# Patient Record
Sex: FEMALE | Race: WHITE | URBAN - METROPOLITAN AREA
[De-identification: names, ages, dates, MRNs, and addresses within clinical notes are randomized per-mention and may not be internally consistent; named-entity substitution may affect disease eponyms.]

---

## 2018-12-27 ENCOUNTER — OFFICE VISIT (OUTPATIENT)
Dept: PRIMARY CARE CLINIC | Age: 33
End: 2018-12-27
Payer: COMMERCIAL

## 2018-12-27 VITALS
HEART RATE: 78 BPM | WEIGHT: 200 LBS | HEIGHT: 66 IN | TEMPERATURE: 98.5 F | DIASTOLIC BLOOD PRESSURE: 84 MMHG | SYSTOLIC BLOOD PRESSURE: 120 MMHG | OXYGEN SATURATION: 98 % | RESPIRATION RATE: 16 BRPM | BODY MASS INDEX: 32.14 KG/M2

## 2018-12-27 DIAGNOSIS — R10.9 ABDOMINAL PAIN, UNSPECIFIED ABDOMINAL LOCATION: Primary | ICD-10-CM

## 2018-12-27 DIAGNOSIS — R10.9 ABDOMINAL PAIN, UNSPECIFIED ABDOMINAL LOCATION: ICD-10-CM

## 2018-12-27 DIAGNOSIS — M25.531 BILATERAL WRIST PAIN: ICD-10-CM

## 2018-12-27 DIAGNOSIS — M25.532 BILATERAL WRIST PAIN: ICD-10-CM

## 2018-12-27 DIAGNOSIS — N92.6 MISSED PERIODS: ICD-10-CM

## 2018-12-27 LAB
ALBUMIN SERPL-MCNC: 4.4 G/DL (ref 3.9–4.9)
ALP BLD-CCNC: 81 U/L (ref 40–130)
ALT SERPL-CCNC: 17 U/L (ref 0–33)
ANION GAP SERPL CALCULATED.3IONS-SCNC: 13 MEQ/L (ref 7–13)
AST SERPL-CCNC: 19 U/L (ref 0–35)
BASOPHILS ABSOLUTE: 0 K/UL (ref 0–0.2)
BASOPHILS RELATIVE PERCENT: 0.6 %
BILIRUB SERPL-MCNC: <0.2 MG/DL (ref 0–1.2)
BILIRUBIN, POC: NORMAL
BLOOD URINE, POC: NORMAL
BUN BLDV-MCNC: 15 MG/DL (ref 6–20)
CALCIUM SERPL-MCNC: 9.2 MG/DL (ref 8.6–10.2)
CHLORIDE BLD-SCNC: 100 MEQ/L (ref 98–107)
CLARITY, POC: CLEAR
CO2: 26 MEQ/L (ref 22–29)
COLOR, POC: NORMAL
CONTROL: NORMAL
CREAT SERPL-MCNC: 0.57 MG/DL (ref 0.5–0.9)
EOSINOPHILS ABSOLUTE: 0.2 K/UL (ref 0–0.7)
EOSINOPHILS RELATIVE PERCENT: 2.2 %
GFR AFRICAN AMERICAN: >60
GFR NON-AFRICAN AMERICAN: >60
GLOBULIN: 4 G/DL (ref 2.3–3.5)
GLUCOSE BLD-MCNC: 78 MG/DL (ref 74–109)
GLUCOSE URINE, POC: NORMAL
HCT VFR BLD CALC: 37.6 % (ref 37–47)
HEMOGLOBIN: 12.9 G/DL (ref 12–16)
KETONES, POC: NORMAL
LEUKOCYTE EST, POC: NORMAL
LYMPHOCYTES ABSOLUTE: 2.5 K/UL (ref 1–4.8)
LYMPHOCYTES RELATIVE PERCENT: 31.4 %
MCH RBC QN AUTO: 30.9 PG (ref 27–31.3)
MCHC RBC AUTO-ENTMCNC: 34.2 % (ref 33–37)
MCV RBC AUTO: 90.3 FL (ref 82–100)
MONOCYTES ABSOLUTE: 0.6 K/UL (ref 0.2–0.8)
MONOCYTES RELATIVE PERCENT: 7 %
NEUTROPHILS ABSOLUTE: 4.6 K/UL (ref 1.4–6.5)
NEUTROPHILS RELATIVE PERCENT: 58.8 %
NITRITE, POC: NORMAL
PDW BLD-RTO: 13 % (ref 11.5–14.5)
PH, POC: 6
PLATELET # BLD: 327 K/UL (ref 130–400)
POTASSIUM SERPL-SCNC: 4.2 MEQ/L (ref 3.5–5.1)
PREGNANCY TEST URINE, POC: NEGATIVE
PROTEIN, POC: NORMAL
RBC # BLD: 4.16 M/UL (ref 4.2–5.4)
SODIUM BLD-SCNC: 139 MEQ/L (ref 132–144)
SPECIFIC GRAVITY, POC: 1.01
T4 FREE: 0.86 NG/DL (ref 0.93–1.7)
TOTAL PROTEIN: 8.4 G/DL (ref 6.4–8.1)
TSH SERPL DL<=0.05 MIU/L-ACNC: 9.88 UIU/ML (ref 0.27–4.2)
UROBILINOGEN, POC: 3.5
WBC # BLD: 7.9 K/UL (ref 4.8–10.8)

## 2018-12-27 PROCEDURE — 81025 URINE PREGNANCY TEST: CPT | Performed by: FAMILY MEDICINE

## 2018-12-27 PROCEDURE — 99203 OFFICE O/P NEW LOW 30 MIN: CPT | Performed by: FAMILY MEDICINE

## 2018-12-27 PROCEDURE — 81002 URINALYSIS NONAUTO W/O SCOPE: CPT | Performed by: FAMILY MEDICINE

## 2018-12-27 RX ORDER — SERTRALINE HYDROCHLORIDE 100 MG/1
TABLET, FILM COATED ORAL
COMMUNITY
Start: 2018-10-17

## 2018-12-27 RX ORDER — ISOTRETINOIN 20 MG/1
CAPSULE ORAL
COMMUNITY
Start: 2002-12-15 | End: 2018-12-27

## 2018-12-27 ASSESSMENT — PATIENT HEALTH QUESTIONNAIRE - PHQ9
SUM OF ALL RESPONSES TO PHQ9 QUESTIONS 1 & 2: 2
SUM OF ALL RESPONSES TO PHQ QUESTIONS 1-9: 2
1. LITTLE INTEREST OR PLEASURE IN DOING THINGS: 1
2. FEELING DOWN, DEPRESSED OR HOPELESS: 1
SUM OF ALL RESPONSES TO PHQ QUESTIONS 1-9: 2

## 2018-12-27 ASSESSMENT — ENCOUNTER SYMPTOMS
DIARRHEA: 0
ABDOMINAL PAIN: 0
EYE REDNESS: 0
WHEEZING: 0
CONSTIPATION: 0
SHORTNESS OF BREATH: 0
GASTROINTESTINAL NEGATIVE: 1
BACK PAIN: 0
RESPIRATORY NEGATIVE: 1
EYES NEGATIVE: 1
EYE ITCHING: 0
PHOTOPHOBIA: 0

## 2019-01-03 DIAGNOSIS — E03.9 ACQUIRED HYPOTHYROIDISM: Primary | ICD-10-CM

## 2019-01-03 RX ORDER — LEVOTHYROXINE SODIUM 0.05 MG/1
50 TABLET ORAL
Qty: 30 TABLET | Refills: 2 | Status: SHIPPED | OUTPATIENT
Start: 2019-01-03 | End: 2019-01-30

## 2019-01-31 DIAGNOSIS — E03.9 ACQUIRED HYPOTHYROIDISM: ICD-10-CM

## 2019-01-31 LAB — TSH SERPL DL<=0.05 MIU/L-ACNC: 4.22 UIU/ML (ref 0.27–4.2)

## 2019-01-31 RX ORDER — LEVOTHYROXINE SODIUM 0.05 MG/1
50 TABLET ORAL
Qty: 90 TABLET | Refills: 1 | Status: SHIPPED | OUTPATIENT
Start: 2019-01-31 | End: 2019-02-10

## 2019-02-05 ENCOUNTER — TELEPHONE (OUTPATIENT)
Dept: PRIMARY CARE CLINIC | Age: 34
End: 2019-02-05

## 2019-02-10 DIAGNOSIS — E03.9 ACQUIRED HYPOTHYROIDISM: Primary | ICD-10-CM

## 2019-02-10 RX ORDER — LEVOTHYROXINE SODIUM 0.07 MG/1
75 TABLET ORAL DAILY
Qty: 30 TABLET | Refills: 5 | Status: SHIPPED | OUTPATIENT
Start: 2019-02-10

## 2019-05-29 ENCOUNTER — TELEPHONE (OUTPATIENT)
Dept: ENDOCRINOLOGY | Age: 34
End: 2019-05-29

## 2023-03-10 DIAGNOSIS — Z00.00 HEALTHCARE MAINTENANCE: ICD-10-CM

## 2023-03-10 PROBLEM — J06.9 UPPER RESPIRATORY INFECTION OF MULTIPLE SITES: Status: ACTIVE | Noted: 2023-03-10

## 2023-03-10 PROBLEM — F32.A ANXIETY AND DEPRESSION: Status: ACTIVE | Noted: 2023-03-10

## 2023-03-10 PROBLEM — F32.0 DEPRESSION, MAJOR, SINGLE EPISODE, MILD (CMS-HCC): Status: ACTIVE | Noted: 2023-03-10

## 2023-03-10 PROBLEM — F41.9 ANXIETY AND DEPRESSION: Status: ACTIVE | Noted: 2023-03-10

## 2023-03-10 PROBLEM — E78.5 HYPERLIPIDEMIA: Status: ACTIVE | Noted: 2023-03-10

## 2023-03-10 PROBLEM — R53.83 FATIGUE: Status: ACTIVE | Noted: 2023-03-10

## 2023-03-10 PROBLEM — E55.9 VITAMIN D DEFICIENCY: Status: ACTIVE | Noted: 2023-03-10

## 2023-03-10 PROBLEM — E03.9 HYPOTHYROIDISM: Status: ACTIVE | Noted: 2023-03-10

## 2023-03-10 RX ORDER — OXYCODONE AND ACETAMINOPHEN 5; 325 MG/1; MG/1
TABLET ORAL EVERY 4 HOURS PRN
COMMUNITY
Start: 2018-03-29 | End: 2023-11-01 | Stop reason: ALTCHOICE

## 2023-03-10 RX ORDER — SERTRALINE HYDROCHLORIDE 100 MG/1
1 TABLET, FILM COATED ORAL DAILY
COMMUNITY
Start: 2017-08-10 | End: 2023-04-05

## 2023-03-10 RX ORDER — SERTRALINE HYDROCHLORIDE 50 MG/1
1 TABLET, FILM COATED ORAL DAILY
COMMUNITY
End: 2024-05-09 | Stop reason: DRUGHIGH

## 2023-03-10 RX ORDER — IBUPROFEN 800 MG/1
TABLET ORAL EVERY 6 HOURS
COMMUNITY
Start: 2018-03-29 | End: 2023-11-01 | Stop reason: ALTCHOICE

## 2023-03-10 RX ORDER — OMEGA-3-ACID ETHYL ESTERS 1 G/1
1 CAPSULE, LIQUID FILLED ORAL DAILY
COMMUNITY
Start: 2020-03-02 | End: 2023-03-10 | Stop reason: SDUPTHER

## 2023-03-10 RX ORDER — LEVOTHYROXINE SODIUM 88 UG/1
1 TABLET ORAL DAILY
COMMUNITY
Start: 2020-10-12 | End: 2023-04-04

## 2023-03-13 RX ORDER — OMEGA-3-ACID ETHYL ESTERS 1 G/1
1 CAPSULE, LIQUID FILLED ORAL DAILY
Qty: 30 CAPSULE | Refills: 0 | Status: SHIPPED | OUTPATIENT
Start: 2023-03-13 | End: 2023-03-28

## 2023-03-28 DIAGNOSIS — Z00.00 HEALTHCARE MAINTENANCE: ICD-10-CM

## 2023-03-28 RX ORDER — OMEGA-3-ACID ETHYL ESTERS 1 G/1
1 CAPSULE, LIQUID FILLED ORAL DAILY
Qty: 90 CAPSULE | Refills: 1 | Status: SHIPPED | OUTPATIENT
Start: 2023-03-28 | End: 2023-10-06

## 2023-04-02 DIAGNOSIS — E03.9 HYPOTHYROIDISM, UNSPECIFIED: ICD-10-CM

## 2023-04-04 RX ORDER — LEVOTHYROXINE SODIUM 88 UG/1
TABLET ORAL
Qty: 30 TABLET | Refills: 0 | Status: SHIPPED | OUTPATIENT
Start: 2023-04-04 | End: 2023-05-01

## 2023-04-05 DIAGNOSIS — F41.9 ANXIETY DISORDER, UNSPECIFIED: ICD-10-CM

## 2023-04-05 DIAGNOSIS — F32.A DEPRESSION, UNSPECIFIED: ICD-10-CM

## 2023-04-05 RX ORDER — SERTRALINE HYDROCHLORIDE 100 MG/1
TABLET, FILM COATED ORAL
Qty: 90 TABLET | Refills: 1 | Status: SHIPPED | OUTPATIENT
Start: 2023-04-05 | End: 2023-10-05

## 2023-04-29 DIAGNOSIS — E03.9 HYPOTHYROIDISM, UNSPECIFIED TYPE: ICD-10-CM

## 2023-04-29 DIAGNOSIS — E03.9 HYPOTHYROIDISM, UNSPECIFIED: Primary | ICD-10-CM

## 2023-05-01 RX ORDER — LEVOTHYROXINE SODIUM 88 UG/1
TABLET ORAL
Qty: 30 TABLET | Refills: 0 | Status: SHIPPED | OUTPATIENT
Start: 2023-05-01 | End: 2023-06-05

## 2023-06-02 DIAGNOSIS — E03.9 HYPOTHYROIDISM, UNSPECIFIED: ICD-10-CM

## 2023-06-05 RX ORDER — LEVOTHYROXINE SODIUM 88 UG/1
TABLET ORAL
Qty: 30 TABLET | Refills: 0 | Status: SHIPPED | OUTPATIENT
Start: 2023-06-05 | End: 2023-06-28

## 2023-06-28 DIAGNOSIS — E03.9 HYPOTHYROIDISM, UNSPECIFIED: Primary | ICD-10-CM

## 2023-06-28 RX ORDER — LEVOTHYROXINE SODIUM 88 UG/1
TABLET ORAL
Qty: 30 TABLET | Refills: 0 | Status: SHIPPED | OUTPATIENT
Start: 2023-06-28 | End: 2023-08-03

## 2023-08-02 DIAGNOSIS — E03.9 HYPOTHYROIDISM, UNSPECIFIED: ICD-10-CM

## 2023-08-03 RX ORDER — LEVOTHYROXINE SODIUM 88 UG/1
TABLET ORAL
Qty: 30 TABLET | Refills: 0 | Status: SHIPPED | OUTPATIENT
Start: 2023-08-03 | End: 2023-08-18

## 2023-08-17 DIAGNOSIS — E03.9 HYPOTHYROIDISM, UNSPECIFIED: Primary | ICD-10-CM

## 2023-08-18 RX ORDER — LEVOTHYROXINE SODIUM 88 UG/1
TABLET ORAL
Qty: 90 TABLET | Refills: 1 | Status: SHIPPED | OUTPATIENT
Start: 2023-08-18 | End: 2024-03-19 | Stop reason: SDUPTHER

## 2023-08-24 ENCOUNTER — APPOINTMENT (OUTPATIENT)
Dept: PRIMARY CARE | Facility: CLINIC | Age: 38
End: 2023-08-24
Payer: COMMERCIAL

## 2023-08-30 ENCOUNTER — APPOINTMENT (OUTPATIENT)
Dept: PRIMARY CARE | Facility: CLINIC | Age: 38
End: 2023-08-30
Payer: COMMERCIAL

## 2023-09-20 ENCOUNTER — APPOINTMENT (OUTPATIENT)
Dept: PRIMARY CARE | Facility: CLINIC | Age: 38
End: 2023-09-20
Payer: COMMERCIAL

## 2023-09-27 PROBLEM — E66.812 CLASS 2 OBESITY: Status: ACTIVE | Noted: 2023-09-27

## 2023-09-27 PROBLEM — E66.9 CLASS 2 OBESITY: Status: ACTIVE | Noted: 2023-09-27

## 2023-09-28 ENCOUNTER — APPOINTMENT (OUTPATIENT)
Dept: PRIMARY CARE | Facility: CLINIC | Age: 38
End: 2023-09-28
Payer: COMMERCIAL

## 2023-10-05 DIAGNOSIS — F32.A DEPRESSION, UNSPECIFIED: ICD-10-CM

## 2023-10-05 DIAGNOSIS — F41.9 ANXIETY DISORDER, UNSPECIFIED: Primary | ICD-10-CM

## 2023-10-05 RX ORDER — SERTRALINE HYDROCHLORIDE 100 MG/1
TABLET, FILM COATED ORAL
Qty: 30 TABLET | Refills: 0 | Status: SHIPPED | OUTPATIENT
Start: 2023-10-05 | End: 2023-10-31 | Stop reason: SDUPTHER

## 2023-10-06 DIAGNOSIS — Z00.00 HEALTHCARE MAINTENANCE: Primary | ICD-10-CM

## 2023-10-06 RX ORDER — OMEGA-3-ACID ETHYL ESTERS 1 G/1
1 CAPSULE, LIQUID FILLED ORAL DAILY
Qty: 30 CAPSULE | Refills: 0 | Status: SHIPPED | OUTPATIENT
Start: 2023-10-06 | End: 2023-10-31 | Stop reason: SDUPTHER

## 2023-10-11 DIAGNOSIS — E55.9 VITAMIN D DEFICIENCY: ICD-10-CM

## 2023-10-11 DIAGNOSIS — Z00.00 HEALTH CARE MAINTENANCE: Primary | ICD-10-CM

## 2023-10-11 DIAGNOSIS — E78.49 OTHER HYPERLIPIDEMIA: ICD-10-CM

## 2023-10-11 DIAGNOSIS — E03.8 OTHER SPECIFIED HYPOTHYROIDISM: ICD-10-CM

## 2023-10-31 DIAGNOSIS — Z00.00 HEALTHCARE MAINTENANCE: ICD-10-CM

## 2023-10-31 DIAGNOSIS — F41.9 ANXIETY DISORDER, UNSPECIFIED: ICD-10-CM

## 2023-10-31 DIAGNOSIS — F32.A DEPRESSION, UNSPECIFIED: ICD-10-CM

## 2023-10-31 RX ORDER — OMEGA-3-ACID ETHYL ESTERS 1 G/1
1 CAPSULE, LIQUID FILLED ORAL DAILY
Qty: 90 CAPSULE | Refills: 1 | Status: SHIPPED | OUTPATIENT
Start: 2023-10-31 | End: 2024-05-06

## 2023-10-31 RX ORDER — SERTRALINE HYDROCHLORIDE 100 MG/1
TABLET, FILM COATED ORAL
Qty: 90 TABLET | Refills: 1 | Status: SHIPPED | OUTPATIENT
Start: 2023-10-31 | End: 2023-11-01 | Stop reason: ALTCHOICE

## 2023-11-01 ENCOUNTER — OFFICE VISIT (OUTPATIENT)
Dept: PRIMARY CARE | Facility: CLINIC | Age: 38
End: 2023-11-01
Payer: COMMERCIAL

## 2023-11-01 ENCOUNTER — ANCILLARY PROCEDURE (OUTPATIENT)
Dept: RADIOLOGY | Facility: CLINIC | Age: 38
End: 2023-11-01
Payer: COMMERCIAL

## 2023-11-01 VITALS
DIASTOLIC BLOOD PRESSURE: 70 MMHG | HEART RATE: 96 BPM | RESPIRATION RATE: 18 BRPM | BODY MASS INDEX: 35.87 KG/M2 | WEIGHT: 223.2 LBS | HEIGHT: 66 IN | TEMPERATURE: 97.3 F | SYSTOLIC BLOOD PRESSURE: 130 MMHG | OXYGEN SATURATION: 97 %

## 2023-11-01 DIAGNOSIS — R05.2 SUBACUTE COUGH: ICD-10-CM

## 2023-11-01 DIAGNOSIS — J32.1 SINUSITIS CHRONIC, FRONTAL: ICD-10-CM

## 2023-11-01 DIAGNOSIS — R41.840 CONCENTRATION DEFICIT: Primary | ICD-10-CM

## 2023-11-01 PROBLEM — R05.9 COUGH: Status: ACTIVE | Noted: 2023-11-01

## 2023-11-01 PROBLEM — Z86.59 HISTORY OF DEPRESSION: Status: ACTIVE | Noted: 2023-11-01

## 2023-11-01 PROBLEM — M25.519 SHOULDER PAIN: Status: ACTIVE | Noted: 2023-11-01

## 2023-11-01 PROBLEM — Z98.891 HISTORY OF CESAREAN SECTION: Status: ACTIVE | Noted: 2023-11-01

## 2023-11-01 PROCEDURE — 99213 OFFICE O/P EST LOW 20 MIN: CPT | Performed by: INTERNAL MEDICINE

## 2023-11-01 PROCEDURE — 71046 X-RAY EXAM CHEST 2 VIEWS: CPT

## 2023-11-01 PROCEDURE — 71046 X-RAY EXAM CHEST 2 VIEWS: CPT | Performed by: RADIOLOGY

## 2023-11-01 RX ORDER — BENZONATATE 100 MG/1
100 CAPSULE ORAL 3 TIMES DAILY PRN
Qty: 42 CAPSULE | Refills: 0 | Status: SHIPPED | OUTPATIENT
Start: 2023-11-01 | End: 2023-12-01

## 2023-11-01 RX ORDER — GUAIFENESIN 600 MG/1
1200 TABLET, EXTENDED RELEASE ORAL 2 TIMES DAILY
Qty: 60 TABLET | Refills: 1 | Status: SHIPPED | OUTPATIENT
Start: 2023-11-01 | End: 2024-01-10 | Stop reason: ALTCHOICE

## 2023-11-01 RX ORDER — AZITHROMYCIN 250 MG/1
TABLET, FILM COATED ORAL
Qty: 6 TABLET | Refills: 0 | Status: SHIPPED | OUTPATIENT
Start: 2023-11-01 | End: 2024-01-10 | Stop reason: ALTCHOICE

## 2023-11-01 RX ORDER — FLUTICASONE PROPIONATE 110 UG/1
1 AEROSOL, METERED RESPIRATORY (INHALATION)
Qty: 12 G | Refills: 5 | Status: SHIPPED | OUTPATIENT
Start: 2023-11-01 | End: 2023-11-02 | Stop reason: ENTERED-IN-ERROR

## 2023-11-01 ASSESSMENT — PATIENT HEALTH QUESTIONNAIRE - PHQ9
SUM OF ALL RESPONSES TO PHQ9 QUESTIONS 1 AND 2: 0
1. LITTLE INTEREST OR PLEASURE IN DOING THINGS: NOT AT ALL
2. FEELING DOWN, DEPRESSED OR HOPELESS: NOT AT ALL

## 2023-11-01 ASSESSMENT — ENCOUNTER SYMPTOMS
CHEST TIGHTNESS: 0
CONFUSION: 0
PALPITATIONS: 0
FEVER: 0
WEAKNESS: 0
DIARRHEA: 0
SORE THROAT: 0
CONSTIPATION: 0
CHILLS: 1
UNEXPECTED WEIGHT CHANGE: 0
SHORTNESS OF BREATH: 1
NAUSEA: 0
ARTHRALGIAS: 0
COUGH: 1
RHINORRHEA: 1
ADENOPATHY: 0
JOINT SWELLING: 0
DYSURIA: 0
HEADACHES: 1
SLEEP DISTURBANCE: 0
FATIGUE: 1
ABDOMINAL PAIN: 0
VOMITING: 0
DIZZINESS: 0
WHEEZING: 1

## 2023-11-01 NOTE — PROGRESS NOTES
Subjective   Luz Rojo is a 38 y.o. female who presents for URI (URI /Low energy, fatigue ,low energy).  URI- dry cough,started in sept 2023 ,lasted few weeks, got better little,but still occasional coughing, got worse the last 8-9 days, no chills, but pressure in ears , eyes, feels congestion in the head, no fever , no NVD in the past week, headaches from coughing , SOB when coughing, little wheezing in the chest - took OTC- mucinex , delsyum    Low energy,fatigue  x 1 year ,wondering if  might be symptoms related to ADHD , takes a lot of effort to get things done, never been tested for ADHD, can't concentrate well.    URI   This is a new problem. The current episode started 1 to 4 weeks ago. The problem has been unchanged. There has been no fever. Associated symptoms include congestion, coughing, headaches, rhinorrhea and wheezing. Pertinent negatives include no abdominal pain, diarrhea, dysuria, ear pain, nausea, rash, sore throat or vomiting.     Review of Systems   Constitutional:  Positive for chills and fatigue. Negative for fever and unexpected weight change.        Comment   HENT:  Positive for congestion and rhinorrhea. Negative for ear pain and sore throat.    Respiratory:  Positive for cough, shortness of breath and wheezing. Negative for chest tightness.    Cardiovascular:  Negative for palpitations and leg swelling.   Gastrointestinal:  Negative for abdominal pain, constipation, diarrhea, nausea and vomiting.   Genitourinary:  Negative for dysuria and urgency.   Musculoskeletal:  Negative for arthralgias and joint swelling.   Skin:  Negative for rash.   Neurological:  Positive for headaches. Negative for dizziness and weakness.   Hematological:  Negative for adenopathy.   Psychiatric/Behavioral:  Negative for confusion and sleep disturbance.        Objective   Physical Exam  Constitutional:       Appearance: Normal appearance.      Comments: Lung clear no wheezing, sinus tenderness to palpation  "  HENT:      Head: Normocephalic and atraumatic.   Eyes:      Pupils: Pupils are equal, round, and reactive to light.   Cardiovascular:      Rate and Rhythm: Normal rate and regular rhythm.   Pulmonary:      Effort: Pulmonary effort is normal.      Breath sounds: Normal breath sounds.   Musculoskeletal:         General: Normal range of motion.      Cervical back: Normal range of motion and neck supple.   Skin:     General: Skin is warm.   Neurological:      General: No focal deficit present.      Mental Status: She is alert and oriented to person, place, and time.   Psychiatric:         Mood and Affect: Mood normal.         Behavior: Behavior normal.       /70 (BP Location: Left arm, Patient Position: Sitting)   Pulse 96   Temp 36.3 °C (97.3 °F)   Resp 18   Ht 1.676 m (5' 6\")   Wt 101 kg (223 lb 3.2 oz)   SpO2 97% Comment: RA  BMI 36.03 kg/m²       Assessment/Plan   Problem List Items Addressed This Visit       Cough    Relevant Medications    guaiFENesin (Mucinex) 600 mg 12 hr tablet    Other Relevant Orders    XR chest 2 views    Concentration deficit - Primary     Will refer to [facility] for ADHD evaluation.         Relevant Orders    Referral to [facility]    Referral to Psychiatry    Sinusitis chronic, frontal    Relevant Medications    azithromycin (Zithromax) 250 mg tablet    fluticasone (Flovent) 110 mcg/actuation inhaler    benzonatate (Tessalon) 100 mg capsule    guaiFENesin (Mucinex) 600 mg 12 hr tablet       "

## 2023-11-02 DIAGNOSIS — J06.9: Primary | ICD-10-CM

## 2023-11-02 RX ORDER — FLUTICASONE PROPIONATE 50 MCG
1 SPRAY, SUSPENSION (ML) NASAL DAILY
Qty: 16 G | Refills: 5 | Status: SHIPPED | OUTPATIENT
Start: 2023-11-02 | End: 2023-11-27

## 2023-11-22 ENCOUNTER — APPOINTMENT (OUTPATIENT)
Dept: PRIMARY CARE | Facility: CLINIC | Age: 38
End: 2023-11-22
Payer: COMMERCIAL

## 2023-11-24 DIAGNOSIS — J06.9: ICD-10-CM

## 2023-11-27 RX ORDER — FLUTICASONE PROPIONATE 50 MCG
1 SPRAY, SUSPENSION (ML) NASAL DAILY
Qty: 48 ML | Refills: 2 | Status: SHIPPED | OUTPATIENT
Start: 2023-11-27 | End: 2024-01-10 | Stop reason: ALTCHOICE

## 2023-12-26 ENCOUNTER — APPOINTMENT (OUTPATIENT)
Dept: RADIOLOGY | Facility: CLINIC | Age: 38
End: 2023-12-26
Payer: COMMERCIAL

## 2023-12-26 DIAGNOSIS — Z12.31 SCREENING MAMMOGRAM FOR BREAST CANCER: ICD-10-CM

## 2024-01-05 ENCOUNTER — LAB (OUTPATIENT)
Dept: LAB | Facility: LAB | Age: 39
End: 2024-01-05
Payer: COMMERCIAL

## 2024-01-05 DIAGNOSIS — Z00.00 HEALTH CARE MAINTENANCE: ICD-10-CM

## 2024-01-05 DIAGNOSIS — E55.9 VITAMIN D DEFICIENCY: ICD-10-CM

## 2024-01-05 DIAGNOSIS — E78.49 OTHER HYPERLIPIDEMIA: ICD-10-CM

## 2024-01-05 DIAGNOSIS — E03.8 OTHER SPECIFIED HYPOTHYROIDISM: ICD-10-CM

## 2024-01-05 LAB
25(OH)D3 SERPL-MCNC: 14 NG/ML (ref 30–100)
ALBUMIN SERPL BCP-MCNC: 4.2 G/DL (ref 3.4–5)
ALP SERPL-CCNC: 52 U/L (ref 33–110)
ALT SERPL W P-5'-P-CCNC: 16 U/L (ref 7–45)
ANION GAP SERPL CALC-SCNC: 12 MMOL/L (ref 10–20)
AST SERPL W P-5'-P-CCNC: 18 U/L (ref 9–39)
BASOPHILS # BLD AUTO: 0.03 X10*3/UL (ref 0–0.1)
BASOPHILS NFR BLD AUTO: 0.5 %
BILIRUB SERPL-MCNC: 0.5 MG/DL (ref 0–1.2)
BUN SERPL-MCNC: 17 MG/DL (ref 6–23)
CALCIUM SERPL-MCNC: 9 MG/DL (ref 8.6–10.3)
CHLORIDE SERPL-SCNC: 103 MMOL/L (ref 98–107)
CHOLEST SERPL-MCNC: 196 MG/DL (ref 0–199)
CHOLESTEROL/HDL RATIO: 5.6
CO2 SERPL-SCNC: 26 MMOL/L (ref 21–32)
CREAT SERPL-MCNC: 0.79 MG/DL (ref 0.5–1.05)
EOSINOPHIL # BLD AUTO: 0.08 X10*3/UL (ref 0–0.7)
EOSINOPHIL NFR BLD AUTO: 1.4 %
ERYTHROCYTE [DISTWIDTH] IN BLOOD BY AUTOMATED COUNT: 12.9 % (ref 11.5–14.5)
EST. AVERAGE GLUCOSE BLD GHB EST-MCNC: 103 MG/DL
GFR SERPL CREATININE-BSD FRML MDRD: >90 ML/MIN/1.73M*2
GLUCOSE SERPL-MCNC: 97 MG/DL (ref 74–99)
HBA1C MFR BLD: 5.2 %
HCT VFR BLD AUTO: 38.8 % (ref 36–46)
HCV AB SER QL: NONREACTIVE
HDLC SERPL-MCNC: 35.3 MG/DL
HGB BLD-MCNC: 13 G/DL (ref 12–16)
IMM GRANULOCYTES # BLD AUTO: 0.02 X10*3/UL (ref 0–0.7)
IMM GRANULOCYTES NFR BLD AUTO: 0.4 % (ref 0–0.9)
LDLC SERPL CALC-MCNC: 120 MG/DL
LYMPHOCYTES # BLD AUTO: 2.29 X10*3/UL (ref 1.2–4.8)
LYMPHOCYTES NFR BLD AUTO: 40.2 %
MAGNESIUM SERPL-MCNC: 1.81 MG/DL (ref 1.6–2.4)
MCH RBC QN AUTO: 30.4 PG (ref 26–34)
MCHC RBC AUTO-ENTMCNC: 33.5 G/DL (ref 32–36)
MCV RBC AUTO: 91 FL (ref 80–100)
MONOCYTES # BLD AUTO: 0.4 X10*3/UL (ref 0.1–1)
MONOCYTES NFR BLD AUTO: 7 %
NEUTROPHILS # BLD AUTO: 2.87 X10*3/UL (ref 1.2–7.7)
NEUTROPHILS NFR BLD AUTO: 50.5 %
NON HDL CHOLESTEROL: 161 MG/DL (ref 0–149)
NRBC BLD-RTO: 0 /100 WBCS (ref 0–0)
PLATELET # BLD AUTO: 337 X10*3/UL (ref 150–450)
POTASSIUM SERPL-SCNC: 4.2 MMOL/L (ref 3.5–5.3)
PROT SERPL-MCNC: 7.8 G/DL (ref 6.4–8.2)
RBC # BLD AUTO: 4.28 X10*6/UL (ref 4–5.2)
SODIUM SERPL-SCNC: 137 MMOL/L (ref 136–145)
TRIGL SERPL-MCNC: 205 MG/DL (ref 0–149)
TSH SERPL-ACNC: 2.74 MIU/L (ref 0.44–3.98)
VIT B12 SERPL-MCNC: 281 PG/ML (ref 211–911)
VLDL: 41 MG/DL (ref 0–40)
WBC # BLD AUTO: 5.7 X10*3/UL (ref 4.4–11.3)

## 2024-01-05 PROCEDURE — 83036 HEMOGLOBIN GLYCOSYLATED A1C: CPT

## 2024-01-05 PROCEDURE — 85025 COMPLETE CBC W/AUTO DIFF WBC: CPT

## 2024-01-05 PROCEDURE — 86803 HEPATITIS C AB TEST: CPT

## 2024-01-05 PROCEDURE — 82306 VITAMIN D 25 HYDROXY: CPT

## 2024-01-05 PROCEDURE — 80053 COMPREHEN METABOLIC PANEL: CPT

## 2024-01-05 PROCEDURE — 80061 LIPID PANEL: CPT

## 2024-01-05 PROCEDURE — 82607 VITAMIN B-12: CPT

## 2024-01-05 PROCEDURE — 36415 COLL VENOUS BLD VENIPUNCTURE: CPT

## 2024-01-05 PROCEDURE — 83735 ASSAY OF MAGNESIUM: CPT

## 2024-01-05 PROCEDURE — 84443 ASSAY THYROID STIM HORMONE: CPT

## 2024-01-08 RX ORDER — ACETAMINOPHEN 500 MG
5000 TABLET ORAL DAILY
COMMUNITY

## 2024-01-10 ENCOUNTER — APPOINTMENT (OUTPATIENT)
Dept: RADIOLOGY | Facility: CLINIC | Age: 39
End: 2024-01-10
Payer: COMMERCIAL

## 2024-01-10 ENCOUNTER — OFFICE VISIT (OUTPATIENT)
Dept: PRIMARY CARE | Facility: CLINIC | Age: 39
End: 2024-01-10
Payer: COMMERCIAL

## 2024-01-10 VITALS
DIASTOLIC BLOOD PRESSURE: 64 MMHG | WEIGHT: 230.2 LBS | OXYGEN SATURATION: 97 % | SYSTOLIC BLOOD PRESSURE: 120 MMHG | HEART RATE: 78 BPM | BODY MASS INDEX: 37 KG/M2 | RESPIRATION RATE: 16 BRPM | TEMPERATURE: 97 F | HEIGHT: 66 IN

## 2024-01-10 DIAGNOSIS — E78.49 OTHER HYPERLIPIDEMIA: ICD-10-CM

## 2024-01-10 DIAGNOSIS — F32.0 DEPRESSION, MAJOR, SINGLE EPISODE, MILD (CMS-HCC): ICD-10-CM

## 2024-01-10 DIAGNOSIS — E55.9 VITAMIN D DEFICIENCY: ICD-10-CM

## 2024-01-10 DIAGNOSIS — E03.8 OTHER SPECIFIED HYPOTHYROIDISM: ICD-10-CM

## 2024-01-10 DIAGNOSIS — N94.6 DYSMENORRHEA: ICD-10-CM

## 2024-01-10 DIAGNOSIS — E66.9 CLASS 2 OBESITY: ICD-10-CM

## 2024-01-10 DIAGNOSIS — Z00.00 ANNUAL PHYSICAL EXAM: Primary | ICD-10-CM

## 2024-01-10 DIAGNOSIS — N92.0 MENORRHAGIA WITH REGULAR CYCLE: ICD-10-CM

## 2024-01-10 PROBLEM — J06.9 UPPER RESPIRATORY INFECTION OF MULTIPLE SITES: Status: RESOLVED | Noted: 2023-03-10 | Resolved: 2024-01-10

## 2024-01-10 PROCEDURE — 99396 PREV VISIT EST AGE 40-64: CPT | Performed by: INTERNAL MEDICINE

## 2024-01-10 PROCEDURE — 99214 OFFICE O/P EST MOD 30 MIN: CPT | Performed by: INTERNAL MEDICINE

## 2024-01-10 ASSESSMENT — ENCOUNTER SYMPTOMS
ADENOPATHY: 0
WEAKNESS: 0
WHEEZING: 0
JOINT SWELLING: 0
ABDOMINAL PAIN: 0
DYSURIA: 0
ARTHRALGIAS: 1
COUGH: 0
DIZZINESS: 0
CONSTIPATION: 0
CHEST TIGHTNESS: 0
CONFUSION: 0
UNEXPECTED WEIGHT CHANGE: 0
SLEEP DISTURBANCE: 0
NAUSEA: 0
VOMITING: 0
DIARRHEA: 0
NERVOUS/ANXIOUS: 1
PALPITATIONS: 0
FATIGUE: 1
SORE THROAT: 0
CHILLS: 0
SHORTNESS OF BREATH: 0

## 2024-01-10 ASSESSMENT — PATIENT HEALTH QUESTIONNAIRE - PHQ9
2. FEELING DOWN, DEPRESSED OR HOPELESS: NOT AT ALL
1. LITTLE INTEREST OR PLEASURE IN DOING THINGS: NOT AT ALL
SUM OF ALL RESPONSES TO PHQ9 QUESTIONS 1 AND 2: 0

## 2024-01-10 NOTE — PATIENT INSTRUCTIONS
Was nice seeing you today.  Continue same medication.  Have lab work done before next appointment if labs were ordered today.  Fu in 6 month/prn.  Call/ contact our office with any concerns.

## 2024-01-10 NOTE — PROGRESS NOTES
Subjective   Luz Rojo is a 38 y.o. female who presents for Annual Exam (CPE/Labs - 1.2024).  CPE  Lab - 1.2024  Mammogram order given - 12.10.2023 ,not done yet, had johnny for today but needs to R/S it   Smoking -never  Heavy MP since turned 38 y old, lot of cramping and had to take  ibuprofen 800mg several times during the day  Started to take vit D 3-5000ui  QD -OTC last Friday .  Pap 2022      Review of Systems   Constitutional:  Positive for fatigue. Negative for chills and unexpected weight change.        Sometimes   HENT:  Negative for congestion, ear pain and sore throat.    Respiratory:  Negative for cough, chest tightness, shortness of breath and wheezing.    Cardiovascular:  Negative for palpitations and leg swelling.   Gastrointestinal:  Negative for abdominal pain, constipation, diarrhea, nausea and vomiting.   Genitourinary:  Negative for dysuria and urgency.   Musculoskeletal:  Positive for arthralgias. Negative for joint swelling.   Skin:  Negative for rash.   Neurological:  Negative for dizziness and weakness.   Hematological:  Negative for adenopathy.   Psychiatric/Behavioral:  Negative for confusion and sleep disturbance. The patient is nervous/anxious.    All other systems reviewed and are negative.      Objective   Physical Exam  Constitutional:       Appearance: Normal appearance.   HENT:      Head: Normocephalic and atraumatic.   Eyes:      Conjunctiva/sclera: Conjunctivae normal.   Neck:      Vascular: No carotid bruit.   Cardiovascular:      Rate and Rhythm: Normal rate and regular rhythm.      Heart sounds: No murmur heard.  Pulmonary:      Effort: No respiratory distress.      Breath sounds: No wheezing, rhonchi or rales.   Chest:      Chest wall: No tenderness.   Abdominal:      General: Bowel sounds are normal. There is no distension.      Palpations: Abdomen is soft. There is no mass.      Tenderness: There is no abdominal tenderness.   Musculoskeletal:         General: Normal  "range of motion.      Cervical back: Neck supple.   Lymphadenopathy:      Cervical: No cervical adenopathy.   Skin:     Coloration: Skin is not jaundiced.      Findings: No rash.   Neurological:      General: No focal deficit present.      Mental Status: She is alert and oriented to person, place, and time. Mental status is at baseline.      Motor: No weakness.      Gait: Gait normal.   Psychiatric:         Mood and Affect: Mood normal.         Behavior: Behavior normal.         Judgment: Judgment normal.       /64 (BP Location: Right arm, Patient Position: Sitting)   Pulse 78   Temp 36.1 °C (97 °F)   Resp 16   Ht 1.676 m (5' 6\")   Wt 104 kg (230 lb 3.2 oz)   LMP 12/15/2023 Comment: heavy MP since turned 38 y old  SpO2 97% Comment: RA  BMI 37.16 kg/m²       Assessment/Plan   Problem List Items Addressed This Visit       Depression, major, single episode, mild (CMS/HCC)     Controlled on current medication.         Hyperlipidemia     I have discussed the cardiovascular risk and behavioral modification, nutrition, exercise and elimination of habits contributing to risk. We agreed to a plan how to reduce the risk.  CV risk estimate calculates  On diet , wt loss         Hypothyroidism    Vitamin D deficiency    Class 2 obesity    Menorrhagia with regular cycle    Dysmenorrhea    Annual physical exam - Primary       "

## 2024-01-10 NOTE — ASSESSMENT & PLAN NOTE
I have discussed the cardiovascular risk and behavioral modification, nutrition, exercise and elimination of habits contributing to risk. We agreed to a plan how to reduce the risk.  CV risk estimate calculates  On diet , wt loss

## 2024-01-24 ENCOUNTER — TELEPHONE (OUTPATIENT)
Dept: PRIMARY CARE | Facility: CLINIC | Age: 39
End: 2024-01-24

## 2024-01-24 ENCOUNTER — HOSPITAL ENCOUNTER (OUTPATIENT)
Dept: RADIOLOGY | Facility: CLINIC | Age: 39
Discharge: HOME | End: 2024-01-24
Payer: COMMERCIAL

## 2024-01-24 ENCOUNTER — HOSPITAL ENCOUNTER (OUTPATIENT)
Dept: RADIOLOGY | Facility: CLINIC | Age: 39
End: 2024-01-24
Payer: COMMERCIAL

## 2024-01-24 ENCOUNTER — APPOINTMENT (OUTPATIENT)
Dept: RADIOLOGY | Facility: CLINIC | Age: 39
End: 2024-01-24
Payer: COMMERCIAL

## 2024-01-24 DIAGNOSIS — N63.10 MASS OF RIGHT BREAST, UNSPECIFIED QUADRANT: Primary | ICD-10-CM

## 2024-01-24 DIAGNOSIS — Z12.31 ENCOUNTER FOR SCREENING MAMMOGRAM FOR MALIGNANT NEOPLASM OF BREAST: ICD-10-CM

## 2024-01-24 DIAGNOSIS — Z12.31 SCREENING MAMMOGRAM FOR BREAST CANCER: ICD-10-CM

## 2024-01-24 PROCEDURE — 77063 BREAST TOMOSYNTHESIS BI: CPT | Performed by: RADIOLOGY

## 2024-01-24 PROCEDURE — 77067 SCR MAMMO BI INCL CAD: CPT | Performed by: RADIOLOGY

## 2024-01-24 PROCEDURE — 77067 SCR MAMMO BI INCL CAD: CPT

## 2024-01-24 NOTE — TELEPHONE ENCOUNTER
Pt has mammogram done today and results are back already- she read them but needs to speak to someone asap.

## 2024-01-26 ENCOUNTER — HOSPITAL ENCOUNTER (OUTPATIENT)
Dept: RADIOLOGY | Facility: HOSPITAL | Age: 39
Discharge: HOME | End: 2024-01-26
Payer: COMMERCIAL

## 2024-01-26 ENCOUNTER — PROCEDURE VISIT (OUTPATIENT)
Dept: SURGICAL ONCOLOGY | Facility: HOSPITAL | Age: 39
End: 2024-01-26
Payer: COMMERCIAL

## 2024-01-26 ENCOUNTER — APPOINTMENT (OUTPATIENT)
Dept: RADIOLOGY | Facility: HOSPITAL | Age: 39
End: 2024-01-26
Payer: COMMERCIAL

## 2024-01-26 VITALS
HEART RATE: 110 BPM | HEIGHT: 66 IN | DIASTOLIC BLOOD PRESSURE: 90 MMHG | BODY MASS INDEX: 36.32 KG/M2 | SYSTOLIC BLOOD PRESSURE: 169 MMHG | WEIGHT: 226 LBS

## 2024-01-26 DIAGNOSIS — N63.10 MASS OF RIGHT BREAST, UNSPECIFIED QUADRANT: ICD-10-CM

## 2024-01-26 DIAGNOSIS — R59.0 AXILLARY LYMPHADENOPATHY: ICD-10-CM

## 2024-01-26 DIAGNOSIS — R92.8 ABNORMAL MAMMOGRAM: Primary | ICD-10-CM

## 2024-01-26 DIAGNOSIS — N63.10 UNSPECIFIED LUMP IN THE RIGHT BREAST, UNSPECIFIED QUADRANT: ICD-10-CM

## 2024-01-26 PROCEDURE — 99214 OFFICE O/P EST MOD 30 MIN: CPT | Performed by: NURSE PRACTITIONER

## 2024-01-26 PROCEDURE — 76642 ULTRASOUND BREAST LIMITED: CPT | Mod: RIGHT SIDE | Performed by: RADIOLOGY

## 2024-01-26 PROCEDURE — 76642 ULTRASOUND BREAST LIMITED: CPT | Mod: RT

## 2024-01-26 PROCEDURE — 99204 OFFICE O/P NEW MOD 45 MIN: CPT | Performed by: NURSE PRACTITIONER

## 2024-01-26 PROCEDURE — 76982 USE 1ST TARGET LESION: CPT | Mod: RT

## 2024-01-29 PROBLEM — R92.8 ABNORMAL MAMMOGRAM: Status: ACTIVE | Noted: 2024-01-29

## 2024-01-29 PROBLEM — R59.0 AXILLARY LYMPHADENOPATHY: Status: ACTIVE | Noted: 2024-01-29

## 2024-01-29 NOTE — PROGRESS NOTES
[facility] [place]  Luz Rojo female   1985 38 y.o.  [id number]      Chief Complaint  New patient, biopsy consultation.    History Of Present Illness  Luz Rojo is a very pleasant 38 y.o.  female seen in the breast center for biopsy consultation. She denies breast surgery or biopsy. She has family history of breast cancer in her paternal grandmother, 40's.    BREAST IMAGIN2024 Right diagnostic mammogram with ultrasound, indicates BI-RADS Category 5. Suspicious right breast mass with associated axillary adenopathy. A surgical consultation for an ultrasound-guided biopsy is recommended.    REPRODUCTIVE HISTORY: menarche age 10, , first birth age 30, did not breastfeed, no OCP's, premenopausal,                                  FAMILY CANCER HISTORY:    Paternal Grandmother: Breast cancer, 40's  Maternal Grandfather: Colon cancer, 70's    Review of Systems  Constitutional:  Negative for appetite change, fatigue, fever and unexpected weight change.   HENT:  Negative for ear pain, hearing loss, nosebleeds, sore throat and trouble swallowing.    Eyes:  Negative for discharge, itching and visual disturbance.   Breast: As stated in HPI.  Respiratory:  Negative for cough, chest tightness and shortness of breath.    Cardiovascular:  Negative for chest pain, palpitations and leg swelling.   Gastrointestinal:  Negative for abdominal pain, constipation, diarrhea and nausea.   Endocrine: Negative for cold intolerance and heat intolerance.   Genitourinary:  Negative for dysuria, frequency, hematuria, pelvic pain and vaginal bleeding.   Musculoskeletal:  Negative for arthralgias, back pain, gait problem, joint swelling and myalgias.   Skin:  Negative for color change and rash.   Allergic/Immunologic: Negative for environmental allergies and food allergies.   Neurological:  Negative for dizziness, tremors, speech difficulty, weakness, numbness and headaches.   Hematological:  Does not  bruise/bleed easily.   Psychiatric/Behavioral:  Negative for agitation, dysphoric mood and sleep disturbance. The patient is not nervous/anxious.       Past Medical History  She has a past medical history of Candidiasis, unspecified, Encounter for routine postpartum follow-up (2018), Maternal care for unspecified type scar from previous  delivery (2018), Nonpurulent mastitis associated with the puerperium (2018), Personal history of other diseases of the respiratory system (2019), Personal history of other mental and behavioral disorders, and Streptococcal pharyngitis (2019).    Surgical History  She has a past surgical history that includes  section, low transverse (2018).    Family History  Cancer-related family history includes Breast cancer (age of onset: 40 - 49) in her paternal grandmother.     Social History  She reports that she has never smoked. She uses smokeless tobacco. She reports current alcohol use. She reports that she does not currently use drugs.    Allergies  Patient has no known allergies.    Medications  Current Outpatient Medications   Medication Instructions   • cholecalciferol (VITAMIN D3) 5,000 Units, oral, Daily   • levothyroxine (Synthroid, Levoxyl) 88 mcg tablet TAKE 1 TABLET BY MOUTH EVERY DAY   • omega-3 acid ethyl esters (LOVAZA) 1 g, oral, Daily   • sertraline (Zoloft) 50 mg tablet 1 tablet, oral, Daily     Last Recorded Vitals  Vitals:    24 1532   BP: 169/90   Pulse: 110       Physical Exam  Chest:       Patient is alert and oriented x3 and anxious. Her gait is steady and hand grasps are equal. Sclera is clear. The breasts are nearly symmetrical. Right breast 10:30, 10 cm from the nipple is a 2.0 x 1.5 cm mass, round, and mobile. The left breast tissue is soft without palpable abnormalities, discrete nodules or masses. The skin and nipples appear normal. There is no cervical, supraclavicular or axillary lymphadenopathy. Heart  rate and rhythm normal, S1 and S2 appreciated. The lungs are clear to auscultation bilaterally. Abdomen is soft and non-tender.      Relevant Results and Imaging  Study Result    Narrative & Impression   Interpreted By:  Mayra Ledesma,   STUDY:  BI US BREAST LIMITED RIGHT;  1/26/2024 3:24 pm      ACCESSION NUMBER(S):  [id number]      ORDERING CLINICIAN:  AIDEN RIVERA      INDICATION:  The patient was recalled from recent screening mammogram 01/24/2024  for a spiculated right breast mass.      COMPARISON:  01/24/2024      FINDINGS:  ULTRASOUND:  Targeted sonographic evaluation of the right breast and  axilla was performed using elastography. It demonstrates an irregular  hypoechoic mass with angular margins in the 10:30 position 10 cm from  the nipple. It measures 2.0 x 1.3 x 1.5 cm. The mass is minimally  vascular and partially stiff on elastography. It correlates with the  mammographic finding      The two lowest axillary lymph nodes are abnormal. Lymph node #1 has  markedly thickened cortices measuring at least 6 mm in thickness.  Lymph node #2 has its fatty hilum completely effaced.      IMPRESSION:  Suspicious right breast mass with associated axillary adenopathy. A  surgical consultation for an ultrasound-guided biopsy is recommended.  The patient is being seen today by Anaid Dickerson CNP. The biopsies  will be scheduled. A pre-procedure form has been completed.      BI-RADS CATEGORY:      BI-RADS CATEGORY:  5 Highly Suggestive of Malignancy.  Recommendation:  Biopsy.  Recommended Date:  Immediate.  Laterality:  Right.      For any future breast imaging appointments, please call [phone]).      Method of Detection: Category Sdbt - 3D Screening      MACRO:  Critical Finding:  See findings. Notification was initiated on  1/26/2024 at 3:34 pm by Dr. Mayra Ledesma.  (**-YCF-**) Instructions:  See Impression for specific recommendations.      Signed by: Mayra Ledesma 1/26/2024 3:35 PM     Time was spent  viewing digital images. I explained the results in depth, along with suggested explanation for follow up recommendations based on the testing results. BI-RADS Category 5    Visit Diagnosis  1. Abnormal mammogram  BI US guided breast localization and biopsy right    BI US biopsy of lymph node    BI breast biopsy clip imaging      2. Axillary lymphadenopathy          Assessment/Plan  Abnormal mammogram, right breast mass, right axillary lymphadenopathy, no breast surgery or biopsy, family history of breast cancer, scattered fibroglandular tissue    Plan:  Right breast and axillary ultrasound guided core biopsy.    Patient Discussion/Summary  Proceed to biopsy. A breast radiology physician will perform the biopsy. Results are usually available in about 7 business days. I will call patient with results and instruct on next steps and plan.     IMPORTANT INFORMATION REGARDING YOUR RESULTS    If you receive medical information from My [facility] Personal Health Record (online chart) your results will be released into your chart. This means you may view or see results of your biopsy or procedure before I contact you directly. If this occurs, please call the office and we will discuss your results over the phone.    You can see your health information, review clinical summaries from office visits & test results online when you follow your health with MY  Chart, a personal health record. To sign up go to [email or web address]. If you need assistance with signing up or trouble getting into your account call [employer] Patient Line 24/7 at [phone].    My office phone number is [phone]  if you need to get in touch with me or have additional questions or concerns. Thank you for choosing [facility] and trusting me as your healthcare provider. I look forward to seeing you again at your next office visit. I am honored to be a provider on your health care team and I remain dedicated to helping you achieve your  health goals.       Anaid Dickerson, SIDDHARTHA-CNP

## 2024-01-30 ENCOUNTER — HOSPITAL ENCOUNTER (OUTPATIENT)
Dept: RADIOLOGY | Facility: HOSPITAL | Age: 39
Discharge: HOME | End: 2024-01-30
Payer: COMMERCIAL

## 2024-01-30 DIAGNOSIS — R92.8 ABNORMAL MAMMOGRAM: ICD-10-CM

## 2024-01-30 PROCEDURE — 77065 DX MAMMO INCL CAD UNI: CPT | Mod: RIGHT SIDE | Performed by: RADIOLOGY

## 2024-01-30 PROCEDURE — 88342 IMHCHEM/IMCYTCHM 1ST ANTB: CPT | Performed by: STUDENT IN AN ORGANIZED HEALTH CARE EDUCATION/TRAINING PROGRAM

## 2024-01-30 PROCEDURE — 88360 TUMOR IMMUNOHISTOCHEM/MANUAL: CPT | Performed by: STUDENT IN AN ORGANIZED HEALTH CARE EDUCATION/TRAINING PROGRAM

## 2024-01-30 PROCEDURE — 77065 DX MAMMO INCL CAD UNI: CPT | Mod: RT

## 2024-01-30 PROCEDURE — 88341 IMHCHEM/IMCYTCHM EA ADD ANTB: CPT | Performed by: STUDENT IN AN ORGANIZED HEALTH CARE EDUCATION/TRAINING PROGRAM

## 2024-01-30 PROCEDURE — 38505 NEEDLE BIOPSY LYMPH NODES: CPT | Mod: RIGHT SIDE | Performed by: RADIOLOGY

## 2024-01-30 PROCEDURE — 19083 BX BREAST 1ST LESION US IMAG: CPT | Mod: RIGHT SIDE | Performed by: RADIOLOGY

## 2024-01-30 PROCEDURE — 19084 BX BREAST ADD LESION US IMAG: CPT | Mod: RT

## 2024-01-30 PROCEDURE — 88305 TISSUE EXAM BY PATHOLOGIST: CPT | Performed by: STUDENT IN AN ORGANIZED HEALTH CARE EDUCATION/TRAINING PROGRAM

## 2024-01-30 PROCEDURE — 88342 IMHCHEM/IMCYTCHM 1ST ANTB: CPT | Mod: TC,STJLAB | Performed by: NURSE PRACTITIONER

## 2024-01-30 PROCEDURE — 19083 BX BREAST 1ST LESION US IMAG: CPT | Mod: RT

## 2024-01-30 PROCEDURE — 10035 PLMT SFT TISS LOCLZJ DEV 1ST: CPT | Mod: RIGHT SIDE | Performed by: RADIOLOGY

## 2024-01-30 PROCEDURE — 2720000007 HC OR 272 NO HCPCS

## 2024-02-01 DIAGNOSIS — F41.9 ANXIETY: Primary | ICD-10-CM

## 2024-02-01 RX ORDER — HYDROXYZINE HYDROCHLORIDE 10 MG/1
10 TABLET, FILM COATED ORAL EVERY 8 HOURS PRN
Qty: 40 TABLET | Refills: 1 | Status: SHIPPED | OUTPATIENT
Start: 2024-02-01 | End: 2024-04-11 | Stop reason: SDUPTHER

## 2024-02-05 ENCOUNTER — TELEPHONE (OUTPATIENT)
Dept: SURGICAL ONCOLOGY | Facility: HOSPITAL | Age: 39
End: 2024-02-05
Payer: COMMERCIAL

## 2024-02-05 LAB
LAB AP ASR DISCLAIMER: NORMAL
LABORATORY COMMENT REPORT: NORMAL
PATH REPORT.ADDENDUM SPEC: NORMAL
PATH REPORT.COMMENTS IMP SPEC: NORMAL
PATH REPORT.FINAL DX SPEC: NORMAL
PATH REPORT.GROSS SPEC: NORMAL
PATH REPORT.RELEVANT HX SPEC: NORMAL
PATH REPORT.TOTAL CANCER: NORMAL

## 2024-02-05 NOTE — TELEPHONE ENCOUNTER
Result Communication    Spoke with Luz Rojo regarding breast biopsy results showing cancer. Office to call and schedule surgical consult.       Resulted Orders   Surgical Pathology Exam   Result Value Ref Range    Case Report       Surgical Pathology                                Case: [id number]                                  Authorizing Provider:  JOSE G Raines    Collected:           01/30/2024 1407              Ordering Location:     [facility] [place] Received:            01/30/2024 5655              Pathologist:           Lila Guevara MD, MS                                                            Specimens:   A) - BREAST CORE BIOPSY RIGHT, Right Breast Mass 10:30 10 CM From Nipple                            B) - AXILLARY LYMPH NODE BIOPSY RIGHT - BREAST, Right Axillary Lymph Node #1               FINAL DIAGNOSIS       A. Right breast, mass, 10:30, 10 cm from nipple, ultrasound-guided core needle biopsy:  - Invasive ductal carcinoma, preliminary grade 2. See comment.    B.  Right axillary lymph node, #1, ultrasound-guided core needle biopsy:  - Metastatic breast carcinoma. See comment.              By the signature on this report, the individual or group listed as making the Final Interpretation/Diagnosis certifies that they have reviewed this case.       Comment       A. The invasive carcinoma consists of tumor cells with cytoplasmic clearing, arranged in sheets.  By immunohistochemical staining, the tumor cells are positive for GATA3 and CK7, show weak focal positivity for SATB2, and negative for CK20 and PAX8. The histologic and immunohistochemical profile, supports the above rendered diagnosis.  In this limited sample, the tumor measures up to 6 mm in greatest dimension.  ER, OH, and HER2 are ordered and will be reported in an addendum.    B. The metastatic carcinoma is histologically similar to that seen in part A, and supports the diagnosis rendered.    This case (Parts A and  B) and the associated immunohistochemical stains, were reviewed at the breast intradepartmental consensus conference on 2024, with concordance.      Addendum       Surgical/Block Number: [id number]  Specimen Site: Right breast  Specimen Type: Ultrasound-guided core needle biopsy    Estrogen Receptor (clone SP1):  Positive        Percentage with nuclear stainin-70%        Intensity of staining: Moderate to strong    Progesterone Receptor (clone SP2):              Positive  Percentage with nuclear stainin-90%        Intensity of staining: Strong    HER2 (clone 4B5):    Negative (1+)          Comment:  Internal positive controls were identified in this specimen.  ASCO/CAP guidelines for ischemic times are met for this sample.    For ER: Ranges for interpretation: Invasive carcinoma cells exhibiting greater than or equal to 10% nuclear staining are considered POSITIVE. Invasive carcinoma cells exhibiting less than 10%, but greater than or equal to 1% are considered LOW POSITIVE. Invasive carcinoma cells exhibiting less than 1% staining are considered NEGATIVE. (Reference: Arch Pathol Lab Med. doi:10.5858/arpa. [id number]) The stated steroid receptor activity for ER was derived from rabbit monoclonal antibody staining (clone SP1) on formalin fixed, paraffin embedded specimens, unless otherwise noted.  Each assay is performed using appropriate positive and negative internal controls.    For MI: Ranges for interpretation: Invasive carcinoma cells exhibiting greater than or equal to 1% nuclear staining are considered POSITIVE.  Invasive carcinoma cells exhibiting less than 1% staining are considered NEGATIVE. (Reference: Arch Pathol Lab Med. doi:10.5858/arpa. [id number]) The stated steroid receptor activity for MI was derived from rabbit monoclonal antibody staining (clone 1E2) on formalin fixed, paraffin embedded specimens, unless otherwise noted.  The method employed was a standard peroxidase labeled  "polymer detection system. Each assay is performed using appropriate positive and negative internal controls.    For HER2: Ranges for interpretation: POSITIVE (3+): greater than or equal to 10% tumor cells with intense and uniform staining; EQUIVOCAL (2+): weak to moderate complete immunoreactivity in >10% of tumor cells or circumferential intense staining in less than or equal to 10% of cells; and NEGATIVE (1+): Faint weak immunoreactivity in >10% of tumor cells, but only a portion of the membrane is positive; NEGATIVE (0):No immunoreactivity or immunoreactivity in less than or equal to 10% of tumor cells. All tests are performed using a [employer] HER-2/re (4B5) rabbit monoclonal primary antibody on formalin fixed, paraffin embedded tissue, unless otherwise noted. Only invasive carcinoma is evaluated using the ASCO/CAP scoring system (Arch Pathol Lab Med 2018; 142: 6006-4086) unless otherwise specified.  External cell culture and tissue controls stain appropriately.        Clinical History       Right Breast Mass 10:30 10 CM From Nipple - 3 Cores - Open Coil Hydromark Placed      Gross Description       A: Received in formalin, labeled with the patient´s name and hospital number and \"Right Breast mass 10 oclock 10 cm fn\", are multiple irregular/cylindrical segments of yellow-white fatty soft tissue aggregating to 1.7 x 0.3 x 0.2 cm.  The specimen is submitted in toto in one cassette.  M[facility]    NOTE:  Ischemia time: Not provided.  This specimen was placed into formalin at: 1/30/24 14:07.   B:Received in formalin, labeled with the patient´s name and hospital number and \"Right axillary lymph node #1\", are multiple irregular/cylindrical segments of yellow-white fatty soft tissue aggregating to 1.8 x 0.3 x 0.2 cm.  The specimen is submitted in toto in one cassette.  [facility]      Disclaimer       One or more of the reagents used to perform assays on this specimen MAY have contained components considered to be analyte " specific reagents (ASR's).  ASR's have not been cleared or approved by the U.S. Food and Drug Administration.  These assays were developed and their performance characteristics determined by the Department of Pathology at [facility]. The FDA does not require this test to go through premarket FDA review. This test is used for clinical purposes. It should not be regarded as investigational or for research. This laboratory is certified under the Clinical Laboratory Improvement Amendments (CLIA) as qualified to perform high complexity clinical laboratory testing.  The assays were performed with appropriate positive and negative controls which stained appropriately.         3:19 PM

## 2024-02-06 ENCOUNTER — OFFICE VISIT (OUTPATIENT)
Dept: SURGICAL ONCOLOGY | Facility: CLINIC | Age: 39
End: 2024-02-06
Payer: COMMERCIAL

## 2024-02-06 ENCOUNTER — PATIENT MESSAGE (OUTPATIENT)
Dept: SURGICAL ONCOLOGY | Facility: CLINIC | Age: 39
End: 2024-02-06

## 2024-02-06 DIAGNOSIS — C50.411 MALIGNANT NEOPLASM OF UPPER-OUTER QUADRANT OF RIGHT BREAST IN FEMALE, ESTROGEN RECEPTOR POSITIVE (MULTI): ICD-10-CM

## 2024-02-06 DIAGNOSIS — Z17.0 MALIGNANT NEOPLASM OF UPPER-OUTER QUADRANT OF RIGHT BREAST IN FEMALE, ESTROGEN RECEPTOR POSITIVE (MULTI): ICD-10-CM

## 2024-02-06 PROCEDURE — 99215 OFFICE O/P EST HI 40 MIN: CPT | Performed by: SURGERY

## 2024-02-06 PROCEDURE — 99205 OFFICE O/P NEW HI 60 MIN: CPT | Performed by: SURGERY

## 2024-02-06 NOTE — TELEPHONE ENCOUNTER
From: Luz Rojo  To: Liliana Barrett DO  Sent: 2/6/2024 11:49 AM EST  Subject: Question     Hi there,  Can you remind me - when will I get the results of the scans? While I am there after the appointment? Thank you!

## 2024-02-06 NOTE — PROGRESS NOTES
BREAST SURGICAL ONCOLOGY NEW CANCER DIAGNOSIS    Assessment/Plan     Right breast IDC g2 ER 95% IA 95% HER2- measuring 2cm on imaging with 2 abnormal nodes  -[id number] stage IB      Discussed the pathology and treatment options with the patient and family.    First, we discussed surgical options: breast conservation vs mastectomy.      Breast conservation involves removal of the tumor with a rim of normal breast tissue called a margin.  BCT is often done in conjunction with radiation to decrease risk of recurrence.  Mastectomy involves removal of all breast tissue.  When mastectomy is recommended, referral to a plastic surgeon for reconstruction is recommended.      Given the small size of her cancer, she  could be an excellent candidate for breast conservation.  Given young age will order breast MRI for extent of disease.  MRF study candidate.    We then discussed the role of axillary staging. We discussed sentinel lymph node biopsy, indications for axillary node dissection and risks and benefits of each procedure.  Discussed surgery up front if only 2 lymph nodes appear abnormal on imaging studies.    Next, we discussed adjuvant therapies to surgery.      Radiation is usually recommended when BCT is performed.  Women 70 or greater with early stage hormone receptor positive cancer may be able to omit radiation.  Radiation is recommended after mastectomy for tumors larger than 5cm, positive lymph nodes, or positive margins.      The need for chemotherapy will be based on surgical pathology and will be decided by the medical oncologist.  The medical oncologist is also responsible for prescribing endocrine therapy for hormone receptor positive tumors.     Also recommend referral to genetics for young age at diagnosis.      Staging scans ordered given lm involvement to rule out distant metastasis.      Will follow up with me in clinic after scans and MRI to finalize our surgical plan.    Has indicated anxiety  related to diagnosis.   is leaning towards bilateral mastectomy.  Will refer to plastics to discuss potential reconstruction options.    Risks, benefits, alternatives and perioperative expectations were discussed in detail including: bleeding, infection, positive margin status necessitating return to OR, injury to nearby structures and risk of lymphedema with axillary surgery.    Subjective   Luz Rojo is a 38 y.o. female presents today for evaluation of recently diagnosed carcinoma of the right breast.     The patient was initially referred for evaluation of an abnormal mammogram first noted 2024 at [facility].     Follow-up diagnostic imaging on 2024 reveals a spiculated mass at 10:30 10cmFN measuring 2cm on imaging.  There are also 2 abnormal axillary lymph nodes.     A ultrasound guided biopsy was performed of mass and node on 2024.     A. Right breast, mass, 10:30, 10 cm from nipple, ultrasound-guided core needle biopsy:  - Invasive ductal carcinoma, preliminary grade 2. See comment.     B.  Right axillary lymph node, #1, ultrasound-guided core needle biopsy:  - Metastatic breast carcinoma.    Estrogen Receptor (clone SP1):                        Positive                                                                               Percentage with nuclear stainin-70%                                                                               Intensity of staining: Moderate to strong     Progesterone Receptor (clone SP2):                Positive  Percentage with nuclear stainin-90%                                                                               Intensity of staining: Strong     HER2 (clone 4B5):                                               Negative (1+)                                                                                   Menarche: 10  AFLB: 30  Menopause: no  HRT: no  Previous biopsies: No  Previous breast surgeries: No  Prior breast cancer: No    Family  history: Paternal Grandmother: Breast cancer, 40's  Maternal Grandfather: Colon cancer, 70's    Review of Systems   Constitutional symptoms: Denies generalized fatigue.  Denies weight change, fevers/chills, difficulty sleeping   Eyes: Denies double vision, glaucoma, cataracts.  Ear/nose/throat/mouth: Denies hearing changes, sore throat, sinus problems.  Cardiovascular: No chest pain.  Denies irregular heartbeat.  Denies ankle swelling.  Respiratory: No wheezing, cough, or shortness of breath.  Gastrointestinal: No abdominal pain,  No nausea/vomiting.  No indigestion/heartburn.  No change in bowel habits.  No constipation or diarrhea.   Genitourinary: No urinary incontinence.  No urinary frequency.  No painful urination.  Musculoskeletal: No bone pain, no muscle pain, no joint pain.   Integumentary: No rash. No masses.  No changes in moles.  No easy bruising.  Neurological: No headaches.  No tremors. No numbness/tingling.  Psychiatric: No anxiety. No depression.  Endocrine: No excessive thirst.  Not too hot or too cold.  Not tired or fatigued.    Hematological/lymphatic: No swollen glands or blood clotting problems.  No bruising.    Objective   Physical Exam  General: Alert and oriented x 3.  Mood and affect are appropriate.  HEENT: EOMI, PERRLA.   Neck: supple, no masses, no cervical adenopathy.  Cardiovascular: no lower extremity edema.  Pulmonary: breathing non labored on room air.  GI: Abdomen soft, no masses. No hepatomegaly or splenomegaly.  Lymph nodes: No supraclavicular or axillary adenopathy bilaterally. I cannot appreciate axillary adenopathy  Musculoskeletal: Full range of motion in the upper extremities bilaterally.  Neuro: denies dizziness, tremors    Breasts: The breasts were examined in both the seated and supine positions.    RIGHT: The nipple is everted without nipple discharge.  There are no skin changes, skin thickening, or dimpling. There is a mass deep in the right breast with biopsy related  changes.    LEFT: The nipple is everted without nipple discharge.  There are no skin changes, skin thickening, or dimpling. There are no masses palpated in the LEFT breast.     Radiology review: All images and reports were personally reviewed.         Liliana Barrett, DO

## 2024-02-07 ENCOUNTER — HOSPITAL ENCOUNTER (OUTPATIENT)
Dept: RADIOLOGY | Facility: HOSPITAL | Age: 39
Discharge: HOME | End: 2024-02-07
Payer: COMMERCIAL

## 2024-02-07 ENCOUNTER — PATIENT MESSAGE (OUTPATIENT)
Dept: SURGICAL ONCOLOGY | Facility: CLINIC | Age: 39
End: 2024-02-07
Payer: COMMERCIAL

## 2024-02-07 DIAGNOSIS — Z17.0 MALIGNANT NEOPLASM OF UPPER-OUTER QUADRANT OF RIGHT BREAST IN FEMALE, ESTROGEN RECEPTOR POSITIVE (MULTI): ICD-10-CM

## 2024-02-07 DIAGNOSIS — C50.411 MALIGNANT NEOPLASM OF UPPER-OUTER QUADRANT OF RIGHT BREAST IN FEMALE, ESTROGEN RECEPTOR POSITIVE (MULTI): ICD-10-CM

## 2024-02-07 PROCEDURE — 77049 MRI BREAST C-+ W/CAD BI: CPT

## 2024-02-07 PROCEDURE — A9575 INJ GADOTERATE MEGLUMI 0.1ML: HCPCS | Performed by: SURGERY

## 2024-02-07 PROCEDURE — 2550000001 HC RX 255 CONTRASTS: Performed by: SURGERY

## 2024-02-07 PROCEDURE — 77049 MRI BREAST C-+ W/CAD BI: CPT | Performed by: RADIOLOGY

## 2024-02-07 RX ORDER — GADOTERATE MEGLUMINE 376.9 MG/ML
20 INJECTION INTRAVENOUS
Status: COMPLETED | OUTPATIENT
Start: 2024-02-07 | End: 2024-02-07

## 2024-02-07 RX ADMIN — GADOTERATE MEGLUMINE 20 ML: 376.9 INJECTION INTRAVENOUS at 19:53

## 2024-02-07 NOTE — TELEPHONE ENCOUNTER
From: Luz Rojo  To: Liliana Barrett DO  Sent: 2/7/2024 1:13 PM EST  Subject: Follow-up question     I have been thinking about mastectomy versus lumpectomy and I had a question. Is it an option to get the lumpectomy first and then the mastectomy at another date? Thank you.

## 2024-02-07 NOTE — PROGRESS NOTES
"Ms. Rojo's mother was present during the appointment  History of Present Illness:  Luz Rojo \"Alice\" is a 38 y.o. female with a personal history of breast cancer.  Luz Rojo \"Alice\" was referred to the Cancer Genetics Clinic at [facility] by Liliana Barrett DO. Luz Rojo \"Alice\" is interested in genetic testing to clarify their personal risk for cancer, as well as the risks to their family members.    Cancer Medical History:  Personal history of cancer? Yes   Type: Breast    Age at diagnosis: 38  Summary: Right breast IDC g2 ER 95% GA 95% HER2- measuring 2cm on imaging with 2 abnormal nodes-[id number] stage IB. Surgery is scheduled for 3/22/24.    History of other cancers: No     Prior genetic testing? No     Cancer screening history:  PAP smear? Yes, most recent in  which wnl. All previous PAPs were wnl    Colonoscopy? No   Upper endoscopy? No   Dermatology? No   Other cancer screening? None    Reproductive History:  Number of children: 2  Number of pregnancies: 2  Age first birth: 30  Breast feeding? Yes ,14 months  Menarche (age): 10  Menopause (age): Premenopausal  OCP: Yes , 3-4 years  HRT: No     Hysterectomy? No   Oophorectomy? No     Family history:  A 4-generation pedigree was obtained and was significant for the following:   Two sister (living, 37 and 31) without cancer history  Mother (living, 67) without cancer history  Three maternal aunts (living, ages 69, 70, and 72) without cancer history  Maternal grandmother (, 82) without cancer history. She had two sisters (Ms. Rojo's great aunts) who had breast cancer, one at 55 and the other at 70  Maternal grandfather (, 87) with colon cancer at 75  Father (living, 71) without cancer history  Paternal aunt (living, 77) without cancer history  Paternal grandmother (, 50s) who passed of breast cancer diagnosed in her 50s  Paternal grandfather (, 50s) who had leukemia  Maternal ancestry is " Sal[place].  Paternal ancestry is Faroese and [place]ian. There is no known Ashkenazi Caodaism ancestry. Consanguinity was denied.       Discussion:  Luz Rojo is a 38 y.o. old female with a personal history of breast cancer.  Based on her personal history of breast cancer diagnosed under 50, Luz Rojo meets NCCN criteria for testing of the BRCA1 and BRCA2 genes. She is interested in testing, which is recommended, and was ordered today via the 23-gene BRCANext-Expanded panel from [employer]. Our discussion is summarized below.    We reviewed genes and chromosomes, inherited forms of breast and ovarian cancer, and the BRCA1 and BRCA2 genes causing HBOC. We discussed that most cancers are not due to an inherited genetic susceptibility. However, in about 5-10% of families, there is an inherited genetic mutation that can make a person more susceptible to developing certain forms of cancer. Within these families, we often see multiple family members with cancer, occurring in multiple generations. In addition, earlier onset and bilateral cancers are suggestive of an inherited form of cancer. Finally, there is a clustering of certain types of cancer in these families, such as breast and ovarian cancer.    We discussed the BRCA1 and BRCA2 genes, which are two genes that have been linked to early-onset breast and/or ovarian cancer. Changes in these genes (sometimes referred to as mutations) are inherited in a dominant pattern and confer >60% lifetime risk for breast cancer. This is elevated compared to the general population risk of 13%. In addition, BRCA1 and BRCA2 mutation carriers have up to a 58% lifetime risk for ovarian cancer, which is elevated over the 1.3% general population risk. Mutation carriers who have already been diagnosed with cancer have an increased risk to develop a second, contralateral breast cancer. BRCA2 gene mutation carriers have an increased risk for male breast cancer, prostate cancer,  melanoma and pancreatic cancer.    We discussed that there are multiple genes associated with increased breast cancer risk. Some genes, like the BRCA1 and BRCA2 genes, are considered highly penetrant breast cancer genes, meaning a mutation in the gene confers a high risk of breast cancer. Additionally, there are other intermediate (moderate risk) breast cancer genes. For some of the moderate risk genes, there is often limited information regarding the degree to which a mutation in the gene affects risk of different types of cancers. Additionally, for some of these moderate risk genes, the appropriate management for individuals who have a mutation in one of these genes is not always clear. Our knowledge about the cancer risks associated with mutations in these moderate risk genes is always growing, and we will likely be able to provide more comprehensive information in the future.     Gene mutations in most cancer risk genes, i.e. BRCA1 and BRCA2, are inherited in an autosomal dominant fashion. This means that if an individual has a change in either of these genes, their siblings, parents, and children have a 50% chance of also having that gene change and a 50% chance of not having the gene change.     We reviewed the three results we can get back:  1. Positive- Identified a change in a cancer gene that confers an increased cancer risk. We will discuss potential changes in management for her and her family based on the specific gene mutation found.  2. Negative-Clears her for the cancer predisposition syndrome we assessed, but cannot clear her for all cancer predisposition risks. She would continue to be screened based on her personal and family history.  3. Variant of Uncertain Significance (VUS) - We discussed should an uncertain result come back that this would be treated like a negative result (i.e. no management recommendation will be made no familial variant testing) as the implications of this finding are  currently unknown.    Lastly, we discussed the Genetic Information Non-discrimination Act (SHARRON) of 2008. We discussed that per this federal law, employers (at companies with 15 employees or greater) and health insurance companies (barring  and other  insurances) are forbidden to ask for and use genetic information against another person. As such, health insurance companies cannot ask for genetic information and use findings affect coverage or rates. However, luxury insurances such as life insurance, long term care insurance, and/or private disability insurance companies are not forbidden against using genetic information when an individual takes out a new/additional policy in one of those areas. As such, for unaffected individuals it could be beneficial to explore/take out policies in luxury insurance areas PRIOR to undergoing genetic testing.    Luz Rojo was counseled about hereditary cancer susceptibility including cancer risks, options for increased screening and/or risk reduction, genetic testing, and the implications for other family members. We discussed performing genetic testing in the context of a multi-gene panel test that looks at the BRCA1 and BRCA2, as well as moderate penetrant genes. She is interested in this approach, which is recommended and was ordered today via the 23-gene BRCANext-Expanded panel from [employer] [facility] [employer] [facility].    Results are typically available within 2-3 weeks (we will request results to be back before 3/22), and Luz Rojo will return to the Cancer Genetics Clinic to discuss her testing results. At that time, we will make recommendations for both Luz Rojo and her family members in terms of cancer screening and/or cancer risk reduction options.         PLAN:  1.  Luz Rojo elected to undergo genetic testing via a panel test that analyzes 23 genes associated with breast and other cancer risks. Consent for testing was verbalized and a plan made to  collect a blood sample. The sample will be sent to [facility] for analysis. Results are typically available in 2-3 weeks.    2. Luz Rojo will return to the Cancer Genetics Clinic in approximately 2-3 weeks to discuss her test results.     3. We remain available to Luz Rojo or her family members at [phone] if any questions arise regarding information discussed at today's visit.    Diana Gibbons MS, [facility]  Certified Genetic Counselor  [facility]  [phone]    Reviewed by:  Dr. Salima Carmona  Clinical   [facility]  [phone]

## 2024-02-08 ENCOUNTER — HOSPITAL ENCOUNTER (OUTPATIENT)
Dept: RADIOLOGY | Facility: CLINIC | Age: 39
Discharge: HOME | End: 2024-02-08
Payer: COMMERCIAL

## 2024-02-08 ENCOUNTER — PATIENT MESSAGE (OUTPATIENT)
Dept: SURGICAL ONCOLOGY | Facility: CLINIC | Age: 39
End: 2024-02-08
Payer: COMMERCIAL

## 2024-02-08 ENCOUNTER — APPOINTMENT (OUTPATIENT)
Dept: RADIOLOGY | Facility: HOSPITAL | Age: 39
End: 2024-02-08
Payer: COMMERCIAL

## 2024-02-08 DIAGNOSIS — C50.411 MALIGNANT NEOPLASM OF UPPER-OUTER QUADRANT OF RIGHT BREAST IN FEMALE, ESTROGEN RECEPTOR POSITIVE (MULTI): ICD-10-CM

## 2024-02-08 DIAGNOSIS — Z17.0 MALIGNANT NEOPLASM OF UPPER-OUTER QUADRANT OF RIGHT BREAST IN FEMALE, ESTROGEN RECEPTOR POSITIVE (MULTI): ICD-10-CM

## 2024-02-08 PROCEDURE — 78306 BONE IMAGING WHOLE BODY: CPT | Performed by: RADIOLOGY

## 2024-02-08 PROCEDURE — 74177 CT ABD & PELVIS W/CONTRAST: CPT | Performed by: RADIOLOGY

## 2024-02-08 PROCEDURE — 2550000001 HC RX 255 CONTRASTS: Performed by: SURGERY

## 2024-02-08 PROCEDURE — 3430000001 HC RX 343 DIAGNOSTIC RADIOPHARMACEUTICALS: Performed by: SURGERY

## 2024-02-08 PROCEDURE — 78306 BONE IMAGING WHOLE BODY: CPT

## 2024-02-08 PROCEDURE — 74177 CT ABD & PELVIS W/CONTRAST: CPT

## 2024-02-08 PROCEDURE — A9503 TC99M MEDRONATE: HCPCS | Performed by: SURGERY

## 2024-02-08 PROCEDURE — 71260 CT THORAX DX C+: CPT | Performed by: RADIOLOGY

## 2024-02-08 RX ADMIN — TECHNETIUM TC 99M MEDRONATE 26.9 MILLICURIE: 25 INJECTION, POWDER, FOR SOLUTION INTRAVENOUS at 10:55

## 2024-02-08 RX ADMIN — IOHEXOL 70 ML: 350 INJECTION, SOLUTION INTRAVENOUS at 11:33

## 2024-02-13 ENCOUNTER — OFFICE VISIT (OUTPATIENT)
Dept: SURGICAL ONCOLOGY | Facility: CLINIC | Age: 39
End: 2024-02-13
Payer: COMMERCIAL

## 2024-02-13 DIAGNOSIS — C50.411 MALIGNANT NEOPLASM OF UPPER-OUTER QUADRANT OF RIGHT BREAST IN FEMALE, ESTROGEN RECEPTOR POSITIVE (MULTI): ICD-10-CM

## 2024-02-13 DIAGNOSIS — Z17.0 MALIGNANT NEOPLASM OF UPPER-OUTER QUADRANT OF RIGHT BREAST IN FEMALE, ESTROGEN RECEPTOR POSITIVE (MULTI): ICD-10-CM

## 2024-02-13 PROCEDURE — 99215 OFFICE O/P EST HI 40 MIN: CPT | Performed by: SURGERY

## 2024-02-13 NOTE — PROGRESS NOTES
" BREAST SURGICAL ONCOLOGY NEW CANCER DIAGNOSIS    Assessment/Plan     Right breast IDC g2 ER 95% CA 95% HER2- measuring 2cm on imaging with 2 abnormal nodes  -[id number] stage IB      Reviewed work up since last imaging.  Staging scans essentially negative.  Some incidental benign findings (ovarian cysts).      With only 2 visualized abnormal lymph nodes I think  she would be best served with surgery first given the low likelihood for conversion of the axillary nodes with hormone positive HER2- cancer.   We discussed indications for axillary dissection if 3 or more nodes are positive.     She has genetics appt tomorrow.  She has indicated she is most interested in breast conservation for now.  If she tests positive for a gene mutation that substantially increases her risk for breast cancer she may wish to proceed with additional surgery with bilateral completion mastectomy.  We reviewed that she will require radiation with either lumpectomy or mastectomy given axillary lm involvement.  If she were to pursue mastectomy this would need to be performed prior to radiation.      Ultimately, Alice has decided to go ahead and schedule right magseed localized partial mastectomy with right axillary magseed localized sentinel lymph node biopsy, possible axillary node dissection on 3/22/2024.    Risks, benefits, alternatives and perioperative expectations were discussed in detail including: bleeding, infection, positive margin status necessitating return to OR, injury to nearby structures and risk of lymphedema with axillary surgery.    Subjective   Luz Rojo \"Alice\" is a 38 y.o. female presents today for evaluation of recently diagnosed carcinoma of the right breast.     The patient was initially referred for evaluation of an abnormal mammogram first noted 1/24/2024 at [facility].     Follow-up diagnostic imaging on 1/26/2024 reveals a spiculated mass at 10:30 10cmFN measuring 2cm on imaging.  There are also 2 abnormal " axillary lymph nodes.     A ultrasound guided biopsy was performed of mass and node on 2024.     A. Right breast, mass, 10:30, 10 cm from nipple, ultrasound-guided core needle biopsy:  - Invasive ductal carcinoma, preliminary grade 2. See comment.     B.  Right axillary lymph node, #1, ultrasound-guided core needle biopsy:  - Metastatic breast carcinoma.    Estrogen Receptor (clone SP1):                        Positive                                                                               Percentage with nuclear stainin-70%                                                                               Intensity of staining: Moderate to strong     Progesterone Receptor (clone SP2):                Positive  Percentage with nuclear stainin-90%                                                                               Intensity of staining: Strong     HER2 (clone 4B5):                                               Negative (1+)                                                                                 Staging scans 2024 are negative for distant metastatic disease.    MRI of the breast 2024 with breast mass measuring 2cm with 2 abnormal right axillary nodes.    Menarche: 10  AFLB: 30  Menopause: no  HRT: no  Previous biopsies: No  Previous breast surgeries: No  Prior breast cancer: No    Family history: Paternal Grandmother: Breast cancer, 40's  Maternal Grandfather: Colon cancer, 70's    Review of Systems   Constitutional symptoms: Denies generalized fatigue.  Denies weight change, fevers/chills, difficulty sleeping   Eyes: Denies double vision, glaucoma, cataracts.  Ear/nose/throat/mouth: Denies hearing changes, sore throat, sinus problems.  Cardiovascular: No chest pain.  Denies irregular heartbeat.  Denies ankle swelling.  Respiratory: No wheezing, cough, or shortness of breath.  Gastrointestinal: No abdominal pain,  No nausea/vomiting.  No indigestion/heartburn.  No change in  bowel habits.  No constipation or diarrhea.   Genitourinary: No urinary incontinence.  No urinary frequency.  No painful urination.  Musculoskeletal: No bone pain, no muscle pain, no joint pain.   Integumentary: No rash. No masses.  No changes in moles.  No easy bruising.  Neurological: No headaches.  No tremors. No numbness/tingling.  Psychiatric: No anxiety. No depression.  Endocrine: No excessive thirst.  Not too hot or too cold.  Not tired or fatigued.    Hematological/lymphatic: No swollen glands or blood clotting problems.  No bruising.    Objective   Physical Exam  General: Alert and oriented x 3.  Mood and affect are appropriate.  HEENT: EOMI, PERRLA.   Neck: supple, no masses, no cervical adenopathy.  Cardiovascular: no lower extremity edema.  Pulmonary: breathing non labored on room air.  GI: Abdomen soft, no masses. No hepatomegaly or splenomegaly.  Lymph nodes: No supraclavicular or axillary adenopathy bilaterally. I cannot appreciate axillary adenopathy  Musculoskeletal: Full range of motion in the upper extremities bilaterally.  Neuro: denies dizziness, tremors    Breasts: The breasts were examined in both the seated and supine positions.    RIGHT: The nipple is everted without nipple discharge.  There are no skin changes, skin thickening, or dimpling. There is a mass deep in the right breast with biopsy related changes.    LEFT: The nipple is everted without nipple discharge.  There are no skin changes, skin thickening, or dimpling. There are no masses palpated in the LEFT breast.     Radiology review: All images and reports were personally reviewed.         Liliana Barrett DO

## 2024-02-14 ENCOUNTER — APPOINTMENT (OUTPATIENT)
Dept: RADIOLOGY | Facility: HOSPITAL | Age: 39
End: 2024-02-14
Payer: COMMERCIAL

## 2024-02-14 ENCOUNTER — TELEMEDICINE CLINICAL SUPPORT (OUTPATIENT)
Dept: GENETICS | Facility: CLINIC | Age: 39
End: 2024-02-14
Payer: COMMERCIAL

## 2024-02-14 ENCOUNTER — LAB (OUTPATIENT)
Dept: LAB | Facility: LAB | Age: 39
End: 2024-02-14
Payer: COMMERCIAL

## 2024-02-14 DIAGNOSIS — C50.411 MALIGNANT NEOPLASM OF UPPER-OUTER QUADRANT OF RIGHT BREAST IN FEMALE, ESTROGEN RECEPTOR POSITIVE (MULTI): Primary | ICD-10-CM

## 2024-02-14 DIAGNOSIS — Z17.0 MALIGNANT NEOPLASM OF UPPER-OUTER QUADRANT OF RIGHT BREAST IN FEMALE, ESTROGEN RECEPTOR POSITIVE (MULTI): Primary | ICD-10-CM

## 2024-02-14 DIAGNOSIS — C50.411 MALIGNANT NEOPLASM OF UPPER-OUTER QUADRANT OF RIGHT BREAST IN FEMALE, ESTROGEN RECEPTOR POSITIVE (MULTI): ICD-10-CM

## 2024-02-14 DIAGNOSIS — Z17.0 MALIGNANT NEOPLASM OF UPPER-OUTER QUADRANT OF RIGHT BREAST IN FEMALE, ESTROGEN RECEPTOR POSITIVE (MULTI): ICD-10-CM

## 2024-02-14 PROCEDURE — 9990000009 MISCELLANEOUS GENETICS TEST

## 2024-02-15 ENCOUNTER — PATIENT MESSAGE (OUTPATIENT)
Dept: SURGICAL ONCOLOGY | Facility: CLINIC | Age: 39
End: 2024-02-15
Payer: COMMERCIAL

## 2024-02-15 NOTE — TELEPHONE ENCOUNTER
From: Luz Rojo  To: Liliana Barrett DO  Sent: 2/15/2024 11:11 AM EST  Subject: Another question     Hi again,  Another question :) I am meeting with the plastic surgeon Monday and getting the seed put in prior. I see there are 3 appts scheduled related to the seed. Is this correct? The last one is just before the plastic surgery appt. Just want to be sure it’s enough time.    Also, if something should sway me to go the double mastectomy/reconstruction route is the adjuvant therapy different than a lumpectomy? I know we touched on this at my appointment but I can’t remember completely. Thank you!

## 2024-02-19 ENCOUNTER — HOSPITAL ENCOUNTER (OUTPATIENT)
Dept: RADIOLOGY | Facility: HOSPITAL | Age: 39
Discharge: HOME | End: 2024-02-19
Payer: COMMERCIAL

## 2024-02-19 ENCOUNTER — OFFICE VISIT (OUTPATIENT)
Dept: PLASTIC SURGERY | Facility: CLINIC | Age: 39
End: 2024-02-19
Payer: COMMERCIAL

## 2024-02-19 VITALS
WEIGHT: 220.2 LBS | DIASTOLIC BLOOD PRESSURE: 98 MMHG | BODY MASS INDEX: 35.39 KG/M2 | HEIGHT: 66 IN | HEART RATE: 93 BPM | SYSTOLIC BLOOD PRESSURE: 138 MMHG

## 2024-02-19 DIAGNOSIS — C50.411 MALIGNANT NEOPLASM OF UPPER-OUTER QUADRANT OF RIGHT BREAST IN FEMALE, ESTROGEN RECEPTOR POSITIVE (MULTI): ICD-10-CM

## 2024-02-19 DIAGNOSIS — Z17.0 MALIGNANT NEOPLASM OF UPPER-OUTER QUADRANT OF RIGHT BREAST IN FEMALE, ESTROGEN RECEPTOR POSITIVE (MULTI): ICD-10-CM

## 2024-02-19 DIAGNOSIS — Z17.0 MALIGNANT NEOPLASM OF UPPER-OUTER QUADRANT OF RIGHT BREAST IN FEMALE, ESTROGEN RECEPTOR POSITIVE (MULTI): Primary | ICD-10-CM

## 2024-02-19 DIAGNOSIS — C50.411 MALIGNANT NEOPLASM OF UPPER-OUTER QUADRANT OF RIGHT BREAST IN FEMALE, ESTROGEN RECEPTOR POSITIVE (MULTI): Primary | ICD-10-CM

## 2024-02-19 PROCEDURE — 19285 PERQ DEV BREAST 1ST US IMAG: CPT | Mod: RIGHT SIDE | Performed by: RADIOLOGY

## 2024-02-19 PROCEDURE — A4648 IMPLANTABLE TISSUE MARKER: HCPCS

## 2024-02-19 PROCEDURE — 2500000005 HC RX 250 GENERAL PHARMACY W/O HCPCS: Performed by: RADIOLOGY

## 2024-02-19 PROCEDURE — 77065 DX MAMMO INCL CAD UNI: CPT | Mod: RIGHT SIDE | Performed by: RADIOLOGY

## 2024-02-19 PROCEDURE — 99205 OFFICE O/P NEW HI 60 MIN: CPT | Performed by: PHYSICIAN ASSISTANT

## 2024-02-19 PROCEDURE — 19285 PERQ DEV BREAST 1ST US IMAG: CPT | Mod: RT

## 2024-02-19 PROCEDURE — 77065 DX MAMMO INCL CAD UNI: CPT | Mod: RT

## 2024-02-19 PROCEDURE — 2780000003 HC OR 278 NO HCPCS

## 2024-02-19 PROCEDURE — 10035 PLMT SFT TISS LOCLZJ DEV 1ST: CPT | Mod: RIGHT SIDE | Performed by: RADIOLOGY

## 2024-02-19 PROCEDURE — 19286 PERQ DEV BREAST ADD US IMAG: CPT | Mod: RT

## 2024-02-19 RX ADMIN — Medication 20 ML: at 10:00

## 2024-02-19 NOTE — PROGRESS NOTES
"    Department of Plastic and Reconstructive Surgery           Initial Office Consultation    Date: 24  Referring Provider: Dr. Barrett  Primary Care Provider: Richa Naranjo MD      Subjective   Luz MENDES Darrel \"Alfonzo" is a 38 y.o. female who was referred by Dr. Barrett  for evaluation of newly diagnosed IDC breast cancer.    She is a healthy 39yo female who presents at the recommendation of Dr. Barrett. She endorses that on her initial baseline mammogram a right breast mass was found. She underwent   diagnostic imaging and biopsy which yielded spiculated mass 10:30 10cmFN with 2 abnormal axillary lymph nodes. Biopsy showed Invasive ductal carcinoma. She is pending genetic testing. She endorses that if genetics results position she is most interested in bilateral mastectomy. She is undecided if genetics are negative what course she would like to go.     PMH:  hypothyroidism, Right breast cancer, anxiety  Surgical Hx:  x2  Family Hx: Pgm Breast cancer, Mgf colon cancer  Smoking: none      Review of Systems   All other systems have been reviewed with the patient and have been found to be negative with exception to the chief complaint as mentioned in the history of present illness.    ROS: As noted in history of present illness    - CONSTITUTIONAL: Denies weight loss, fever and chills.  - HEENT: Denies changes in vision and hearing.  - RESPIRATORY: Denies SOB and cough, difficulty breathing  - CV: Denies palpitations and CP  - GI: Denies abdominal pain, nausea, vomiting and diarrhea.  - : Denies dysuria and urinary frequency.  - MSK: Denies myalgia and joint pain.  - SKIN: Denies rash and pruritus.  - NEUROLOGICAL: Denies headache and syncope.      Objective   Vital Signs:   Vitals:    24 1154   BP: (!) 138/98   Pulse: 93     Gen: interactive and pleasant  Head: NCAT  Eyes: EOMI, PERRLA  Mouth: MMM  Throat: trachea midline  Cor: RRR  Pulm: nonlabored breathing  Abd: s/nt/nd  Neuro: " "AAOx3  Ext: extremities perfused    Focused exam of the: breast                                  R                  L  SN:NAC             31cm              32cm  NAC:IMF            16cm             17cm         BW                    15cm              15cm        NAC diam         6cm              7cm                                                                                                 Ptosis Grade     3               3                                                            Imaging    Mammogram 1/24/24  IMPRESSION:  Right breast small rounded partially obscure mass in the upper outer  quadrant posteriorly. Spot compression views and ultrasound  recommended for further evaluation.    Breast US 1/26/24  IMPRESSION:  Suspicious right breast mass with associated axillary adenopathy. A  surgical consultation for an ultrasound-guided biopsy is recommended.  The patient is being seen today by Anaid Dickerson CNP. The biopsies  will be scheduled. A pre-procedure form has been completed.      BI-RADS CATEGORY:  5 Highly Suggestive of Malignancy.    Breast Biopsy 1/26/24  ADDENDUM:  The final pathology for right breast mass at the 10:30 position, 10  cm from the nipple is invasive ductal carcinoma, preliminarily grade  2. This malignant result is concordant with the imaging features of  the area biopsied. Surgical management recommended.      Estimated size of mass/extent of disease: Mass measures 2.0 x 1.3 x  1.5 cm on ultrasound 01/26/2024.Ipsilateral lymph nodes: Biopsy of right axillary lymph node is metastatic breast carcinoma. This malignant result is concordant. 3  of 5 visualized right axillary lymph nodes appeared abnormal.    MRI breast 2/7/24  IMPRESSION:  Biopsy-proven malignancy, deep upper outer right breast with  metastatic carcinoma to right axillary lymph node. No additional  areas of concern within the right breast.      Assessment/Plan     Luz VAUGHN Rojo \"Alice\" is a 38 y.o. female who was " referred by Dr. Barrett for evaluation of newly diagnosed IDC breast cancer.      we had a long discussion regarding options for unilateral and bilateral reconstruction, we discussed indications for both, and stressed that oncologic decisions and discussions are best had with her breast surgeon and oncology team.    We reviewed autologous and implant-based reconstruction. It was laid out for her in lay English the benefits and risks of both types of reconstruction. We discussed the most common reconstructions performed, we discussed the anticipated recovery and follow up for each.    We described to her autologous tissue reconstruction including pedicled TRAM, pedicled Latissimus (with and without expander/implant), free Gracilis, profunda artery , free Gluteus-based and free DOM-type reconstructions. We described the post-operative recovery, donor site morbidity, basic technical aspects of each reconstruction, post-operative protocols and outcomes of each. We reviewed that in our practice, we routinely perform autologous reconstruction in two stages. We reviewed the reasoning for this, and stressed that it is our practice to avoid radiating the final reconstruction.    We discussed implant based reconstruction including total submuscular, sub-pectoral, and pre-pectoral tissue expander based surgery.  We discussed direct-to-implant reconstruction and stressed that if this were an option, it would essentially mean that she might be able to forego expansion, but noted that this was not a single stage reconstruction. We described the benefits of each and post-operative protocol for each.  We went on to discuss the risks of implant-based reconstruction, including infection, delayed healing, and flap necrosis.  We discussed the possibility of delayed reconstruction in the rare event that the mastectomy flaps blood supply is deemed unsuitable for reconstruction intraoperatively.    We went into great detail on  what effects radiation, if indicated, may have on the breast skin and torso shape.  We discussed asymmetry, contracture, and fibrosis of the skin, we mentioned rare radiation sequelae including osteoradionecrosis, and stressed that these would likely indicate her for autologous reconstruction.  We discussed with her common implant complications as rippling, animation deformity, rupture, and capsular contracture. Furthermore, We discussed the current cases of anaplastic T cell lymphoma as examples of risks of form-stable and textured implant based reconstruction.    They were given supplemental reading information regarding our discussion in the form of the breast reconstruction hand out tool. She repeated to me in her own words what we had discussed, and she was able to do so satisfactorily.    After reviewing our discussion she is most interested in bilateral DTI wise pattern mastectomy with her genetics proves to be positive. She is unsure if she would like mastectomy vs lumpectomy w/ radiation if her genetics are negative. We will plan to follow up with this patient via phone visit in 1-2 weeks to determine plan for breast reconstruction. She notes that if her genetics is positive she has plans for lumpectomy on 3/22 with Dr. Barrett, she would be interested in delayed mastectomy and reconstruction if indicated.     Plan:   Phone follow up with Dr. Hurtado in 1-2 weeks     I spent 60 minutes with this patient. Greater than 50% of this time was spent in the counselling and/or coordination of care of this patient.  This note was created using voice recognition software and was not corrected for typographical or grammatical errors.    Signature: Chica Chiu PA-C  Date: 02/19/24

## 2024-02-22 ENCOUNTER — PATIENT MESSAGE (OUTPATIENT)
Dept: SURGICAL ONCOLOGY | Facility: CLINIC | Age: 39
End: 2024-02-22
Payer: COMMERCIAL

## 2024-02-22 NOTE — TELEPHONE ENCOUNTER
From: Luz Rojo  To: Liliana Barrett DO  Sent: 2/22/2024 8:04 AM EST  Subject: Question     I wanted to let you know that I surprisingly tested positive for Covid yesterday. I feel fine - just a little tickle in my throat. My  is positive and I took a test bc I was curious. I am shocked I was positive bc I feel fine. I’m guessing this is day 4. I’m mostly anxious this will exacerbate things. Any insight or reassurance is greatly appreciated!

## 2024-03-01 ENCOUNTER — PATIENT MESSAGE (OUTPATIENT)
Dept: SURGICAL ONCOLOGY | Facility: CLINIC | Age: 39
End: 2024-03-01
Payer: COMMERCIAL

## 2024-03-01 NOTE — TELEPHONE ENCOUNTER
From: Luz Rojo  To: Liliana Barrett DO  Sent: 3/1/2024 11:23 AM EST  Subject: Post surgery question     Hi there,  I just want to be sure I understand something correctly. Based on imaging, I have 2 abnormal lymph nodes and one with some mild swelling, correct? Does a dissection happen if MORE than 3 nodes need to go, or is also standard of care for JUST 3? I’m trying to prepare myself mentally. If I end up having a dissection, will I be referred for PT? I’m reading that is important. Thank you!

## 2024-03-04 ENCOUNTER — PATIENT MESSAGE (OUTPATIENT)
Dept: SURGICAL ONCOLOGY | Facility: CLINIC | Age: 39
End: 2024-03-04
Payer: COMMERCIAL

## 2024-03-06 ENCOUNTER — LAB (OUTPATIENT)
Dept: LAB | Facility: LAB | Age: 39
End: 2024-03-06
Payer: COMMERCIAL

## 2024-03-06 ENCOUNTER — OFFICE VISIT (OUTPATIENT)
Dept: PLASTIC SURGERY | Facility: CLINIC | Age: 39
End: 2024-03-06
Payer: COMMERCIAL

## 2024-03-06 DIAGNOSIS — Z17.0 MALIGNANT NEOPLASM OF UPPER-OUTER QUADRANT OF RIGHT BREAST IN FEMALE, ESTROGEN RECEPTOR POSITIVE (MULTI): Primary | ICD-10-CM

## 2024-03-06 DIAGNOSIS — C50.411 MALIGNANT NEOPLASM OF UPPER-OUTER QUADRANT OF RIGHT BREAST IN FEMALE, ESTROGEN RECEPTOR POSITIVE (MULTI): ICD-10-CM

## 2024-03-06 DIAGNOSIS — Z17.0 MALIGNANT NEOPLASM OF UPPER-OUTER QUADRANT OF RIGHT BREAST IN FEMALE, ESTROGEN RECEPTOR POSITIVE (MULTI): ICD-10-CM

## 2024-03-06 DIAGNOSIS — C50.411 MALIGNANT NEOPLASM OF UPPER-OUTER QUADRANT OF RIGHT BREAST IN FEMALE, ESTROGEN RECEPTOR POSITIVE (MULTI): Primary | ICD-10-CM

## 2024-03-06 LAB
ANION GAP SERPL CALC-SCNC: 12 MMOL/L (ref 10–20)
BUN SERPL-MCNC: 14 MG/DL (ref 6–23)
CALCIUM SERPL-MCNC: 9.4 MG/DL (ref 8.6–10.6)
CHLORIDE SERPL-SCNC: 101 MMOL/L (ref 98–107)
CO2 SERPL-SCNC: 28 MMOL/L (ref 21–32)
CREAT SERPL-MCNC: 0.63 MG/DL (ref 0.5–1.05)
EGFRCR SERPLBLD CKD-EPI 2021: >90 ML/MIN/1.73M*2
ERYTHROCYTE [DISTWIDTH] IN BLOOD BY AUTOMATED COUNT: 12.1 % (ref 11.5–14.5)
GLUCOSE SERPL-MCNC: 89 MG/DL (ref 74–99)
HCT VFR BLD AUTO: 36.1 % (ref 36–46)
HGB BLD-MCNC: 12.4 G/DL (ref 12–16)
MCH RBC QN AUTO: 29.7 PG (ref 26–34)
MCHC RBC AUTO-ENTMCNC: 34.3 G/DL (ref 32–36)
MCV RBC AUTO: 87 FL (ref 80–100)
NRBC BLD-RTO: 0 /100 WBCS (ref 0–0)
PLATELET # BLD AUTO: 379 X10*3/UL (ref 150–450)
POTASSIUM SERPL-SCNC: 4.3 MMOL/L (ref 3.5–5.3)
RBC # BLD AUTO: 4.17 X10*6/UL (ref 4–5.2)
SODIUM SERPL-SCNC: 137 MMOL/L (ref 136–145)
WBC # BLD AUTO: 7.6 X10*3/UL (ref 4.4–11.3)

## 2024-03-06 PROCEDURE — 36415 COLL VENOUS BLD VENIPUNCTURE: CPT

## 2024-03-06 PROCEDURE — 99212 OFFICE O/P EST SF 10 MIN: CPT | Performed by: PHYSICIAN ASSISTANT

## 2024-03-06 PROCEDURE — 85027 COMPLETE CBC AUTOMATED: CPT

## 2024-03-06 PROCEDURE — 80048 BASIC METABOLIC PNL TOTAL CA: CPT

## 2024-03-06 NOTE — PROGRESS NOTES
"    Department of Plastic and Reconstructive Surgery           Initial Office Consultation    Date: 24  Referring Provider: Dr. Barrett  Primary Care Provider: Richa Naranjo MD      Subjective   Luz A Darrel \"Alice\" is a 38 y.o. female who was referred by Dr. Barrett  for evaluation of newly diagnosed IDC breast cancer. She presents today for follow up to discuss surgical plan.     She presents today for follow up visit. She endorses that her genetics came back negative.     PMH:  hypothyroidism, Right breast cancer, anxiety  Surgical Hx:  x2  Family Hx: Pgm Breast cancer, Mgf colon cancer  Smoking: none      Review of Systems   All other systems have been reviewed with the patient and have been found to be negative with exception to the chief complaint as mentioned in the history of present illness.    ROS: As noted in history of present illness    - CONSTITUTIONAL: Denies weight loss, fever and chills.  - HEENT: Denies changes in vision and hearing.  - RESPIRATORY: Denies SOB and cough, difficulty breathing  - CV: Denies palpitations and CP  - GI: Denies abdominal pain, nausea, vomiting and diarrhea.  - : Denies dysuria and urinary frequency.  - MSK: Denies myalgia and joint pain.  - SKIN: Denies rash and pruritus.  - NEUROLOGICAL: Denies headache and syncope.      Objective   Vital Signs:   There were no vitals filed for this visit.    No exam today audio only visit.     From previous.   Focused exam of the: breast                                  R                  L  SN:NAC             31cm              32cm  NAC:IMF            16cm             17cm         BW                    15cm              15cm        NAC diam         6cm              7cm                                                                                                 Ptosis Grade     3               3                                                            Imaging    Mammogram 24  IMPRESSION:  Right " "breast small rounded partially obscure mass in the upper outer  quadrant posteriorly. Spot compression views and ultrasound  recommended for further evaluation.    Breast US 1/26/24  IMPRESSION:  Suspicious right breast mass with associated axillary adenopathy. A  surgical consultation for an ultrasound-guided biopsy is recommended.  The patient is being seen today by Anaid Dickerson CNP. The biopsies  will be scheduled. A pre-procedure form has been completed.      BI-RADS CATEGORY:  5 Highly Suggestive of Malignancy.    Breast Biopsy 1/26/24  ADDENDUM:  The final pathology for right breast mass at the 10:30 position, 10  cm from the nipple is invasive ductal carcinoma, preliminarily grade  2. This malignant result is concordant with the imaging features of  the area biopsied. Surgical management recommended.      Estimated size of mass/extent of disease: Mass measures 2.0 x 1.3 x  1.5 cm on ultrasound 01/26/2024.Ipsilateral lymph nodes: Biopsy of right axillary lymph node is metastatic breast carcinoma. This malignant result is concordant. 3  of 5 visualized right axillary lymph nodes appeared abnormal.    MRI breast 2/7/24  IMPRESSION:  Biopsy-proven malignancy, deep upper outer right breast with  metastatic carcinoma to right axillary lymph node. No additional  areas of concern within the right breast.      Assessment/Plan     Luz Rojo \"Alfonzo" is a 38 y.o. female who was referred by Dr. Barrett for evaluation of newly diagnosed IDC breast cancer. She presents today for follow up.     She endorses that her genetics testing resulted negative. To this end she is electing to move forward with breast conservation surgery. She is scheduled for lumpectomy on 3/22/24 with Dr. Barrett followed likely by radiation and possibly chemo pending surgical pathology. She will follow up with our office PRN after surgery.     Plan:   -no current plan for reconstruction, patient moving forward with plan for lumpectomy on 3/22 " with Dr. Barrett.     I spent 10 minutes with this patient. Greater than 50% of this time was spent in the counselling and/or coordination of care of this patient.  This note was created using voice recognition software and was not corrected for typographical or grammatical errors.    Signature: Chiac Chiu PA-C  Date: 03/06/24

## 2024-03-07 ASSESSMENT — DUKE ACTIVITY SCORE INDEX (DASI)
CAN YOU DO LIGHT WORK AROUND THE HOUSE LIKE DUSTING OR WASHING DISHES: YES
CAN YOU PARTICIPATE IN MODERATE RECREATIONAL ACTIVITIES LIKE GOLF, BOWLING, DANCING, DOUBLES TENNIS OR THROWING A BASEBALL OR FOOTBALL: YES
CAN YOU TAKE CARE OF YOURSELF (EAT, DRESS, BATHE, OR USE TOILET): YES
CAN YOU HAVE SEXUAL RELATIONS: YES
CAN YOU CLIMB A FLIGHT OF STAIRS OR WALK UP A HILL: YES
TOTAL_SCORE: 58.2
CAN YOU WALK INDOORS, SUCH AS AROUND YOUR HOUSE: YES
CAN YOU DO MODERATE WORK AROUND THE HOUSE LIKE VACUUMING, SWEEPING FLOORS OR CARRYING GROCERIES: YES
CAN YOU DO YARD WORK LIKE RAKING LEAVES, WEEDING OR PUSHING A MOWER: YES
CAN YOU DO HEAVY WORK AROUND THE HOUSE LIKE SCRUBBING FLOORS OR LIFTING AND MOVING HEAVY FURNITURE: YES
CAN YOU RUN A SHORT DISTANCE: YES
CAN YOU WALK A BLOCK OR TWO ON LEVEL GROUND: YES
CAN YOU PARTICIPATE IN STRENOUS SPORTS LIKE SWIMMING, SINGLES TENNIS, FOOTBALL, BASKETBALL, OR SKIING: YES
DASI METS SCORE: 9.9

## 2024-03-07 ASSESSMENT — CHADS2 SCORE
HYPERTENSION: NO
PRIOR STROKE OR TIA OR THROMBOEMBOLISM: NO
CHF: NO
CHADS2 SCORE: 0
DIABETES: NO
AGE GREATER THAN OR EQUAL TO 75: NO

## 2024-03-07 ASSESSMENT — ACTIVITIES OF DAILY LIVING (ADL): ADL_SCORE: 0

## 2024-03-07 ASSESSMENT — LIFESTYLE VARIABLES: SMOKING_STATUS: NONSMOKER

## 2024-03-08 ENCOUNTER — PRE-ADMISSION TESTING (OUTPATIENT)
Dept: PREADMISSION TESTING | Facility: HOSPITAL | Age: 39
End: 2024-03-08
Payer: COMMERCIAL

## 2024-03-08 VITALS
HEART RATE: 84 BPM | HEIGHT: 66 IN | WEIGHT: 218.48 LBS | DIASTOLIC BLOOD PRESSURE: 98 MMHG | OXYGEN SATURATION: 100 % | TEMPERATURE: 97.5 F | SYSTOLIC BLOOD PRESSURE: 142 MMHG | BODY MASS INDEX: 35.11 KG/M2 | RESPIRATION RATE: 16 BRPM

## 2024-03-08 DIAGNOSIS — Z17.0 MALIGNANT NEOPLASM OF UPPER-OUTER QUADRANT OF RIGHT BREAST IN FEMALE, ESTROGEN RECEPTOR POSITIVE (MULTI): ICD-10-CM

## 2024-03-08 DIAGNOSIS — Z01.818 PREOP EXAMINATION: Primary | ICD-10-CM

## 2024-03-08 DIAGNOSIS — C50.411 MALIGNANT NEOPLASM OF UPPER-OUTER QUADRANT OF RIGHT BREAST IN FEMALE, ESTROGEN RECEPTOR POSITIVE (MULTI): ICD-10-CM

## 2024-03-08 LAB — SCAN RESULT: NORMAL

## 2024-03-08 PROCEDURE — 99203 OFFICE O/P NEW LOW 30 MIN: CPT

## 2024-03-08 ASSESSMENT — PAIN - FUNCTIONAL ASSESSMENT: PAIN_FUNCTIONAL_ASSESSMENT: 0-10

## 2024-03-08 NOTE — H&P (VIEW-ONLY)
"CPM/PAT Evaluation       Name: Luz Rojo (Luz Rojo \"Alice\")  /Age: 1985/38 y.o.     In-Person       Chief Complaint: Breast cancer    HPI  Pleasant 39 y/o female presents with breast cancer. She had a mammogram with ultrasound that showed an abnormality. She also had a breast biopsy. She has been extremely anxious since the diagnosis. States that her BP has been elevated at every doctors appointment because she is so stressed. Denies recent fever/illness/chills.     Past Medical History:   Diagnosis Date    Anxiety     Breast cancer (CMS/HCC)     Candidiasis, unspecified     Yeast infection    Depression     Encounter for routine postpartum follow-up 2018    Routine postpartum follow-up    Hyperlipidemia     Hypothyroidism     Maternal care for unspecified type scar from previous  delivery 2018    Previous  delivery affecting pregnancy    Nonpurulent mastitis associated with the puerperium 2018    Mastitis, postpartum    Personal history of other diseases of the respiratory system 2019    History of sore throat    Personal history of other mental and behavioral disorders     History of depression    PONV (postoperative nausea and vomiting)     Streptococcal pharyngitis 2019    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    Family History   Problem Relation Name Age of Onset    Breast cancer Paternal Grandmother  40 - 49       No Known Allergies    Prior to Admission medications    Medication Sig Start Date End Date Taking? Authorizing Provider   cholecalciferol (Vitamin D3) 5,000 Units tablet Take 1 tablet (5,000 Units) by mouth once daily.    Historical Provider, MD   cyanocobalamin, vitamin B-12, (VITAMIN B-12 " ORAL) Take 1 capsule by mouth once daily.    Historical Provider, MD   hydrOXYzine HCL (Atarax) 10 mg tablet Take 1 tablet (10 mg) by mouth every 8 hours if needed for itching. 2/1/24 3/2/24  Richa Naranjo MD   levothyroxine (Synthroid, Levoxyl) 88 mcg tablet TAKE 1 TABLET BY MOUTH EVERY DAY 8/18/23   Richa Naranjo MD   omega-3 acid ethyl esters (Lovaza) 1 gram capsule TAKE 1 CAPSULE (1 G) BY MOUTH ONCE DAILY. 10/31/23   Richa Naranjo MD   sertraline (Zoloft) 50 mg tablet Take 1 tablet (50 mg) by mouth once daily.    Historical Provider, MD        Constitutional: Negative for fever, chills, or sweats   ENMT: Negative for nasal discharge, congestion, ear pain, mouth pain, throat pain. Positive for contacts.    Respiratory: Negative for cough, wheezing, shortness of breath   Cardiac: Negative for chest pain, dyspnea on exertion, palpitations   Gastrointestinal: Negative for nausea, vomiting, diarrhea, constipation, abdominal pain  Genitourinary: Negative for dysuria, flank pain, frequency, hematuria   Musculoskeletal: Negative for decreased ROM, pain, swelling, weakness   Neurological: Negative for dizziness, confusion, headache  Psychiatric: Negative for mood changes   Skin: Negative for itching, rash, ulcer    Hematologic/Lymph: Negative for bruising, easy bleeding  Allergic/Immunologic: Negative itching, sneezing, swelling      Physical Exam  Vitals reviewed.   Constitutional:       Appearance: Normal appearance.   HENT:      Head: Normocephalic.      Mouth/Throat:      Mouth: Mucous membranes are moist.      Pharynx: Oropharynx is clear.   Eyes:      Pupils: Pupils are equal, round, and reactive to light.   Cardiovascular:      Rate and Rhythm: Normal rate and regular rhythm.      Heart sounds: Normal heart sounds.   Pulmonary:      Effort: Pulmonary effort is normal.      Breath sounds: Normal breath sounds.   Abdominal:      General: Bowel sounds are normal.      Palpations: Abdomen is soft.    Musculoskeletal:         General: Normal range of motion.      Cervical back: Normal range of motion.   Skin:     General: Skin is warm and dry.   Neurological:      General: No focal deficit present.      Mental Status: She is alert and oriented to person, place, and time.   Psychiatric:         Mood and Affect: Mood normal.         Behavior: Behavior normal.        PAT AIRWAY:   Airway:     Mallampati::  II    Neck ROM::  Full  normal        Visit Vitals  BP (!) 142/98   Pulse 84   Temp 36.4 °C (97.5 °F) (Temporal)   Resp 16       DASI Risk Score      Flowsheet Row Most Recent Value   DASI SCORE 58.2   METS Score (Will be calculated only when all the questions are answered) 9.9          Caprini DVT Assessment      Flowsheet Row Most Recent Value   DVT Score 9   Current Status Major surgery planned, including arthroscopic and laproscopic (1-2 hours), Present cancer or chemotherapy   History Prior major surgery   Age 40-59 years   BMI 31-40 (Obesity)          Modified Frailty Index      Flowsheet Row Most Recent Value   Modified Frailty Index Calculator 0          CHADS2 Stroke Risk  Current as of 34 minutes ago        N/A 3 - 100%: High Risk   2 - 3%: Medium Risk   0 - 2%: Low Risk     Last Change: N/A          This score determines the patient's risk of having a stroke if the patient has atrial fibrillation.        This score is not applicable to this patient. Components are not calculated.          Revised Cardiac Risk Index    No data to display       Apfel Simplified Score    No data to display       Risk Analysis Index Results This Encounter         3/7/2024  1208             HARPER Cancer History: Patient indicates history of cancer    Total Risk Analysis Index Score Without Cancer: 14    Total Risk Analysis Index Score: 35          Stop Bang Score      Flowsheet Row Most Recent Value   Do you snore loudly? 1   Do you often feel tired or fatigued after your sleep? 0   Has anyone ever observed you stop  breathing in your sleep? 0   Do you have or are you being treated for high blood pressure? 0   Recent BMI (Calculated) 35.6   Is BMI greater than 35 kg/m2? 1=Yes   Age older than 50 years old? 0=No   Gender - Male 0=No            Assessment and Plan:     OR with Dr. Barrett on 3/22  Labs ordered per Dr. Barrett-completed on 3/6  Urine preg ordered for DOS     Patient has had nausea/vomiting with her c-sections. She also has severe motion sickness. She would like a scopolamine patch prior to surgery .   See risk scores as previously documented.

## 2024-03-08 NOTE — CPM/PAT H&P
"CPM/PAT Evaluation       Name: Luz Rojo (Luz Rojo \"Alice\")  /Age: 1985/38 y.o.     In-Person       Chief Complaint: Breast cancer    HPI  Pleasant 37 y/o female presents with breast cancer. She had a mammogram with ultrasound that showed an abnormality. She also had a breast biopsy. She has been extremely anxious since the diagnosis. States that her BP has been elevated at every doctors appointment because she is so stressed. Denies recent fever/illness/chills.     Past Medical History:   Diagnosis Date    Anxiety     Breast cancer (CMS/HCC)     Candidiasis, unspecified     Yeast infection    Depression     Encounter for routine postpartum follow-up 2018    Routine postpartum follow-up    Hyperlipidemia     Hypothyroidism     Maternal care for unspecified type scar from previous  delivery 2018    Previous  delivery affecting pregnancy    Nonpurulent mastitis associated with the puerperium 2018    Mastitis, postpartum    Personal history of other diseases of the respiratory system 2019    History of sore throat    Personal history of other mental and behavioral disorders     History of depression    PONV (postoperative nausea and vomiting)     Streptococcal pharyngitis 2019    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    Family History   Problem Relation Name Age of Onset    Breast cancer Paternal Grandmother  40 - 49       No Known Allergies    Prior to Admission medications    Medication Sig Start Date End Date Taking? Authorizing Provider   cholecalciferol (Vitamin D3) 5,000 Units tablet Take 1 tablet (5,000 Units) by mouth once daily.    Historical Provider, MD   cyanocobalamin, vitamin B-12, (VITAMIN B-12 " ORAL) Take 1 capsule by mouth once daily.    Historical Provider, MD   hydrOXYzine HCL (Atarax) 10 mg tablet Take 1 tablet (10 mg) by mouth every 8 hours if needed for itching. 2/1/24 3/2/24  Richa Naranjo MD   levothyroxine (Synthroid, Levoxyl) 88 mcg tablet TAKE 1 TABLET BY MOUTH EVERY DAY 8/18/23   Richa Naranjo MD   omega-3 acid ethyl esters (Lovaza) 1 gram capsule TAKE 1 CAPSULE (1 G) BY MOUTH ONCE DAILY. 10/31/23   Richa Naranjo MD   sertraline (Zoloft) 50 mg tablet Take 1 tablet (50 mg) by mouth once daily.    Historical Provider, MD        Constitutional: Negative for fever, chills, or sweats   ENMT: Negative for nasal discharge, congestion, ear pain, mouth pain, throat pain. Positive for contacts.    Respiratory: Negative for cough, wheezing, shortness of breath   Cardiac: Negative for chest pain, dyspnea on exertion, palpitations   Gastrointestinal: Negative for nausea, vomiting, diarrhea, constipation, abdominal pain  Genitourinary: Negative for dysuria, flank pain, frequency, hematuria   Musculoskeletal: Negative for decreased ROM, pain, swelling, weakness   Neurological: Negative for dizziness, confusion, headache  Psychiatric: Negative for mood changes   Skin: Negative for itching, rash, ulcer    Hematologic/Lymph: Negative for bruising, easy bleeding  Allergic/Immunologic: Negative itching, sneezing, swelling      Physical Exam  Vitals reviewed.   Constitutional:       Appearance: Normal appearance.   HENT:      Head: Normocephalic.      Mouth/Throat:      Mouth: Mucous membranes are moist.      Pharynx: Oropharynx is clear.   Eyes:      Pupils: Pupils are equal, round, and reactive to light.   Cardiovascular:      Rate and Rhythm: Normal rate and regular rhythm.      Heart sounds: Normal heart sounds.   Pulmonary:      Effort: Pulmonary effort is normal.      Breath sounds: Normal breath sounds.   Abdominal:      General: Bowel sounds are normal.      Palpations: Abdomen is soft.    Musculoskeletal:         General: Normal range of motion.      Cervical back: Normal range of motion.   Skin:     General: Skin is warm and dry.   Neurological:      General: No focal deficit present.      Mental Status: She is alert and oriented to person, place, and time.   Psychiatric:         Mood and Affect: Mood normal.         Behavior: Behavior normal.        PAT AIRWAY:   Airway:     Mallampati::  II    Neck ROM::  Full  normal        Visit Vitals  BP (!) 142/98   Pulse 84   Temp 36.4 °C (97.5 °F) (Temporal)   Resp 16       DASI Risk Score      Flowsheet Row Most Recent Value   DASI SCORE 58.2   METS Score (Will be calculated only when all the questions are answered) 9.9          Caprini DVT Assessment      Flowsheet Row Most Recent Value   DVT Score 9   Current Status Major surgery planned, including arthroscopic and laproscopic (1-2 hours), Present cancer or chemotherapy   History Prior major surgery   Age 40-59 years   BMI 31-40 (Obesity)          Modified Frailty Index      Flowsheet Row Most Recent Value   Modified Frailty Index Calculator 0          CHADS2 Stroke Risk  Current as of 34 minutes ago        N/A 3 - 100%: High Risk   2 - 3%: Medium Risk   0 - 2%: Low Risk     Last Change: N/A          This score determines the patient's risk of having a stroke if the patient has atrial fibrillation.        This score is not applicable to this patient. Components are not calculated.          Revised Cardiac Risk Index    No data to display       Apfel Simplified Score    No data to display       Risk Analysis Index Results This Encounter         3/7/2024  1208             HARPER Cancer History: Patient indicates history of cancer    Total Risk Analysis Index Score Without Cancer: 14    Total Risk Analysis Index Score: 35          Stop Bang Score      Flowsheet Row Most Recent Value   Do you snore loudly? 1   Do you often feel tired or fatigued after your sleep? 0   Has anyone ever observed you stop  breathing in your sleep? 0   Do you have or are you being treated for high blood pressure? 0   Recent BMI (Calculated) 35.6   Is BMI greater than 35 kg/m2? 1=Yes   Age older than 50 years old? 0=No   Gender - Male 0=No            Assessment and Plan:     OR with Dr. Barrett on 3/22  Labs ordered per Dr. Barrett-completed on 3/6  Urine preg ordered for DOS     Patient has had nausea/vomiting with her c-sections. She also has severe motion sickness. She would like a scopolamine patch prior to surgery .   See risk scores as previously documented.

## 2024-03-08 NOTE — PREPROCEDURE INSTRUCTIONS
Medication List            Accurate as of March 8, 2024 10:56 AM. Always use your most recent med list.                hydrOXYzine HCL 10 mg tablet  Commonly known as: Atarax  Take 1 tablet (10 mg) by mouth every 8 hours if needed for itching.  Medication Adjustments for Surgery: Continue until night before surgery     levothyroxine 88 mcg tablet  Commonly known as: Synthroid, Levoxyl  TAKE 1 TABLET BY MOUTH EVERY DAY  Medication Adjustments for Surgery: Take morning of surgery with sip of water, no other fluids     omega-3 acid ethyl esters 1 gram capsule  Commonly known as: Lovaza  TAKE 1 CAPSULE (1 G) BY MOUTH ONCE DAILY.  Medication Adjustments for Surgery: Stop 7 days before surgery     sertraline 50 mg tablet  Commonly known as: Zoloft  Medication Adjustments for Surgery: Take morning of surgery with sip of water, no other fluids     VITAMIN B-12 ORAL  Medication Adjustments for Surgery: Stop 7 days before surgery     Vitamin D3 5,000 Units tablet  Generic drug: cholecalciferol  Medication Adjustments for Surgery: Stop 7 days before surgery                                  PRE-OPERATIVE INSTRUCTIONS    You will receive notification one business day prior to your procedure to confirm your arrival time. It is important that you answer your phone and/or check your messages during this time. If you do not hear from the surgery center by 5 pm. the day before your procedure, please call [phone].     Please enter the building through the Outpatient entrance and take the elevator off the lobby to the 2nd floor then check in at the Outpatient Surgery desk on the 2nd floor.    INSTRUCTIONS:  Talk to your surgeon for instructions if you should stop your aspirin, blood thinner, or diabetes medicines.  DO NOT take any multivitamins or over the counter supplements for 7-10 days before surgery.  If not being admitted, you must have an adult immediately available to drive you home after surgery. We also highly  recommend you have someone stay with you for the entire day and night of your surgery.  For children having surgery, a parent or legal guardian must accompany them to the surgery center. If this is not possible, please call [phone] to make additional arrangements.  For adults who are unable to consent or make medical decisions for themselves, a legal guardian or Power of  must accompany them to the surgery center. If this is not possible, please call [phone] to make additional arrangements.  Wear comfortable, loose fitting clothing.  All jewelry and piercings must be removed. If you are unable to remove an item or have a dermal piercing, please be sure to tell the nurse when you arrive for surgery.  Nail polish and make-up must be removed.  Avoid smoking or consuming alcohol for 24 hours before surgery.  To help prevent infection, please take a shower/bath and wash your hair the night before and/or morning of surgery (or follow other specific bathing instructions provided).    Preoperative Fasting Guidelines    Why must I stop eating and drinking near surgery time?  With sedation, food or liquid in your stomach can enter your lungs causing serious complications  Increases nausea and vomiting    When do I need to stop eating and drinking before my surgery?  Do not eat any solid food after midnight the night before your surgery/procedure.  You may have up to TEN ounces of clear liquid until TWO hours before your instructed arrival time to the hospital.  This includes water, black tea/coffee, (no milk or cream) apple juice, and electrolyte drinks (Gatorade).   You may chew gum until TWO hours before your surgery/procedure    If you have any questions or concerns, please call Pre-Admission Testing at [phone].       Home Preoperative Antibacterial Shower with Chlorhexidine gluconate (CHG)     What is a home preoperative antibacterial shower?  This shower is a way of cleaning the skin with a germ  killing solution before surgery. The solution contains chlorhexidine gluconate, commonly known as CHG. CHG is a skin cleanser with germ killing ability. Let your doctor know if you are allergic to chlorhexidine.    Why do I need to take a preoperative antibacterial shower?  Skin is not sterile. It is best to try to make your skin as free of germs as possible before surgery. Proper cleansing with a germ killing soap before surgery can lower the number of germs on your skin. This helps to reduce the risk of infection at the surgical site. Following the instructions listed below will help you prepare your skin for surgery.    How do I use the solution?  Begin using your CHG soap the night before and again the morning of your procedure.   Do not shave the day before or day of surgery.  Remove all jewelry until after surgery. Take off rings and take out all body-piercing jewelry.  Wash your face and hair with normal soap and shampoo before you use the CHG soap.  Apply the CHG solution to a clean wet washcloth. Move away from the water to avoid premature rinsing of the CHG soap as you are applying. Firmly lather your entire body from the neck down. Do not use CHG on your face, eyes, ears, or genitals.   Pay special attention to the area where your incisions will be located.  Do not scrub your skin too hard.  It is important to allow the CHG soap to sit on your skin for 3-5 minutes.  Rinse the solution off your body completely. Do not wash with your normal soap after the CHG soap solution.  Pat yourself dry with a clean, soft towel.  Do not apply powders, lotions or deodorants as these might block how the CHG soap works.   Dress in clean clothing.  Be sure to sleep with clean, freshly laundered sheets.  Be aware that CHG can cause stains on fabric. Rinse your washcloth and other linens that have contact with CHG completely. Use only non-chlorine detergents to launder the items used.

## 2024-03-13 ENCOUNTER — PATIENT MESSAGE (OUTPATIENT)
Dept: SURGICAL ONCOLOGY | Facility: CLINIC | Age: 39
End: 2024-03-13
Payer: COMMERCIAL

## 2024-03-13 DIAGNOSIS — F32.A ANXIETY AND DEPRESSION: Primary | ICD-10-CM

## 2024-03-13 DIAGNOSIS — F41.9 ANXIETY AND DEPRESSION: Primary | ICD-10-CM

## 2024-03-13 RX ORDER — BUSPIRONE HYDROCHLORIDE 5 MG/1
5 TABLET ORAL 3 TIMES DAILY
Qty: 90 TABLET | Refills: 0 | Status: SHIPPED | OUTPATIENT
Start: 2024-03-13 | End: 2025-03-13

## 2024-03-13 NOTE — TELEPHONE ENCOUNTER
From: Luz Rojo  To: Liliana Barrett DO  Sent: 3/13/2024 10:36 AM EDT  Subject: Anxiety and surgery     Hi there,  As we are getting near surgery day I am getting very anxious. Would I be able to take something the morning of? Like a Xanax or Ativan? I’ve developed a bit of a “white coat” syndrome regarding my blood pressure. I’ve always prided myself on my low readings and lately they’ve been high in the office. (Low at home). I know it’s all anxiety.   I’m also terrified it has spread while waiting surgery. Any reassurance you can give me is super appreciated. So happy only 9 days to go!! Thanks so much.

## 2024-03-16 DIAGNOSIS — E03.9 HYPOTHYROIDISM, UNSPECIFIED: Primary | ICD-10-CM

## 2024-03-18 ENCOUNTER — PATIENT MESSAGE (OUTPATIENT)
Dept: SURGICAL ONCOLOGY | Facility: CLINIC | Age: 39
End: 2024-03-18
Payer: COMMERCIAL

## 2024-03-18 NOTE — TELEPHONE ENCOUNTER
From: Luz Rojo  To: Liliana Barrett DO  Sent: 3/18/2024 9:35 AM EDT  Subject: Friday     Hello,  I was looking at the schedule for surgery. Can you tell me what this last part is? Also about how long does it take and how long will I be there? If a node dissection is done, does that add more time? Thank you!

## 2024-03-19 RX ORDER — LEVOTHYROXINE SODIUM 88 UG/1
TABLET ORAL
Qty: 90 TABLET | Refills: 1 | Status: SHIPPED | OUTPATIENT
Start: 2024-03-19

## 2024-03-21 ENCOUNTER — PREP FOR PROCEDURE (OUTPATIENT)
Dept: SURGICAL ONCOLOGY | Facility: HOSPITAL | Age: 39
End: 2024-03-21
Payer: COMMERCIAL

## 2024-03-21 ENCOUNTER — ANESTHESIA EVENT (OUTPATIENT)
Dept: OPERATING ROOM | Facility: HOSPITAL | Age: 39
End: 2024-03-21
Payer: COMMERCIAL

## 2024-03-21 DIAGNOSIS — C50.411 MALIGNANT NEOPLASM OF UPPER-OUTER QUADRANT OF RIGHT BREAST IN FEMALE, ESTROGEN RECEPTOR POSITIVE (MULTI): Primary | ICD-10-CM

## 2024-03-21 DIAGNOSIS — Z17.0 MALIGNANT NEOPLASM OF UPPER-OUTER QUADRANT OF RIGHT BREAST IN FEMALE, ESTROGEN RECEPTOR POSITIVE (MULTI): Primary | ICD-10-CM

## 2024-03-21 RX ORDER — CEFAZOLIN SODIUM 2 G/100ML
2 INJECTION, SOLUTION INTRAVENOUS ONCE
Status: CANCELLED | OUTPATIENT
Start: 2024-03-21 | End: 2024-03-21

## 2024-03-22 ENCOUNTER — HOSPITAL ENCOUNTER (OUTPATIENT)
Dept: RADIOLOGY | Facility: HOSPITAL | Age: 39
Discharge: HOME | End: 2024-03-22
Payer: COMMERCIAL

## 2024-03-22 ENCOUNTER — ANESTHESIA (OUTPATIENT)
Dept: OPERATING ROOM | Facility: HOSPITAL | Age: 39
End: 2024-03-22
Payer: COMMERCIAL

## 2024-03-22 ENCOUNTER — HOSPITAL ENCOUNTER (OUTPATIENT)
Facility: HOSPITAL | Age: 39
Setting detail: OUTPATIENT SURGERY
Discharge: HOME | End: 2024-03-22
Attending: SURGERY | Admitting: SURGERY
Payer: COMMERCIAL

## 2024-03-22 VITALS
HEART RATE: 86 BPM | WEIGHT: 218 LBS | OXYGEN SATURATION: 98 % | BODY MASS INDEX: 35.03 KG/M2 | RESPIRATION RATE: 18 BRPM | DIASTOLIC BLOOD PRESSURE: 82 MMHG | TEMPERATURE: 97.5 F | HEIGHT: 66 IN | SYSTOLIC BLOOD PRESSURE: 168 MMHG

## 2024-03-22 DIAGNOSIS — Z17.0 MALIGNANT NEOPLASM OF UPPER-OUTER QUADRANT OF RIGHT BREAST IN FEMALE, ESTROGEN RECEPTOR POSITIVE (MULTI): Primary | ICD-10-CM

## 2024-03-22 DIAGNOSIS — C50.411 MALIGNANT NEOPLASM OF UPPER-OUTER QUADRANT OF RIGHT BREAST IN FEMALE, ESTROGEN RECEPTOR POSITIVE (MULTI): ICD-10-CM

## 2024-03-22 DIAGNOSIS — Z17.0 MALIGNANT NEOPLASM OF UPPER-OUTER QUADRANT OF RIGHT BREAST IN FEMALE, ESTROGEN RECEPTOR POSITIVE (MULTI): ICD-10-CM

## 2024-03-22 DIAGNOSIS — C50.411 MALIGNANT NEOPLASM OF UPPER-OUTER QUADRANT OF RIGHT BREAST IN FEMALE, ESTROGEN RECEPTOR POSITIVE (MULTI): Primary | ICD-10-CM

## 2024-03-22 LAB — HCG UR QL IA.RAPID: NEGATIVE

## 2024-03-22 PROCEDURE — 7100000010 HC PHASE TWO TIME - EACH INCREMENTAL 1 MINUTE: Performed by: SURGERY

## 2024-03-22 PROCEDURE — 88342 IMHCHEM/IMCYTCHM 1ST ANTB: CPT | Performed by: STUDENT IN AN ORGANIZED HEALTH CARE EDUCATION/TRAINING PROGRAM

## 2024-03-22 PROCEDURE — 2500000001 HC RX 250 WO HCPCS SELF ADMINISTERED DRUGS (ALT 637 FOR MEDICARE OP): Performed by: ANESTHESIOLOGY

## 2024-03-22 PROCEDURE — 88332 PATH CONSLTJ SURG EA ADD BLK: CPT | Mod: TC,SUR,STJLAB | Performed by: PATHOLOGY

## 2024-03-22 PROCEDURE — 88305 TISSUE EXAM BY PATHOLOGIST: CPT | Performed by: STUDENT IN AN ORGANIZED HEALTH CARE EDUCATION/TRAINING PROGRAM

## 2024-03-22 PROCEDURE — 38900 IO MAP OF SENT LYMPH NODE: CPT | Performed by: SURGERY

## 2024-03-22 PROCEDURE — 3700000001 HC GENERAL ANESTHESIA TIME - INITIAL BASE CHARGE: Performed by: SURGERY

## 2024-03-22 PROCEDURE — 3700000002 HC GENERAL ANESTHESIA TIME - EACH INCREMENTAL 1 MINUTE: Performed by: SURGERY

## 2024-03-22 PROCEDURE — 2500000004 HC RX 250 GENERAL PHARMACY W/ HCPCS (ALT 636 FOR OP/ED): Performed by: ANESTHESIOLOGY

## 2024-03-22 PROCEDURE — 2500000005 HC RX 250 GENERAL PHARMACY W/O HCPCS: Performed by: SURGERY

## 2024-03-22 PROCEDURE — 7100000009 HC PHASE TWO TIME - INITIAL BASE CHARGE: Performed by: SURGERY

## 2024-03-22 PROCEDURE — A19301 PR MASTECTOMY, PARTIAL: Performed by: ANESTHESIOLOGY

## 2024-03-22 PROCEDURE — 76098 X-RAY EXAM SURGICAL SPECIMEN: CPT | Mod: RT

## 2024-03-22 PROCEDURE — 19301 PARTIAL MASTECTOMY: CPT | Performed by: SURGERY

## 2024-03-22 PROCEDURE — 2500000005 HC RX 250 GENERAL PHARMACY W/O HCPCS: Performed by: NURSE ANESTHETIST, CERTIFIED REGISTERED

## 2024-03-22 PROCEDURE — 88307 TISSUE EXAM BY PATHOLOGIST: CPT | Mod: TC,MUE,STJLAB | Performed by: SURGERY

## 2024-03-22 PROCEDURE — 76098 X-RAY EXAM SURGICAL SPECIMEN: CPT | Mod: RIGHT SIDE | Performed by: RADIOLOGY

## 2024-03-22 PROCEDURE — 88331 PATH CONSLTJ SURG 1 BLK 1SPC: CPT | Performed by: PATHOLOGY

## 2024-03-22 PROCEDURE — 2500000004 HC RX 250 GENERAL PHARMACY W/ HCPCS (ALT 636 FOR OP/ED): Performed by: NURSE ANESTHETIST, CERTIFIED REGISTERED

## 2024-03-22 PROCEDURE — 81025 URINE PREGNANCY TEST: CPT | Performed by: SURGERY

## 2024-03-22 PROCEDURE — 3430000001 HC RX 343 DIAGNOSTIC RADIOPHARMACEUTICALS: Performed by: SURGERY

## 2024-03-22 PROCEDURE — A9520 TC99 TILMANOCEPT DIAG 0.5MCI: HCPCS | Performed by: SURGERY

## 2024-03-22 PROCEDURE — 2720000007 HC OR 272 NO HCPCS: Performed by: SURGERY

## 2024-03-22 PROCEDURE — 3600000004 HC OR TIME - INITIAL BASE CHARGE - PROCEDURE LEVEL FOUR: Performed by: SURGERY

## 2024-03-22 PROCEDURE — 7100000001 HC RECOVERY ROOM TIME - INITIAL BASE CHARGE: Performed by: SURGERY

## 2024-03-22 PROCEDURE — A19301 PR MASTECTOMY, PARTIAL: Performed by: NURSE ANESTHETIST, CERTIFIED REGISTERED

## 2024-03-22 PROCEDURE — 88307 TISSUE EXAM BY PATHOLOGIST: CPT | Performed by: STUDENT IN AN ORGANIZED HEALTH CARE EDUCATION/TRAINING PROGRAM

## 2024-03-22 PROCEDURE — 88305 TISSUE EXAM BY PATHOLOGIST: CPT | Mod: TC,SUR | Performed by: SURGERY

## 2024-03-22 PROCEDURE — 38792 RA TRACER ID OF SENTINL NODE: CPT

## 2024-03-22 PROCEDURE — 96372 THER/PROPH/DIAG INJ SC/IM: CPT | Mod: 59 | Performed by: SURGERY

## 2024-03-22 PROCEDURE — 3600000009 HC OR TIME - EACH INCREMENTAL 1 MINUTE - PROCEDURE LEVEL FOUR: Performed by: SURGERY

## 2024-03-22 PROCEDURE — 38792 RA TRACER ID OF SENTINL NODE: CPT | Performed by: SURGERY

## 2024-03-22 PROCEDURE — 7100000002 HC RECOVERY ROOM TIME - EACH INCREMENTAL 1 MINUTE: Performed by: SURGERY

## 2024-03-22 PROCEDURE — 88313 SPECIAL STAINS GROUP 2: CPT | Performed by: STUDENT IN AN ORGANIZED HEALTH CARE EDUCATION/TRAINING PROGRAM

## 2024-03-22 PROCEDURE — 38525 BIOPSY/REMOVAL LYMPH NODES: CPT | Performed by: SURGERY

## 2024-03-22 PROCEDURE — 2500000004 HC RX 250 GENERAL PHARMACY W/ HCPCS (ALT 636 FOR OP/ED): Mod: JW | Performed by: SURGERY

## 2024-03-22 RX ORDER — ACETAMINOPHEN 325 MG/1
650 TABLET ORAL ONCE
Status: DISCONTINUED | OUTPATIENT
Start: 2024-03-22 | End: 2024-03-22 | Stop reason: HOSPADM

## 2024-03-22 RX ORDER — FENTANYL CITRATE 50 UG/ML
INJECTION, SOLUTION INTRAMUSCULAR; INTRAVENOUS AS NEEDED
Status: DISCONTINUED | OUTPATIENT
Start: 2024-03-22 | End: 2024-03-22

## 2024-03-22 RX ORDER — ALBUTEROL SULFATE 0.83 MG/ML
2.5 SOLUTION RESPIRATORY (INHALATION)
Status: DISCONTINUED | OUTPATIENT
Start: 2024-03-22 | End: 2024-03-22 | Stop reason: HOSPADM

## 2024-03-22 RX ORDER — LIDOCAINE HYDROCHLORIDE 20 MG/ML
INJECTION, SOLUTION INFILTRATION; PERINEURAL AS NEEDED
Status: DISCONTINUED | OUTPATIENT
Start: 2024-03-22 | End: 2024-03-22

## 2024-03-22 RX ORDER — METOCLOPRAMIDE HYDROCHLORIDE 5 MG/ML
10 INJECTION INTRAMUSCULAR; INTRAVENOUS ONCE AS NEEDED
Status: COMPLETED | OUTPATIENT
Start: 2024-03-22 | End: 2024-03-22

## 2024-03-22 RX ORDER — HYDROMORPHONE HYDROCHLORIDE 1 MG/ML
1 INJECTION, SOLUTION INTRAMUSCULAR; INTRAVENOUS; SUBCUTANEOUS EVERY 5 MIN PRN
Status: DISCONTINUED | OUTPATIENT
Start: 2024-03-22 | End: 2024-03-22 | Stop reason: HOSPADM

## 2024-03-22 RX ORDER — MIDAZOLAM HYDROCHLORIDE 1 MG/ML
1 INJECTION, SOLUTION INTRAMUSCULAR; INTRAVENOUS ONCE AS NEEDED
Status: DISCONTINUED | OUTPATIENT
Start: 2024-03-22 | End: 2024-03-22 | Stop reason: HOSPADM

## 2024-03-22 RX ORDER — HYDROCODONE BITARTRATE AND ACETAMINOPHEN 5; 325 MG/1; MG/1
1 TABLET ORAL EVERY 6 HOURS PRN
Qty: 12 TABLET | Refills: 0 | Status: SHIPPED | OUTPATIENT
Start: 2024-03-22 | End: 2024-03-29

## 2024-03-22 RX ORDER — PHENYLEPHRINE HCL IN 0.9% NACL 1 MG/10 ML
SYRINGE (ML) INTRAVENOUS AS NEEDED
Status: DISCONTINUED | OUTPATIENT
Start: 2024-03-22 | End: 2024-03-22

## 2024-03-22 RX ORDER — SCOLOPAMINE TRANSDERMAL SYSTEM 1 MG/1
PATCH, EXTENDED RELEASE TRANSDERMAL AS NEEDED
Status: DISCONTINUED | OUTPATIENT
Start: 2024-03-22 | End: 2024-03-22

## 2024-03-22 RX ORDER — SODIUM CHLORIDE, SODIUM LACTATE, POTASSIUM CHLORIDE, CALCIUM CHLORIDE 600; 310; 30; 20 MG/100ML; MG/100ML; MG/100ML; MG/100ML
100 INJECTION, SOLUTION INTRAVENOUS CONTINUOUS
Status: DISCONTINUED | OUTPATIENT
Start: 2024-03-22 | End: 2024-03-22 | Stop reason: HOSPADM

## 2024-03-22 RX ORDER — LABETALOL HYDROCHLORIDE 5 MG/ML
5 INJECTION, SOLUTION INTRAVENOUS
Status: DISCONTINUED | OUTPATIENT
Start: 2024-03-22 | End: 2024-03-22 | Stop reason: HOSPADM

## 2024-03-22 RX ORDER — HYDROMORPHONE HYDROCHLORIDE 1 MG/ML
INJECTION, SOLUTION INTRAMUSCULAR; INTRAVENOUS; SUBCUTANEOUS AS NEEDED
Status: DISCONTINUED | OUTPATIENT
Start: 2024-03-22 | End: 2024-03-22

## 2024-03-22 RX ORDER — ONDANSETRON HYDROCHLORIDE 2 MG/ML
INJECTION, SOLUTION INTRAVENOUS AS NEEDED
Status: DISCONTINUED | OUTPATIENT
Start: 2024-03-22 | End: 2024-03-22

## 2024-03-22 RX ORDER — MIDAZOLAM HYDROCHLORIDE 1 MG/ML
INJECTION, SOLUTION INTRAMUSCULAR; INTRAVENOUS AS NEEDED
Status: DISCONTINUED | OUTPATIENT
Start: 2024-03-22 | End: 2024-03-22

## 2024-03-22 RX ORDER — CEFAZOLIN 1 G/1
INJECTION, POWDER, FOR SOLUTION INTRAVENOUS AS NEEDED
Status: DISCONTINUED | OUTPATIENT
Start: 2024-03-22 | End: 2024-03-22

## 2024-03-22 RX ORDER — DIPHENHYDRAMINE HYDROCHLORIDE 50 MG/ML
12.5 INJECTION INTRAMUSCULAR; INTRAVENOUS ONCE AS NEEDED
Status: DISCONTINUED | OUTPATIENT
Start: 2024-03-22 | End: 2024-03-22 | Stop reason: HOSPADM

## 2024-03-22 RX ORDER — BUPIVACAINE HYDROCHLORIDE 2.5 MG/ML
INJECTION, SOLUTION INFILTRATION; PERINEURAL AS NEEDED
Status: DISCONTINUED | OUTPATIENT
Start: 2024-03-22 | End: 2024-03-22 | Stop reason: HOSPADM

## 2024-03-22 RX ORDER — HYDROCODONE BITARTRATE AND ACETAMINOPHEN 5; 325 MG/1; MG/1
1 TABLET ORAL EVERY 4 HOURS PRN
Status: DISCONTINUED | OUTPATIENT
Start: 2024-03-22 | End: 2024-03-22 | Stop reason: HOSPADM

## 2024-03-22 RX ORDER — IBUPROFEN 600 MG/1
600 TABLET ORAL ONCE
Status: COMPLETED | OUTPATIENT
Start: 2024-03-22 | End: 2024-03-22

## 2024-03-22 RX ORDER — HYDRALAZINE HYDROCHLORIDE 20 MG/ML
5 INJECTION INTRAMUSCULAR; INTRAVENOUS EVERY 30 MIN PRN
Status: DISCONTINUED | OUTPATIENT
Start: 2024-03-22 | End: 2024-03-22 | Stop reason: HOSPADM

## 2024-03-22 RX ORDER — ISOSULFAN BLUE 50 MG/5ML
INJECTION, SOLUTION SUBCUTANEOUS AS NEEDED
Status: DISCONTINUED | OUTPATIENT
Start: 2024-03-22 | End: 2024-03-22 | Stop reason: HOSPADM

## 2024-03-22 RX ORDER — CEFAZOLIN SODIUM 2 G/100ML
2 INJECTION, SOLUTION INTRAVENOUS ONCE
Status: DISCONTINUED | OUTPATIENT
Start: 2024-03-22 | End: 2024-03-22 | Stop reason: HOSPADM

## 2024-03-22 RX ORDER — PROPOFOL 10 MG/ML
INJECTION, EMULSION INTRAVENOUS AS NEEDED
Status: DISCONTINUED | OUTPATIENT
Start: 2024-03-22 | End: 2024-03-22

## 2024-03-22 RX ADMIN — PROMETHAZINE HYDROCHLORIDE 6.25 MG: 25 INJECTION INTRAMUSCULAR; INTRAVENOUS at 14:22

## 2024-03-22 RX ADMIN — FENTANYL CITRATE 25 MCG: 50 INJECTION, SOLUTION INTRAMUSCULAR; INTRAVENOUS at 13:25

## 2024-03-22 RX ADMIN — METOCLOPRAMIDE 10 MG: 5 INJECTION, SOLUTION INTRAMUSCULAR; INTRAVENOUS at 13:32

## 2024-03-22 RX ADMIN — ONDANSETRON 4 MG: 2 INJECTION, SOLUTION INTRAMUSCULAR; INTRAVENOUS at 11:24

## 2024-03-22 RX ADMIN — PROPOFOL 200 MG: 10 INJECTION, EMULSION INTRAVENOUS at 11:32

## 2024-03-22 RX ADMIN — LIDOCAINE HYDROCHLORIDE 40 MG: 20 INJECTION, SOLUTION INFILTRATION; PERINEURAL at 11:59

## 2024-03-22 RX ADMIN — CEFAZOLIN 2 G: 330 INJECTION, POWDER, FOR SOLUTION INTRAMUSCULAR; INTRAVENOUS at 11:35

## 2024-03-22 RX ADMIN — FENTANYL CITRATE 50 MCG: 50 INJECTION, SOLUTION INTRAMUSCULAR; INTRAVENOUS at 11:32

## 2024-03-22 RX ADMIN — Medication 100 MCG: at 12:42

## 2024-03-22 RX ADMIN — MIDAZOLAM 2 MG: 1 INJECTION INTRAMUSCULAR; INTRAVENOUS at 11:24

## 2024-03-22 RX ADMIN — FENTANYL CITRATE 25 MCG: 50 INJECTION, SOLUTION INTRAMUSCULAR; INTRAVENOUS at 11:59

## 2024-03-22 RX ADMIN — LIDOCAINE HYDROCHLORIDE 60 MG: 20 INJECTION, SOLUTION INFILTRATION; PERINEURAL at 11:32

## 2024-03-22 RX ADMIN — HYDROMORPHONE HYDROCHLORIDE 0.5 MG: 1 INJECTION, SOLUTION INTRAMUSCULAR; INTRAVENOUS; SUBCUTANEOUS at 12:30

## 2024-03-22 RX ADMIN — Medication 1.03 MILLICURIE: at 10:45

## 2024-03-22 RX ADMIN — SODIUM CHLORIDE, SODIUM LACTATE, POTASSIUM CHLORIDE, AND CALCIUM CHLORIDE: 600; 310; 30; 20 INJECTION, SOLUTION INTRAVENOUS at 11:22

## 2024-03-22 RX ADMIN — SCOLOPAMINE TRANSDERMAL SYSTEM 1 PATCH: 1 PATCH, EXTENDED RELEASE TRANSDERMAL at 11:35

## 2024-03-22 RX ADMIN — DEXAMETHASONE SODIUM PHOSPHATE 4 MG: 4 INJECTION INTRA-ARTICULAR; INTRALESIONAL; INTRAMUSCULAR; INTRAVENOUS; SOFT TISSUE at 11:36

## 2024-03-22 RX ADMIN — IBUPROFEN 600 MG: 600 TABLET, FILM COATED ORAL at 14:41

## 2024-03-22 SDOH — HEALTH STABILITY: MENTAL HEALTH: CURRENT SMOKER: 0

## 2024-03-22 ASSESSMENT — PAIN - FUNCTIONAL ASSESSMENT
PAIN_FUNCTIONAL_ASSESSMENT: 0-10

## 2024-03-22 ASSESSMENT — PAIN SCALES - GENERAL
PAINLEVEL_OUTOF10: 1
PAINLEVEL_OUTOF10: 3
PAIN_LEVEL: 0
PAINLEVEL_OUTOF10: 3
PAINLEVEL_OUTOF10: 4
PAINLEVEL_OUTOF10: 6
PAINLEVEL_OUTOF10: 4
PAINLEVEL_OUTOF10: 5 - MODERATE PAIN
PAINLEVEL_OUTOF10: 6
PAINLEVEL_OUTOF10: 2
PAINLEVEL_OUTOF10: 0 - NO PAIN

## 2024-03-22 ASSESSMENT — COLUMBIA-SUICIDE SEVERITY RATING SCALE - C-SSRS
2. HAVE YOU ACTUALLY HAD ANY THOUGHTS OF KILLING YOURSELF?: NO
6. HAVE YOU EVER DONE ANYTHING, STARTED TO DO ANYTHING, OR PREPARED TO DO ANYTHING TO END YOUR LIFE?: NO
1. IN THE PAST MONTH, HAVE YOU WISHED YOU WERE DEAD OR WISHED YOU COULD GO TO SLEEP AND NOT WAKE UP?: NO

## 2024-03-22 NOTE — OP NOTE
"Date: 3/22/2024  OR Location: [facility]    Name: Luz Rojo \"Alfonzo", : 1985, Age: 38 y.o., MRN: [id number], Sex: female    Diagnosis  Pre-op Diagnosis     * Malignant neoplasm of upper-outer quadrant of right breast in female, estrogen receptor positive (CMS/HCC) [C50.411, Z17.0] Post-op Diagnosis     * Malignant neoplasm of upper-outer quadrant of right breast in female, estrogen receptor positive (CMS/HCC) [C50.411, Z17.0]     Procedures  RIGHT MAG SEED LOCALIZED PARTIAL MASTECTOMY   LYMPHATIC MAPPING WITH DUAL TRACER  RIGHT AXILLARY MAG SEED LOCALIZED SENTINEL LYMPH NODE BIOPSY    Surgeons      * Liliana Barrett - Primary    Resident/Fellow/Other Assistant:  Surgeon(s) and Role:    Procedure Summary  Anesthesia: General  ASA: ASA status not filed in the log.  Anesthesia Staff: Anesthesiologist: Aisha Caballero MD  C-AA: CECI De La Rosa  Estimated Blood Loss: 20mL    Staff:   Circulator: Vera Woodard RN; Iqra Christine RN  Scrub Person: Maggy Goodwin RN; Maggy Davalos    Details of Procedure:    The patient underwent pre-operative magnetic seed localization in the radiology department prior to surgery.  Localization studies were reviewed confirming appropriate localization.  In the pre-op bay the patient's right breast was marked and injected with technetium 99 in a periareolar 4 quadrant fashion. The patient was then transported to the operative suite. A sign-in was performed verifying patient identity, procedure and laterality.  One dose of preoperative antibiotics was given.  After induction of anesthesia, uptake into the corresponding axilla was confirmed using the handheld gamma probe.  3cc of isosulfan blue was injected in the subareolar space and massaged for several minutes. The right breast and axilla were prepped and draped in the usual sterile fashion.   Just prior to incision, a time-out was performed confirming patient identity, procedure and laterality.  Attention was " first turned toward the axilla. The handheld gamma probe was used to identify the area of greatest uptake in the axilla and the area was marked on the skin.  An incision was made in the axilla and carried down through the subcutaneous tissue and clavipectoral fascia to gain entry into the axilla.  The handheld sentimag was used to identify th previously biopsied and positive lymph node.  This node was not hot nor blue.  The palpable node corresponds with the second node seen on CT.  The node was dissected free with Bovie and passed off to pathology. The handheld gamma probe was used to identify a hot node which was excised in its entirety. This node was also palpably abnormal and was hot.  The axilla was palpated there was not any additional palpably abnormal or blue lymph nodes.  Frozen section was positive for lm metastasis in 2 of 4 nodes. The clavipectoral fascia was re-approximated with 2-0 Vicryl.  The dermis was closed with 3-0 Vicryl and the skin was close with a running 4-0 Monocryl. Attention was then turned to the breast. The sentimag probe was used to identify the location of the targeted lesion and marked on the skin.  Thr right axillary incision was utilized and flaps were raised in the direction of the targeted lesion.  The target lesion was then circumferentially dissected using electrocautery.  Circumferential dissection was carried out to include the targeted lesion and localization seed.  The probe was used to again confirm the presence of the localization seed within the specimen. Once the specimen was completed dissected it was oriented with a silk suture- short suture superior, long suture lateral and passed off the field.   The Vector ink kit was used to ink the specimen: red = superior, blue = inferior, yellow = medial, orange = lateral, green = anterior, and black = posterior. A specimen radiograph was performed which confirmed the presence of the lesion, biopsy clip, and localization  seed.  Five additional cavity margins were taken: superior, inferior, medial, lateral, and anterior with black ink denoting the new margin.  The posterior extent of dissection did  include pectoralis fascia on the index partial mastectomy specimen. Clips were placed to denote the area of resection. Next, meticulous hemostasis was achieved.  Upon inspect of the axillary and lumpectomy cavities I did appreciate an additional normal appearing lymph node.  This node was also sent separately to pathology for permanent sectioning.  The dermis was closed with 3-0 Vicryl suture and the skin was closed with a running 4-0 Monocryl.  Benzoin and steri-strips were used as dressing.  The patient was then awoken from anesthesia and transported to the PACU in satisfactory condition.    [place] Node Biopsy for Breast Cancer  Operation performed with curative intent Yes   Tracer(s) used to identify sentinel nodes in the upfront surgery (non-neoadjuvant) setting Dye and Radioactive tracer   Tracer(s) used to identify sentinel nodes in the neoadjuvant setting N/A   All nodes (colored or non-colored) present at the end of a dye-filled lymphatic channel were removed Yes   All significantly radioactive nodes were removed Yes   All palpably suspicious nodes were removed Yes   Biopsy-proven positive nodes marked with clips prior to chemotherapy were identified and removed Yes       SPECIMENS:    1. Right axillary sentinel nodes with magseed  2. Right magseed localized partial mastectomy: short suture superior, long lateral  3. New superior margin: black ink at new margin  4. New inferior margin:  black ink at new margin  5. New medial margin: black ink at new margin  6. New lateral margin: black ink at new margin  7. New anterior margin: black ink at new margin  8. Additional right axillary lymph node.      Liliana Barrett DO

## 2024-03-22 NOTE — ANESTHESIA PROCEDURE NOTES
Airway  Date/Time: 3/22/2024 11:32 AM  Urgency: elective    Airway not difficult    Staffing  Performed: CAA and SOLITARIO   Authorized by: Aisha Caballero MD    Performed by: CECI De La Rosa  Patient location during procedure: OR    Indications and Patient Condition  Indications for airway management: anesthesia  Spontaneous Ventilation: absent  Sedation level: deep  Preoxygenated: yes  Patient position: sniffing  MILS maintained throughout  Mask difficulty assessment: 1 - vent by mask  Planned trial extubation    Final Airway Details  Final airway type: supraglottic airway      Successful airway: unique  Size 4

## 2024-03-22 NOTE — ANESTHESIA POSTPROCEDURE EVALUATION
"Patient: Luz Rojo \"Alice\"    Procedure Summary       Date: 03/22/24 Room / Location: [facility] OR 02 / Virtual [facility]    Anesthesia Start: 1120 Anesthesia Stop:     Procedure: RIGHT MAG SEED LOCALIZED PARTIAL MASTECTOMY WITH MAG SEED LOCALIZED SENTINEL LYMPH NODE BIOPSY, POSSIBLE AXILLARY NODE DISSECTION (Right: Breast) Diagnosis:       Malignant neoplasm of upper-outer quadrant of right breast in female, estrogen receptor positive (CMS/HCC)      (Malignant neoplasm of upper-outer quadrant of right breast in female, estrogen receptor positive (CMS/HCC) [C50.411, Z17.0])    Surgeons: Liliana Barrett DO Responsible Provider: Aisha Caballero MD    Anesthesia Type: general ASA Status: 2            Anesthesia Type: general    Vitals Value Taken Time   /110 03/22/24 1327   Temp 36 03/22/24 1329   Pulse 65 03/22/24 1329   Resp 12 03/22/24 1329   SpO2 95 % 03/22/24 1328   Vitals shown include unvalidated device data.    Anesthesia Post Evaluation    Patient location during evaluation: PACU  Patient participation: complete - patient participated  Level of consciousness: awake and alert  Pain score: 0  Pain management: adequate  Airway patency: patent  Cardiovascular status: acceptable  Respiratory status: acceptable  Hydration status: balanced  Postoperative Nausea and Vomiting: moderate  Comments: Required Phenergan administration        No notable events documented.    "

## 2024-03-22 NOTE — ANESTHESIA PREPROCEDURE EVALUATION
"Patient: Luz Rojo \"Alice\"    Procedure Information       Anesthesia Start Date/Time: 24 1120    Procedure: RIGHT MAG SEED LOCALIZED PARTIAL MASTECTOMY WITH MAG SEED LOCALIZED SENTINEL LYMPH NODE BIOPSY, POSSIBLE AXILLARY NODE DISSECTION (Right: Breast)    Location: [facility] / [facility]    Surgeons: Liliana Barrett, DO            Relevant Problems   Cardiovascular   (+) Hyperlipidemia      Endocrine   (+) Hypothyroidism      Neuro/Psych   (+) Anxiety and depression   (+) Depression, major, single episode, mild (CMS/HCC)      Other   (+) Axillary lymphadenopathy       Clinical information reviewed:   Tobacco  Allergies  Meds  Problems  Med Hx  Surg Hx  OB Status    Fam Hx  Soc Hx        NPO Detail:  NPO/Void Status  Carbohydrate Drink Given Prior to Surgery? : N  Date of Last Liquid: 24  Time of Last Liquid: 600  Date of Last Solid: 24  Time of Last Solid:   Last Intake Type: Clear fluids  Time of Last Void: 0800         Physical Exam    Airway  Mallampati: III  TM distance: >3 FB  Neck ROM: full  Comments: Narrow palate, big tongue   Cardiovascular - normal exam     Dental - normal exam     Pulmonary - normal exam     Abdominal - normal exam           Vitals:    24 0940   BP: (!) 166/104   Pulse: 102   Resp: 20   Temp: 36.4 °C (97.5 °F)   SpO2: 95%       Past Surgical History:   Procedure Laterality Date     SECTION, LOW TRANSVERSE  2018     Section Low Transverse x2     Past Medical History:   Diagnosis Date    Anxiety     Breast cancer (CMS/HCC)     Candidiasis, unspecified     Yeast infection    Depression     Encounter for routine postpartum follow-up 2018    Routine postpartum follow-up    Hyperlipidemia     Hypothyroidism     Maternal care for unspecified type scar from previous  delivery 2018    Previous  delivery affecting pregnancy    Nonpurulent mastitis associated with the puerperium 2018    " Mastitis, postpartum    Personal history of other diseases of the respiratory system 05/29/2019    History of sore throat    Personal history of other mental and behavioral disorders     History of depression    PONV (postoperative nausea and vomiting)     Streptococcal pharyngitis 05/29/2019    Pharyngitis due to group A beta hemolytic Streptococci    Vitamin D deficiency        Current Facility-Administered Medications:     BUPivacaine HCl (Marcaine) 0.25 % (2.5 mg/mL) injection, , , PRN, Liliana Barrett DO, 30 mL at 03/22/24 1244    ceFAZolin in dextrose (iso-os) (Ancef) IVPB 2 g, 2 g, intravenous, Once, Liliana Barrett, DO    isosulfan blue (Lumphazurin) 1 % injection, , , PRN, Liliana Barrett DO, 3 mL at 03/22/24 1140    Facility-Administered Medications Ordered in Other Encounters:     ceFAZolin (Ancef) injection, , intravenous, PRN, Michell Yerukhim, CAA, 2 g at 03/22/24 1135    dexAMETHasone (Decadron) injection, , intravenous, PRN, Michell Yerukhim, CAA, 4 mg at 03/22/24 1136    fentaNYL PF (Sublimaze) injection, , intravenous, PRN, Michell Yerukhim, CAA, 25 mcg at 03/22/24 1159    HYDROmorphone (Dilaudid) injection, , intravenous, PRN, Michell Yerukhim, CAA, 0.5 mg at 03/22/24 1230    lactated Ringer's bolus, , intravenous, Continuous PRN, Michell Yerukhim, CAA, New Bag at 03/22/24 1122    lidocaine (Xylocaine) 20 mg/mL (2 %) injection, , miscellaneous, PRN, Michell Yerukhim, CAA, 40 mg at 03/22/24 1159    midazolam (Versed) injection, , intravenous, PRN, Michell Yerukhim, CAA, 2 mg at 03/22/24 1124    ondansetron (Zofran) injection, , intravenous, PRN, Michell Yerukhim, CAA, 4 mg at 03/22/24 1124    phenylephrine in NS (Anthony-Synephrine) 100 mcg/mL syringe, , intravenous, PRN, Michell Yerukhim, CAA, 100 mcg at 03/22/24 1242    propofol (Diprivan) injection, , intravenous, PRN, Michell Jacobson, CAA, 200 mg at 03/22/24 1132  Prior to Admission medications    Medication Sig Start Date  End Date Taking? Authorizing Provider   cholecalciferol (Vitamin D3) 5,000 Units tablet Take 1 tablet (5,000 Units) by mouth once daily.   Yes Historical Provider, MD   cyanocobalamin, vitamin B-12, (VITAMIN B-12 ORAL) Take 1 capsule by mouth once daily.   Yes Historical Provider, MD   levothyroxine (Synthroid, Levoxyl) 88 mcg tablet TAKE 1 TABLET BY MOUTH EVERY DAY 3/19/24  Yes Richa Naranjo MD   omega-3 acid ethyl esters (Lovaza) 1 gram capsule TAKE 1 CAPSULE (1 G) BY MOUTH ONCE DAILY. 10/31/23  Yes Richa Naranjo MD   sertraline (Zoloft) 50 mg tablet Take 1 tablet (50 mg) by mouth once daily.   Yes Historical Provider, MD   busPIRone (Buspar) 5 mg tablet Take 1 tablet (5 mg) by mouth 3 times a day. 3/13/24 3/13/25  Richa Naranjo MD   HYDROcodone-acetaminophen (Norco) 5-325 mg tablet Take 1 tablet by mouth every 6 hours if needed for severe pain (7 - 10) for up to 7 days. 3/22/24 3/29/24  Liliana Barrett,    hydrOXYzine HCL (Atarax) 10 mg tablet Take 1 tablet (10 mg) by mouth every 8 hours if needed for itching. 2/1/24 3/2/24  Richa Naranjo MD   levothyroxine (Synthroid, Levoxyl) 88 mcg tablet TAKE 1 TABLET BY MOUTH EVERY DAY 8/18/23 3/19/24  Richa Naranjo MD     No Known Allergies  Social History     Tobacco Use    Smoking status: Never    Smokeless tobacco: Never   Substance Use Topics    Alcohol use: Yes     Comment: occasional         Chemistry    Lab Results   Component Value Date/Time     03/06/2024 0843    K 4.3 03/06/2024 0843     03/06/2024 0843    CO2 28 03/06/2024 0843    BUN 14 03/06/2024 0843    CREATININE 0.63 03/06/2024 0843    Lab Results   Component Value Date/Time    CALCIUM 9.4 03/06/2024 0843    ALKPHOS 52 01/05/2024 0755    AST 18 01/05/2024 0755    ALT 16 01/05/2024 0755    BILITOT 0.5 01/05/2024 0755          Lab Results   Component Value Date/Time    WBC 7.6 03/06/2024 0843    HGB 12.4 03/06/2024 0843    HCT 36.1 03/06/2024 0843     03/06/2024  "0843     No results found for: \"PROTIME\", \"PTT\", \"INR\"  No results found for this or any previous visit (from the past 4464 hour(s)).   Anesthesia Plan    History of general anesthesia?: yes  History of complications of general anesthesia?: no    ASA 2     general     The patient is not a current smoker.    intravenous induction   Anesthetic plan and risks discussed with patient.    Plan discussed with CAA.      "

## 2024-03-22 NOTE — DISCHARGE INSTRUCTIONS
DR. NOBLES'S BREAST SURGERY DISCHARGE INSTRUCTIONS    Wound Care    Do not remove the surgical bra for 24 hours.  Wear the bra to bed to reduce movement.  Your incisions are covered with steri strips.  Leave these in place until they begin to lift from the edges.  If steri strips are still in place after 10 days you may remove the strips.  You can place ice on your breast as needed for pain relief  (20 minutes on / 20 minutes off).    Activity    You may shower after 24 hours, but no baths, tubs, or swimming for 2 weeks.  Do not lift anything more than 10lbs or do any strenuous activity until seen in post op.  You may drive when you are not taking narcotic pain medication.    Medication    Take prescription  narcotic medication for severe pain.  Do not drive while taking narcotics.  You may take acetaminophen (Tylenol), ibuprofen (Motrin), or naproxen (Aleve) for pain.    When to Call    Your wound is red, swollen, or has a lot of bleeding or drainage.  Your pain is not controlled by the medicines.  You have a fever or 100F or greater.    Follow Up    Dr. Nobles will review your pathology results at your post-op visit.  Please call the office at [phone] to make a follow up appointment.

## 2024-03-25 DIAGNOSIS — R11.2 NAUSEA AND VOMITING, UNSPECIFIED VOMITING TYPE: Primary | ICD-10-CM

## 2024-03-25 RX ORDER — ONDANSETRON 4 MG/1
4 TABLET, ORALLY DISINTEGRATING ORAL EVERY 8 HOURS PRN
Qty: 20 TABLET | Refills: 0 | Status: SHIPPED | OUTPATIENT
Start: 2024-03-25 | End: 2024-04-01

## 2024-03-26 ENCOUNTER — PATIENT MESSAGE (OUTPATIENT)
Dept: SURGICAL ONCOLOGY | Facility: CLINIC | Age: 39
End: 2024-03-26
Payer: COMMERCIAL

## 2024-03-26 NOTE — TELEPHONE ENCOUNTER
From: Luz Rojo  To: Liliana Barrett DO  Sent: 3/26/2024 9:34 AM EDT  Subject: Post surgery question     Hello again :)  I was reading the notes from surgery, and I just have one question regarding this part of the notes     “The handheld sentimag was used to identify the previously biopsied and positive lymph node. This node was not hot nor blue. The palpable node corresponds with the second node seen on CT.”    Is it unusual that a positive node wouldn’t take the blue dye? As always, thank you so much! Side note - I’m in awe of how well I’m healing and how normal my breast looks. Thank you. Thank you!

## 2024-04-02 DIAGNOSIS — Z17.0 MALIGNANT NEOPLASM OF UPPER-OUTER QUADRANT OF RIGHT BREAST IN FEMALE, ESTROGEN RECEPTOR POSITIVE (MULTI): Primary | ICD-10-CM

## 2024-04-02 DIAGNOSIS — C50.411 MALIGNANT NEOPLASM OF UPPER-OUTER QUADRANT OF RIGHT BREAST IN FEMALE, ESTROGEN RECEPTOR POSITIVE (MULTI): Primary | ICD-10-CM

## 2024-04-04 ENCOUNTER — TUMOR BOARD CONFERENCE (OUTPATIENT)
Dept: HEMATOLOGY/ONCOLOGY | Facility: HOSPITAL | Age: 39
End: 2024-04-04
Payer: COMMERCIAL

## 2024-04-04 ENCOUNTER — PREP FOR PROCEDURE (OUTPATIENT)
Dept: SURGICAL ONCOLOGY | Facility: HOSPITAL | Age: 39
End: 2024-04-04

## 2024-04-04 ENCOUNTER — PATIENT MESSAGE (OUTPATIENT)
Dept: SURGICAL ONCOLOGY | Facility: HOSPITAL | Age: 39
End: 2024-04-04

## 2024-04-08 LAB
LAB AP ASR DISCLAIMER: NORMAL
LAB AP BLOCK FOR ADDITIONAL STUDIES: NORMAL
LABORATORY COMMENT REPORT: NORMAL
Lab: NORMAL
PATH REPORT.FINAL DX SPEC: NORMAL
PATH REPORT.GROSS SPEC: NORMAL
PATH REPORT.RELEVANT HX SPEC: NORMAL
PATH REPORT.TOTAL CANCER: NORMAL
PATHOLOGY SYNOPTIC REPORT: NORMAL

## 2024-04-09 ENCOUNTER — PATIENT MESSAGE (OUTPATIENT)
Dept: SURGICAL ONCOLOGY | Facility: CLINIC | Age: 39
End: 2024-04-09

## 2024-04-09 ENCOUNTER — OFFICE VISIT (OUTPATIENT)
Dept: SURGICAL ONCOLOGY | Facility: CLINIC | Age: 39
End: 2024-04-09
Payer: COMMERCIAL

## 2024-04-09 DIAGNOSIS — C50.411 MALIGNANT NEOPLASM OF UPPER-OUTER QUADRANT OF RIGHT BREAST IN FEMALE, ESTROGEN RECEPTOR POSITIVE (MULTI): ICD-10-CM

## 2024-04-09 DIAGNOSIS — Z17.0 MALIGNANT NEOPLASM OF UPPER-OUTER QUADRANT OF RIGHT BREAST IN FEMALE, ESTROGEN RECEPTOR POSITIVE (MULTI): ICD-10-CM

## 2024-04-09 PROCEDURE — 99024 POSTOP FOLLOW-UP VISIT: CPT | Performed by: SURGERY

## 2024-04-09 NOTE — PROGRESS NOTES
" BREAST SURGERY POST OPERATIVE VISIT    Assessment/Plan     Right breast IDC g2 ER 95% MO 95% HER2- measuring 2cm on final pathology   [id number] IB    We reviewed the pathology results from surgery.  The cancer has been excised to negative margins with negative lymph nodes.  Her surgical treatment is complete.    I will refer to med/onc and rad/onc for recommendations for adjuvant treatment.    I will follow up at the time of next mammogram for imaging and clinical exam or sooner for any breast concerns.    Subjective   Luz Rojo \"Alice\" is a 38 y.o. female here for post op visit s/p right MS PM MS SLNB.    Date of surgery: 3/22/2024  Pathology   Tumor size: 2cm   Lymph nodes: 2/6   Margins: negative   Staging: [id number]     Objective   Physical Exam  General: Alert and oriented x 3.  Mood and affect are appropriate.  HEENT: EOMI, PERRLA.   Neck: supple, no masses, no cervical adenopathy.  Cardiovascular: no lower extremity edema.  Pulmonary: breathing non labored on room air.  GI: Abdomen soft, no masses. No hepatomegaly or splenomegaly.  Lymph nodes: No supraclavicular or axillary adenopathy bilaterally.  Musculoskeletal: Full range of motion in the upper extremities bilaterally.  Neuro: denies dizziness, tremors    Breasts: The breasts were examined in both the seated and supine positions.    RIGHT: The nipple is everted without nipple discharge.  There are no skin changes, skin thickening, or dimpling. There are no masses palpated in the RIGHT breast. Right axillary incision healing well.  LEFT: The nipple is everted without nipple discharge.  There are no skin changes, skin thickening, or dimpling. There are no masses palpated in the LEFT breast.     Radiology review: All images and reports were personally reviewed.         Liliana Barrett, DO     "

## 2024-04-10 ENCOUNTER — OFFICE VISIT (OUTPATIENT)
Dept: HEMATOLOGY/ONCOLOGY | Facility: CLINIC | Age: 39
End: 2024-04-10
Payer: COMMERCIAL

## 2024-04-10 ENCOUNTER — LAB (OUTPATIENT)
Dept: LAB | Facility: CLINIC | Age: 39
End: 2024-04-10
Payer: COMMERCIAL

## 2024-04-10 ENCOUNTER — PATIENT MESSAGE (OUTPATIENT)
Dept: SURGICAL ONCOLOGY | Facility: CLINIC | Age: 39
End: 2024-04-10
Payer: COMMERCIAL

## 2024-04-10 VITALS
DIASTOLIC BLOOD PRESSURE: 91 MMHG | SYSTOLIC BLOOD PRESSURE: 163 MMHG | RESPIRATION RATE: 18 BRPM | HEART RATE: 113 BPM | WEIGHT: 216.9 LBS | HEIGHT: 67 IN | OXYGEN SATURATION: 99 % | TEMPERATURE: 97.7 F | BODY MASS INDEX: 34.04 KG/M2

## 2024-04-10 DIAGNOSIS — Z17.0 MALIGNANT NEOPLASM OF UPPER-OUTER QUADRANT OF RIGHT BREAST IN FEMALE, ESTROGEN RECEPTOR POSITIVE (MULTI): ICD-10-CM

## 2024-04-10 DIAGNOSIS — C50.411 MALIGNANT NEOPLASM OF UPPER-OUTER QUADRANT OF RIGHT BREAST IN FEMALE, ESTROGEN RECEPTOR POSITIVE (MULTI): Primary | ICD-10-CM

## 2024-04-10 DIAGNOSIS — C50.411 MALIGNANT NEOPLASM OF UPPER-OUTER QUADRANT OF RIGHT BREAST IN FEMALE, ESTROGEN RECEPTOR POSITIVE (MULTI): ICD-10-CM

## 2024-04-10 DIAGNOSIS — Z17.0 MALIGNANT NEOPLASM OF UPPER-OUTER QUADRANT OF RIGHT BREAST IN FEMALE, ESTROGEN RECEPTOR POSITIVE (MULTI): Primary | ICD-10-CM

## 2024-04-10 LAB
ALBUMIN SERPL BCP-MCNC: 4.4 G/DL (ref 3.4–5)
ALP SERPL-CCNC: 64 U/L (ref 33–110)
ALT SERPL W P-5'-P-CCNC: 26 U/L (ref 7–45)
ANION GAP SERPL CALC-SCNC: 12 MMOL/L (ref 10–20)
AST SERPL W P-5'-P-CCNC: 18 U/L (ref 9–39)
B-HCG SERPL-ACNC: <2 MIU/ML
BASOPHILS # BLD AUTO: 0.02 X10*3/UL (ref 0–0.1)
BASOPHILS NFR BLD AUTO: 0.2 %
BILIRUB SERPL-MCNC: 0.4 MG/DL (ref 0–1.2)
BUN SERPL-MCNC: 17 MG/DL (ref 6–23)
CALCIUM SERPL-MCNC: 9.2 MG/DL (ref 8.6–10.3)
CHLORIDE SERPL-SCNC: 101 MMOL/L (ref 98–107)
CO2 SERPL-SCNC: 26 MMOL/L (ref 21–32)
CREAT SERPL-MCNC: 0.73 MG/DL (ref 0.5–1.05)
EGFRCR SERPLBLD CKD-EPI 2021: >90 ML/MIN/1.73M*2
EOSINOPHIL # BLD AUTO: 0.04 X10*3/UL (ref 0–0.7)
EOSINOPHIL NFR BLD AUTO: 0.4 %
ERYTHROCYTE [DISTWIDTH] IN BLOOD BY AUTOMATED COUNT: 12.5 % (ref 11.5–14.5)
GLUCOSE SERPL-MCNC: 88 MG/DL (ref 74–99)
HBV CORE AB SER QL: NONREACTIVE
HBV SURFACE AB SER-ACNC: <3.1 MIU/ML
HBV SURFACE AG SERPL QL IA: NONREACTIVE
HCT VFR BLD AUTO: 40.8 % (ref 36–46)
HGB BLD-MCNC: 13.5 G/DL (ref 12–16)
IMM GRANULOCYTES # BLD AUTO: 0.01 X10*3/UL (ref 0–0.7)
IMM GRANULOCYTES NFR BLD AUTO: 0.1 % (ref 0–0.9)
LYMPHOCYTES # BLD AUTO: 2.33 X10*3/UL (ref 1.2–4.8)
LYMPHOCYTES NFR BLD AUTO: 22.9 %
MCH RBC QN AUTO: 29.2 PG (ref 26–34)
MCHC RBC AUTO-ENTMCNC: 33.1 G/DL (ref 32–36)
MCV RBC AUTO: 88 FL (ref 80–100)
MONOCYTES # BLD AUTO: 0.57 X10*3/UL (ref 0.1–1)
MONOCYTES NFR BLD AUTO: 5.6 %
NEUTROPHILS # BLD AUTO: 7.21 X10*3/UL (ref 1.2–7.7)
NEUTROPHILS NFR BLD AUTO: 70.8 %
PLATELET # BLD AUTO: 412 X10*3/UL (ref 150–450)
POTASSIUM SERPL-SCNC: 3.7 MMOL/L (ref 3.5–5.3)
PROT SERPL-MCNC: 8.5 G/DL (ref 6.4–8.2)
RBC # BLD AUTO: 4.62 X10*6/UL (ref 4–5.2)
SODIUM SERPL-SCNC: 135 MMOL/L (ref 136–145)
WBC # BLD AUTO: 10.2 X10*3/UL (ref 4.4–11.3)

## 2024-04-10 PROCEDURE — 84702 CHORIONIC GONADOTROPIN TEST: CPT

## 2024-04-10 PROCEDURE — 99215 OFFICE O/P EST HI 40 MIN: CPT | Performed by: INTERNAL MEDICINE

## 2024-04-10 PROCEDURE — 87340 HEPATITIS B SURFACE AG IA: CPT

## 2024-04-10 PROCEDURE — G2211 COMPLEX E/M VISIT ADD ON: HCPCS | Performed by: INTERNAL MEDICINE

## 2024-04-10 PROCEDURE — 36415 COLL VENOUS BLD VENIPUNCTURE: CPT

## 2024-04-10 PROCEDURE — 85025 COMPLETE CBC W/AUTO DIFF WBC: CPT

## 2024-04-10 PROCEDURE — 86704 HEP B CORE ANTIBODY TOTAL: CPT

## 2024-04-10 PROCEDURE — 84075 ASSAY ALKALINE PHOSPHATASE: CPT

## 2024-04-10 PROCEDURE — 86706 HEP B SURFACE ANTIBODY: CPT

## 2024-04-10 PROCEDURE — 99205 OFFICE O/P NEW HI 60 MIN: CPT | Performed by: INTERNAL MEDICINE

## 2024-04-10 RX ORDER — DEXAMETHASONE 4 MG/1
8 TABLET ORAL 2 TIMES DAILY
Qty: 60 TABLET | Refills: 2 | Status: SHIPPED | OUTPATIENT
Start: 2024-04-10

## 2024-04-10 ASSESSMENT — PAIN SCALES - GENERAL: PAINLEVEL: 0-NO PAIN

## 2024-04-10 NOTE — PROGRESS NOTES
"Patient ID: Alice Rojo is a 38 y.o. female.  Referring Physician: Liliana Barrett DO  [id number] [facility], [address] [place] [address]  Primary Care Provider: Richa Naranjo MD     Oncology History   Malignant neoplasm of upper-outer quadrant of right breast in female, estrogen receptor positive (CMS/HCC)   2/6/2024 Initial Diagnosis    Malignant neoplasm of upper-outer quadrant of right breast in female, estrogen receptor positive (CMS/HCC)     2/6/2024 Cancer Staged    Staging form: Breast, AJCC 8th Edition, Clinical: Stage IB (cT1c, cN1, cM0, G2, ER+, VA+, HER2-) - Signed by Liliana Barrett DO on 2/6/2024 4/9/2024 Cancer Staged    Staging form: Breast, AJCC 8th Edition, Pathologic: Stage IB (pT1c, pN1(sn), cM0, G3, ER+, VA+, HER2-) - Signed by Liliana Barrett DO on 4/9/2024        Subjective    HPI  Ms. Luz Rojo \"Alfonzo" is a 37 y/o female presenting for initial consultation at the request of Dr. Liliana Barrett for breast cancer.     The patient was initially diagnosed after abnormal mammogram first noted 1/24/2024 at [facility]. Follow-up diagnostic imaging on 1/26/2024 reveals a spiculated mass at 10:30 10cmFN measuring 2cm on imaging.  There are also 2 abnormal axillary lymph nodes.  A ultrasound guided biopsy was performed of mass and node on 1/30/2024 and right breast mass path revealed IDC grade 2 and axillary node bx same day revealed metastatic breast carciboma. ER 60-70%, VA 80-90% , HER 2 IHC 1+. Staging scans with CT c/a/p and bone scan 2/6/24 were negative. She then underwent a right breast partial mastectomy on 3/22/24 and path revealed 20 mm focus of invasive cancer, G3, along with Grade 3 DCIS. Margins negative. 2 out of 6 lymph nodes positive. Final staging pT1c pN1a.    Patient's past medical history, surgical history, family history and social history reviewed.  Past Medical History:   Diagnosis Date    Anxiety     Breast cancer (CMS/HCC)     Candidiasis, " unspecified     Yeast infection    Depression     Encounter for routine postpartum follow-up 2018    Routine postpartum follow-up    Hyperlipidemia     Hypothyroidism     Maternal care for unspecified type scar from previous  delivery 2018    Previous  delivery affecting pregnancy    Nonpurulent mastitis associated with the puerperium 2018    Mastitis, postpartum    Personal history of other diseases of the respiratory system 2019    History of sore throat    Personal history of other mental and behavioral disorders     History of depression    PONV (postoperative nausea and vomiting)     Streptococcal pharyngitis 2019    Pharyngitis due to group A beta hemolytic Streptococci    Vitamin D deficiency      Past Surgical History:   Procedure Laterality Date     SECTION, LOW TRANSVERSE  2018     Section Low Transverse x2     Family History   Problem Relation Name Age of Onset    Breast cancer Paternal Grandmother  40 - 49      Social History     Socioeconomic History    Marital status:      Spouse name: Not on file    Number of children: Not on file    Years of education: Not on file    Highest education level: Not on file   Occupational History    Not on file   Tobacco Use    Smoking status: Never    Smokeless tobacco: Never   Vaping Use    Vaping status: Never Used   Substance and Sexual Activity    Alcohol use: Yes     Comment: occasional    Drug use: Not Currently    Sexual activity: Not on file   Other Topics Concern    Not on file   Social History Narrative    Not on file     Social Determinants of Health     Financial Resource Strain: Not on file   Food Insecurity: Not on file   Transportation Needs: Not on file   Physical Activity: Not on file   Stress: Not on file   Social Connections: Not on file   Intimate Partner Violence: Not on file   Housing Stability: Not on file        Objective   BSA: 2.16 meters squared  BP (!) 163/91 (BP  "Location: Right arm, Patient Position: Sitting, BP Cuff Size: Large adult)   Pulse (!) 113   Temp 36.5 °C (97.7 °F) (Temporal)   Resp 18   Ht 1.7 m (5' 6.93\")   Wt 98.4 kg (216 lb 14.4 oz)   SpO2 99%   BMI 34.04 kg/m²     Review of Systems:   Review of Systems:    Positive per HPI, otherwise negative.     Physical Exam  Constitutional:       General: She is not in acute distress.     Appearance: Normal appearance.   HENT:      Head: Normocephalic and atraumatic.      Nose: Nose normal.      Mouth/Throat:      Mouth: Mucous membranes are moist.      Pharynx: Oropharynx is clear. No oropharyngeal exudate or posterior oropharyngeal erythema.   Eyes:      General: No scleral icterus.     Extraocular Movements: Extraocular movements intact.      Conjunctiva/sclera: Conjunctivae normal.   Cardiovascular:      Rate and Rhythm: Normal rate and regular rhythm.   Pulmonary:      Effort: Pulmonary effort is normal. No respiratory distress.      Breath sounds: Normal breath sounds. No wheezing.   Abdominal:      General: There is no distension.      Palpations: Abdomen is soft.   Musculoskeletal:         General: No swelling, deformity or signs of injury. Normal range of motion.      Cervical back: Normal range of motion.   Skin:     General: Skin is warm and dry.      Coloration: Skin is not jaundiced.      Findings: No bruising, lesion or rash.   Neurological:      General: No focal deficit present.      Mental Status: She is alert and oriented to person, place, and time. Mental status is at baseline.   Right breast slightly larger than left with some erythema, well healed incision    Performance Status:  Symptomatic; fully ambulatory      Labs/Imaging/Pathology: personally reviewed reports and images in Epic electronic medical record system. Pertinent results as it related to the plan represented in below in assessment and plan.     Assessment/Plan    1.Right breast IDC, stage IB ypT1c (20mm)pN1 (2/6LNs) M0, ER " 60-70%, DE 80-90% , HER 2 IHC 1+  - initially diagnosed after abnormal mammogram first noted 1/24/2024 at [facility]  - Follow-up diagnostic imaging on 1/26/2024 reveals a spiculated mass at 10:30 10cmFN measuring 2cm on imaging.  There are also 2 abnormal axillary lymph nodes  - 1/30/2024 core bx of right breast mass path and LN revealed IDC grade 2 and axillary node bx same day revealed metastatic breast carciboma. ER 60-70%, DE 80-90% , HER 2 IHC 1+  - staging scans with CT c/a/p and bone scan 2/6/24 were negative.  - 3/22/24 right breast partial mastectomy path revealed 20 mm focus of invasive cancer, G3, along with Grade 3 DCIS. Margins negative. 2 out of 6 lymph nodes positive. Final staging pT1c pN1a.    - Today we discussed the role of adjuvant chemotherapy given higher clinical risk factors with size of tumor and node positive disease in setting of premenopausal state  - We did discuss there is a role for anthracycline-based therapy given her premenopausal status and node positive disease putting her at higher risk per the ABC trials (Rashawn, et al JCO 2017) we discussed with hormone positive being lower risk,  the benefit from anthracyclines compared to taxotere plus cytoxan is less clear and after reviewing side effects from both, patient is hesitant about the potential side effects from anthracycline especially as she has hx of hyperemesis along with concern about secondary leukemia  - we discussed Taxotere plus cytoxan is a very reasonable alternative  - We also reviewed that much of the benefit of chemotherapy comes from the ovarian suppression affect and she is open to ovarian suppression long-term.  - We will plan for Taxotere plus cytoxan  - Side effects were discussed and  consent was signed.  - Plan to start 4/22/24 and plan for baseline labs today.  - She is interested in Psychiatry referral and we will place the referral to [facility].  - She also would like to meet with the nutritionist while she's  here and she can see Khushbu with her first cycle.   - We discussed after chemotherapy she would proceed to radiation therapy, followed by ovarian suppression in addition to an aromatase inhibitor long term 5-10 years  - She is very concerned about hyperemesis as she had hyperemesis with her pregnancies. We will plan for Emend pre-med in addition to dexamethasone 3 days post and the night before.  - She is interested in a cooling cap and we will fit her today.   - Follow-up to clinic C1D8 with Ashok and cycle 2 with me.     RTC Cycle 1 Day 8 with Ashok and Cycle 2 with me. This note has been transcribed using a medical scribe and there is a possibility of unintentional typing misprints.    Diagnoses and all orders for this visit:  Malignant neoplasm of upper-outer quadrant of right breast in female, estrogen receptor positive (CMS/HCC)  -     Referral to Hematology and Oncology  -     CBC and Auto Differential; Future  -     Comprehensive Metabolic Panel; Future  -     Hepatitis B Core Antibody, Total; Future  -     Hepatitis B surface antibody; Future  -     Hepatitis B surface antigen; Future  -     Human Chorionic Gonadotropin, Serum Quantitative; Future  -     Referral to Onco-Psychology; Future  -     dexAMETHasone (Decadron) 4 mg tablet; Take 2 tablets (8 mg) by mouth 2 times a day. Take 2 tablets prior to treatment in the AM nothing the night of. Do not take same day as treatment. Take 2 tablets twice a day for 3 days following treatment  -     ondansetron (Zofran) 8 mg tablet; Take 1 tablet (8 mg) by mouth every 8 hours if needed for nausea or vomiting.  -     prochlorperazine (Compazine) 10 mg tablet; Take 1 tablet (10 mg) by mouth every 6 hours if needed for nausea or vomiting.  -     Infusion Appointment Request; Future  -     CBC and Auto Differential; Future  -     Comprehensive metabolic panel; Future  -     Clinic Appointment Request [facility]; Future  -     Infusion Appointment Request;  Future  -     Clinic Appointment Request; Future  -     Infusion Appointment Request; Future  -     CBC and Auto Differential; Future  -     Comprehensive metabolic panel; Future  -     Hcg, Quantitative, Pregnancy; Future  -     Clinic Appointment Request [facility]ANNON [facility]AFAR; Future  -     Infusion Appointment Request; Future  -     Clinic Appointment Request; Future  -     Infusion Appointment Request; Future  -     CBC and Auto Differential; Future  -     Comprehensive metabolic panel; Future  -     Hcg, Quantitative, Pregnancy; Future  -     Clinic Appointment Request [facility] ZAFAR; Future  -     Infusion Appointment Request; Future  Other orders  -     sodium chloride 0.9 % bolus 500 mL  -     fosaprepitant (Emend) 150 mg in sodium chloride 0.9% 250 mL IV  -     diphenhydrAMINE (BENADryl) injection 50 mg  -     palonosetron (Aloxi) injection 250 mcg  -     dexAMETHasone (Decadron) 16 mg in dextrose 5 % in water (D5W) 50 mL IV  -     prochlorperazine (Compazine) tablet 10 mg  -     prochlorperazine (Compazine) injection 10 mg  -     DOCEtaxeL (Taxotere) 160 mg in dextrose 5 % in water (D5W) 258 mL IV  -     cyclophosphamide (Cytoxan) 1,296 mg in sodium chloride 0.9% 314.8 mL IV  -     pegfilgrastim (Neulasta Onpro) injection 6 mg  -     sodium chloride 0.9 % bolus 500 mL  -     dextrose 5 % in water (D5W) bolus  -     diphenhydrAMINE (BENADryl) injection 50 mg  -     methylPREDNISolone sod succinate (SOLU-Medrol) 40 mg/mL injection 40 mg  -     famotidine PF (Pepcid) injection 20 mg  -     EPINEPHrine (Epipen) injection syringe 0.3 mg  -     albuterol 2.5 mg /3 mL (0.083 %) nebulizer solution 3 mL  -     heparin flush 10 unit/mL syringe 50 Units  -     heparin flush 100 unit/mL syringe 500 Units  -     alteplase (Cathflo Activase) injection 2 mg  -     sodium chloride 0.9 % bolus 500 mL  -     fosaprepitant (Emend) 150 mg in sodium chloride 0.9% 250 mL IV  -     diphenhydrAMINE (BENADryl) injection 50 mg  -      palonosetron (Aloxi) injection 250 mcg  -     dexAMETHasone (Decadron) 16 mg in dextrose 5 % in water (D5W) 50 mL IV  -     prochlorperazine (Compazine) tablet 10 mg  -     prochlorperazine (Compazine) injection 10 mg  -     DOCEtaxeL (Taxotere) 160 mg in dextrose 5 % in water (D5W) 258 mL IV  -     cyclophosphamide (Cytoxan) 1,296 mg in sodium chloride 0.9% 314.8 mL IV  -     pegfilgrastim (Neulasta Onpro) injection 6 mg  -     sodium chloride 0.9 % bolus 500 mL  -     dextrose 5 % in water (D5W) bolus  -     diphenhydrAMINE (BENADryl) injection 50 mg  -     methylPREDNISolone sod succinate (SOLU-Medrol) 40 mg/mL injection 40 mg  -     famotidine PF (Pepcid) injection 20 mg  -     EPINEPHrine (Epipen) injection syringe 0.3 mg  -     albuterol 2.5 mg /3 mL (0.083 %) nebulizer solution 3 mL  -     sodium chloride 0.9 % bolus 500 mL  -     fosaprepitant (Emend) 150 mg in sodium chloride 0.9% 250 mL IV  -     diphenhydrAMINE (BENADryl) injection 50 mg  -     palonosetron (Aloxi) injection 250 mcg  -     dexAMETHasone (Decadron) 16 mg in dextrose 5 % in water (D5W) 50 mL IV  -     prochlorperazine (Compazine) tablet 10 mg  -     prochlorperazine (Compazine) injection 10 mg  -     DOCEtaxeL (Taxotere) 160 mg in dextrose 5 % in water (D5W) 258 mL IV  -     cyclophosphamide (Cytoxan) 1,296 mg in sodium chloride 0.9% 314.8 mL IV  -     pegfilgrastim (Neulasta Onpro) injection 6 mg  -     sodium chloride 0.9 % bolus 500 mL  -     dextrose 5 % in water (D5W) bolus  -     diphenhydrAMINE (BENADryl) injection 50 mg  -     methylPREDNISolone sod succinate (SOLU-Medrol) 40 mg/mL injection 40 mg  -     famotidine PF (Pepcid) injection 20 mg  -     EPINEPHrine (Epipen) injection syringe 0.3 mg  -     albuterol 2.5 mg /3 mL (0.083 %) nebulizer solution 3 mL     Ban Gallegos MD  Hematology/Oncology  [facility]    Marie Attestation  By signing my name below, I, Marie Pleitez attest that this  documentation has been prepared under the direction and in the presence of Ban Gallegos MD.

## 2024-04-11 ENCOUNTER — PATIENT MESSAGE (OUTPATIENT)
Dept: SURGICAL ONCOLOGY | Facility: CLINIC | Age: 39
End: 2024-04-11
Payer: COMMERCIAL

## 2024-04-11 ENCOUNTER — TELEPHONE (OUTPATIENT)
Dept: HEMATOLOGY/ONCOLOGY | Facility: CLINIC | Age: 39
End: 2024-04-11
Payer: COMMERCIAL

## 2024-04-11 DIAGNOSIS — F41.9 ANXIETY: ICD-10-CM

## 2024-04-11 RX ORDER — DIPHENHYDRAMINE HYDROCHLORIDE 50 MG/ML
50 INJECTION INTRAMUSCULAR; INTRAVENOUS AS NEEDED
Status: CANCELLED | OUTPATIENT
Start: 2024-04-22

## 2024-04-11 RX ORDER — EPINEPHRINE 0.3 MG/.3ML
0.3 INJECTION SUBCUTANEOUS EVERY 5 MIN PRN
Status: CANCELLED | OUTPATIENT
Start: 2024-05-13

## 2024-04-11 RX ORDER — EPINEPHRINE 0.3 MG/.3ML
0.3 INJECTION SUBCUTANEOUS EVERY 5 MIN PRN
Status: CANCELLED | OUTPATIENT
Start: 2024-06-03

## 2024-04-11 RX ORDER — FAMOTIDINE 10 MG/ML
20 INJECTION INTRAVENOUS ONCE AS NEEDED
Status: CANCELLED | OUTPATIENT
Start: 2024-06-03

## 2024-04-11 RX ORDER — FAMOTIDINE 10 MG/ML
20 INJECTION INTRAVENOUS ONCE AS NEEDED
Status: CANCELLED | OUTPATIENT
Start: 2024-04-22

## 2024-04-11 RX ORDER — ALBUTEROL SULFATE 0.83 MG/ML
3 SOLUTION RESPIRATORY (INHALATION) AS NEEDED
Status: CANCELLED | OUTPATIENT
Start: 2024-05-13

## 2024-04-11 RX ORDER — HEPARIN 100 UNIT/ML
500 SYRINGE INTRAVENOUS AS NEEDED
Status: CANCELLED | OUTPATIENT
Start: 2024-04-11

## 2024-04-11 RX ORDER — HYDROXYZINE HYDROCHLORIDE 10 MG/1
10 TABLET, FILM COATED ORAL EVERY 8 HOURS PRN
Qty: 40 TABLET | Refills: 1 | Status: SHIPPED | OUTPATIENT
Start: 2024-04-11 | End: 2024-05-11

## 2024-04-11 RX ORDER — PALONOSETRON 0.05 MG/ML
0.25 INJECTION, SOLUTION INTRAVENOUS ONCE
Status: CANCELLED | OUTPATIENT
Start: 2024-06-03

## 2024-04-11 RX ORDER — ALBUTEROL SULFATE 0.83 MG/ML
3 SOLUTION RESPIRATORY (INHALATION) AS NEEDED
Status: CANCELLED | OUTPATIENT
Start: 2024-06-03

## 2024-04-11 RX ORDER — PROCHLORPERAZINE MALEATE 10 MG
10 TABLET ORAL EVERY 6 HOURS PRN
Status: CANCELLED | OUTPATIENT
Start: 2024-05-13

## 2024-04-11 RX ORDER — ONDANSETRON HYDROCHLORIDE 8 MG/1
8 TABLET, FILM COATED ORAL EVERY 8 HOURS PRN
Qty: 30 TABLET | Refills: 5 | Status: SHIPPED | OUTPATIENT
Start: 2024-04-11

## 2024-04-11 RX ORDER — EPINEPHRINE 0.3 MG/.3ML
0.3 INJECTION SUBCUTANEOUS EVERY 5 MIN PRN
Status: CANCELLED | OUTPATIENT
Start: 2024-04-22

## 2024-04-11 RX ORDER — PROCHLORPERAZINE MALEATE 10 MG
10 TABLET ORAL EVERY 6 HOURS PRN
Status: CANCELLED | OUTPATIENT
Start: 2024-04-22

## 2024-04-11 RX ORDER — ALBUTEROL SULFATE 0.83 MG/ML
3 SOLUTION RESPIRATORY (INHALATION) AS NEEDED
Status: CANCELLED | OUTPATIENT
Start: 2024-04-22

## 2024-04-11 RX ORDER — PROCHLORPERAZINE EDISYLATE 5 MG/ML
10 INJECTION INTRAMUSCULAR; INTRAVENOUS EVERY 6 HOURS PRN
Status: CANCELLED | OUTPATIENT
Start: 2024-04-22

## 2024-04-11 RX ORDER — PALONOSETRON 0.05 MG/ML
0.25 INJECTION, SOLUTION INTRAVENOUS ONCE
Status: CANCELLED | OUTPATIENT
Start: 2024-04-22

## 2024-04-11 RX ORDER — PROCHLORPERAZINE MALEATE 10 MG
10 TABLET ORAL EVERY 6 HOURS PRN
Qty: 30 TABLET | Refills: 5 | Status: SHIPPED | OUTPATIENT
Start: 2024-04-11

## 2024-04-11 RX ORDER — DIPHENHYDRAMINE HYDROCHLORIDE 50 MG/ML
50 INJECTION INTRAMUSCULAR; INTRAVENOUS ONCE
Status: CANCELLED | OUTPATIENT
Start: 2024-04-22

## 2024-04-11 RX ORDER — DIPHENHYDRAMINE HYDROCHLORIDE 50 MG/ML
50 INJECTION INTRAMUSCULAR; INTRAVENOUS AS NEEDED
Status: CANCELLED | OUTPATIENT
Start: 2024-05-13

## 2024-04-11 RX ORDER — DIPHENHYDRAMINE HYDROCHLORIDE 50 MG/ML
50 INJECTION INTRAMUSCULAR; INTRAVENOUS ONCE
Status: CANCELLED | OUTPATIENT
Start: 2024-06-03

## 2024-04-11 RX ORDER — DIPHENHYDRAMINE HYDROCHLORIDE 50 MG/ML
50 INJECTION INTRAMUSCULAR; INTRAVENOUS ONCE
Status: CANCELLED | OUTPATIENT
Start: 2024-05-13

## 2024-04-11 RX ORDER — PROCHLORPERAZINE EDISYLATE 5 MG/ML
10 INJECTION INTRAMUSCULAR; INTRAVENOUS EVERY 6 HOURS PRN
Status: CANCELLED | OUTPATIENT
Start: 2024-05-13

## 2024-04-11 RX ORDER — HEPARIN SODIUM,PORCINE/PF 10 UNIT/ML
50 SYRINGE (ML) INTRAVENOUS AS NEEDED
Status: CANCELLED | OUTPATIENT
Start: 2024-04-11

## 2024-04-11 RX ORDER — PALONOSETRON 0.05 MG/ML
0.25 INJECTION, SOLUTION INTRAVENOUS ONCE
Status: CANCELLED | OUTPATIENT
Start: 2024-05-13

## 2024-04-11 RX ORDER — DIPHENHYDRAMINE HYDROCHLORIDE 50 MG/ML
50 INJECTION INTRAMUSCULAR; INTRAVENOUS AS NEEDED
Status: CANCELLED | OUTPATIENT
Start: 2024-06-03

## 2024-04-11 RX ORDER — PROCHLORPERAZINE EDISYLATE 5 MG/ML
10 INJECTION INTRAMUSCULAR; INTRAVENOUS EVERY 6 HOURS PRN
Status: CANCELLED | OUTPATIENT
Start: 2024-06-03

## 2024-04-11 RX ORDER — PROCHLORPERAZINE MALEATE 10 MG
10 TABLET ORAL EVERY 6 HOURS PRN
Status: CANCELLED | OUTPATIENT
Start: 2024-06-03

## 2024-04-11 RX ORDER — FAMOTIDINE 10 MG/ML
20 INJECTION INTRAVENOUS ONCE AS NEEDED
Status: CANCELLED | OUTPATIENT
Start: 2024-05-13

## 2024-04-11 NOTE — TELEPHONE ENCOUNTER
"I spoke with Alice and all questions were answered regarding reoccurrence rate. I did let her know Per Dr. Gallegos \" Reoccurrence rate 5-10%.\" All questions were answered and she was appreciative   "

## 2024-04-11 NOTE — TELEPHONE ENCOUNTER
I spoke with Alice about her schedule. I did let her know that Dr. Gallegos will work on putting orders in and our PAS will call her with dates and time. I did let her know that she can get treatment at [facility] but Dr. Gallegos does not see patients there. I offered that she can get treatment at [facility] if there is no provider visit attached. All questions were answered regarding treatment, provider appointments and both sites. At this time Alice will have all appointments including infusions at [facility]. Questions were answered regarding tumor board. She was very appreciative and denied further questions.

## 2024-04-11 NOTE — TELEPHONE ENCOUNTER
From: Luz Rojo  To: Liliana Barrett  Sent: 4/11/2024 10:22 AM EDT  Subject: Question     Hi, can you explain this order? Will it change anything? I met with dr Gallegos yesterday and we have a plan in place. Are all cases sent to tumor board? It makes me anxious I’m a “complicated” case.

## 2024-04-11 NOTE — TELEPHONE ENCOUNTER
VM  Hasn't heard about schedule for chemo yet.  Wants to go to [place].  Couldn't message Dr. Gallegos about this in her MyChart- doesn't even show she is her doctor.  Patient anxious to have done- please call her.

## 2024-04-15 ENCOUNTER — PATIENT MESSAGE (OUTPATIENT)
Dept: SURGICAL ONCOLOGY | Facility: CLINIC | Age: 39
End: 2024-04-15
Payer: COMMERCIAL

## 2024-04-15 DIAGNOSIS — C50.411 MALIGNANT NEOPLASM OF UPPER-OUTER QUADRANT OF RIGHT BREAST IN FEMALE, ESTROGEN RECEPTOR POSITIVE (MULTI): Primary | ICD-10-CM

## 2024-04-15 DIAGNOSIS — Z17.0 MALIGNANT NEOPLASM OF UPPER-OUTER QUADRANT OF RIGHT BREAST IN FEMALE, ESTROGEN RECEPTOR POSITIVE (MULTI): Primary | ICD-10-CM

## 2024-04-15 NOTE — TELEPHONE ENCOUNTER
From: Luz Rojo  To: Liliana Barrett  Sent: 4/15/2024 1:50 PM EDT  Subject: Insurance     Hi Dipti,  Our insurance is saying Dr. Gómez Richter isn’t in our network. I have an appointment with him May 7 but I’ll have to switch. It looks like Dr Jevon Ayala is in network. Can you put in a referral for him?     Our instance also said Dr. Barrett was no longer in network after Jan 1 2024. We are disputing it bc she was and still is on the website still as a provider in our network. Hopefully they side with us in the dispute. If not, they said we could get something in writing from your office. Hopefully it doesn’t get to that..but just wanted to give you a heads up incase. Thanks so much!

## 2024-04-18 ENCOUNTER — SOCIAL WORK (OUTPATIENT)
Dept: HEMATOLOGY/ONCOLOGY | Facility: CLINIC | Age: 39
End: 2024-04-18

## 2024-04-18 ENCOUNTER — TELEPHONE (OUTPATIENT)
Dept: HEMATOLOGY/ONCOLOGY | Facility: CLINIC | Age: 39
End: 2024-04-18

## 2024-04-18 ENCOUNTER — APPOINTMENT (OUTPATIENT)
Dept: HEMATOLOGY/ONCOLOGY | Facility: HOSPITAL | Age: 39
End: 2024-04-18
Payer: COMMERCIAL

## 2024-04-18 ENCOUNTER — TUMOR BOARD CONFERENCE (OUTPATIENT)
Dept: HEMATOLOGY/ONCOLOGY | Facility: HOSPITAL | Age: 39
End: 2024-04-18
Payer: COMMERCIAL

## 2024-04-18 NOTE — PROGRESS NOTES
The patient is a 38 year old woman who has been diagnosed with breast cancer. I spoke with the patient by phone today regarding scalp cooling. See phone note. Per her authorization, I emailed information regarding possible foundation assistance for scalp cooling as well as process for requesting reimbursement through insurance to [email or web address]. I encouraged the patient to reach out to me if I can be of further assistance. Social work will remain available to assist this patient.    MONAE Gee, MARIZOL-S, CELSO-SW

## 2024-04-18 NOTE — TUMOR BOARD NOTE
MULTIDISCIPLINARY BREAST CANCER TUMOR BOARD CONFERENCE NOTE  Luz Rojo was presented at Breast Cancer Tumor Board Conference  Conference date: 4/18/2024  Presenting Provider(s): Dr. Liliana Barrett  Present at Conference: Medical Oncology, Radiation Oncology, Surgical Oncology, Radiology, and Pathology Representatives  Conference Review Type: Pathology Review    National Guidelines discussed: Yes    Surgical Resection: Yes, surgery is complete  Genomic Testing: no   Systemic therapy: Consider anthracycline, endocrine therapy and ovary suppression, followed by bisphosphonate   Clinical Trial Eligible: Eligible for [id number] with OncotypeDx confimation  Genetics: Negative    Referral Recommendations:  Radiation Oncology and Medical Oncology    Cancer Staging:  Cancer Staging   Malignant neoplasm of upper-outer quadrant of right breast in female, estrogen receptor positive (Multi)  Staging form: Breast, AJCC 8th Edition  - Clinical: Stage IB (cT1c, cN1, cM0, G2, ER+, MO+, HER2-) - Signed by Liliana Barrett DO on 2/6/2024  - Pathologic: Stage IB (pT1c, pN1(sn), cM0, G3, ER+, MO+, HER2-) - Signed by Liliana Barrett DO on 4/9/2024       Disclaimer  [facility] tumor board recommendations represent the consensus opinion of physicians present at a weekly patient care conference. The treating [facility] physician is not always present, and many of the physicians formulating the recommendation have not personally seen or examined the patient under discussion. It is understood that the treating [facility] physician considers the expertise of the Tumor Board Recommendation in formulating his/her plan for the patient. However, in many situations, based on individualized patient considerations, a different plan is determined by the treating physician to be the optimal medical management.    Scribe Attestation  By signing my name below, Alonso GARCIA Scribe   attest that this documentation has been prepared under the direction and in the  presence of BREAST TUMOR BOARD.

## 2024-04-18 NOTE — TELEPHONE ENCOUNTER
Patient Advice: Hello,  I am starting TC on Monday. I received a call from [employer] asking for payment to ship the cap. They said it was $1400.  I thought insurance was covering most of it and then possibly a sheela was covering the rest. Is there a way to confirm this? Thank you!     Alice

## 2024-04-18 NOTE — TELEPHONE ENCOUNTER
I spoke with the patient and informed her that scalp cooling is a self-pay service and we do not bill insurance. I explained that there are a few foundations offering assistance that she may qualify for (not guaranteed); she may also submit a claim to her insurance for reimbursement. She agreed that I send her more information via email ([email or web address]) for her review.    The patient inquired about caps available in the office if her's does not arrive in time for her first treatment on Monday. She also shared that her last treatment on June 24 has not yet shown up in [facility]. I explained that I will share these questions with HELEN Maki and ask her to follow-up. The patient expressed appreciation.

## 2024-04-19 DIAGNOSIS — C50.411 MALIGNANT NEOPLASM OF UPPER-OUTER QUADRANT OF RIGHT BREAST IN FEMALE, ESTROGEN RECEPTOR POSITIVE (MULTI): Primary | ICD-10-CM

## 2024-04-19 DIAGNOSIS — Z17.0 MALIGNANT NEOPLASM OF UPPER-OUTER QUADRANT OF RIGHT BREAST IN FEMALE, ESTROGEN RECEPTOR POSITIVE (MULTI): ICD-10-CM

## 2024-04-19 DIAGNOSIS — C50.411 MALIGNANT NEOPLASM OF UPPER-OUTER QUADRANT OF RIGHT BREAST IN FEMALE, ESTROGEN RECEPTOR POSITIVE (MULTI): ICD-10-CM

## 2024-04-19 DIAGNOSIS — Z17.0 MALIGNANT NEOPLASM OF UPPER-OUTER QUADRANT OF RIGHT BREAST IN FEMALE, ESTROGEN RECEPTOR POSITIVE (MULTI): Primary | ICD-10-CM

## 2024-04-19 RX ORDER — DIPHENHYDRAMINE HYDROCHLORIDE 50 MG/ML
50 INJECTION INTRAMUSCULAR; INTRAVENOUS ONCE
Status: CANCELLED | OUTPATIENT
Start: 2024-06-24 | End: 2024-06-24

## 2024-04-19 RX ORDER — EPINEPHRINE 0.3 MG/.3ML
0.3 INJECTION SUBCUTANEOUS EVERY 5 MIN PRN
OUTPATIENT
Start: 2024-06-24

## 2024-04-19 RX ORDER — PROCHLORPERAZINE MALEATE 10 MG
10 TABLET ORAL EVERY 6 HOURS PRN
OUTPATIENT
Start: 2024-06-24

## 2024-04-19 RX ORDER — PROCHLORPERAZINE EDISYLATE 5 MG/ML
10 INJECTION INTRAMUSCULAR; INTRAVENOUS EVERY 6 HOURS PRN
OUTPATIENT
Start: 2024-06-24

## 2024-04-19 RX ORDER — PALONOSETRON 0.05 MG/ML
0.25 INJECTION, SOLUTION INTRAVENOUS ONCE
OUTPATIENT
Start: 2024-06-24

## 2024-04-19 RX ORDER — FAMOTIDINE 10 MG/ML
20 INJECTION INTRAVENOUS ONCE AS NEEDED
OUTPATIENT
Start: 2024-06-24

## 2024-04-19 RX ORDER — DIPHENHYDRAMINE HYDROCHLORIDE 50 MG/ML
50 INJECTION INTRAMUSCULAR; INTRAVENOUS AS NEEDED
OUTPATIENT
Start: 2024-06-24

## 2024-04-19 RX ORDER — ALBUTEROL SULFATE 0.83 MG/ML
3 SOLUTION RESPIRATORY (INHALATION) AS NEEDED
OUTPATIENT
Start: 2024-06-24

## 2024-04-19 NOTE — TELEPHONE ENCOUNTER
I spoke with Alice. All questions regarding treatment side effects, symptoms, cooling cap and scheduling were answered. She was very appreciative and knows to call us if she has any questions or symptoms once starting treatment on 04/22.

## 2024-04-22 ENCOUNTER — INFUSION (OUTPATIENT)
Dept: HEMATOLOGY/ONCOLOGY | Facility: CLINIC | Age: 39
End: 2024-04-22
Payer: COMMERCIAL

## 2024-04-22 VITALS
RESPIRATION RATE: 18 BRPM | BODY MASS INDEX: 34.71 KG/M2 | OXYGEN SATURATION: 100 % | SYSTOLIC BLOOD PRESSURE: 178 MMHG | TEMPERATURE: 99 F | WEIGHT: 221.12 LBS | DIASTOLIC BLOOD PRESSURE: 78 MMHG | HEART RATE: 100 BPM | HEIGHT: 67 IN

## 2024-04-22 DIAGNOSIS — Z17.0 MALIGNANT NEOPLASM OF UPPER-OUTER QUADRANT OF RIGHT BREAST IN FEMALE, ESTROGEN RECEPTOR POSITIVE (MULTI): Primary | ICD-10-CM

## 2024-04-22 DIAGNOSIS — F41.9 ANXIETY AND DEPRESSION: ICD-10-CM

## 2024-04-22 DIAGNOSIS — C50.411 MALIGNANT NEOPLASM OF UPPER-OUTER QUADRANT OF RIGHT BREAST IN FEMALE, ESTROGEN RECEPTOR POSITIVE (MULTI): Primary | ICD-10-CM

## 2024-04-22 DIAGNOSIS — F32.A ANXIETY AND DEPRESSION: ICD-10-CM

## 2024-04-22 DIAGNOSIS — C50.411 MALIGNANT NEOPLASM OF UPPER-OUTER QUADRANT OF RIGHT BREAST IN FEMALE, ESTROGEN RECEPTOR POSITIVE (MULTI): ICD-10-CM

## 2024-04-22 DIAGNOSIS — Z17.0 MALIGNANT NEOPLASM OF UPPER-OUTER QUADRANT OF RIGHT BREAST IN FEMALE, ESTROGEN RECEPTOR POSITIVE (MULTI): ICD-10-CM

## 2024-04-22 LAB
ALBUMIN SERPL BCP-MCNC: 4.4 G/DL (ref 3.4–5)
ALP SERPL-CCNC: 60 U/L (ref 33–110)
ALT SERPL W P-5'-P-CCNC: 18 U/L (ref 7–45)
ANION GAP SERPL CALC-SCNC: 12 MMOL/L (ref 10–20)
AST SERPL W P-5'-P-CCNC: 15 U/L (ref 9–39)
BASOPHILS # BLD AUTO: 0.01 X10*3/UL (ref 0–0.1)
BASOPHILS NFR BLD AUTO: 0.1 %
BILIRUB SERPL-MCNC: 0.3 MG/DL (ref 0–1.2)
BUN SERPL-MCNC: 15 MG/DL (ref 6–23)
CALCIUM SERPL-MCNC: 9.6 MG/DL (ref 8.6–10.3)
CHLORIDE SERPL-SCNC: 105 MMOL/L (ref 98–107)
CO2 SERPL-SCNC: 24 MMOL/L (ref 21–32)
CREAT SERPL-MCNC: 0.68 MG/DL (ref 0.5–1.05)
EGFRCR SERPLBLD CKD-EPI 2021: >90 ML/MIN/1.73M*2
EOSINOPHIL # BLD AUTO: 0 X10*3/UL (ref 0–0.7)
EOSINOPHIL NFR BLD AUTO: 0 %
ERYTHROCYTE [DISTWIDTH] IN BLOOD BY AUTOMATED COUNT: 12.4 % (ref 11.5–14.5)
GLUCOSE SERPL-MCNC: 118 MG/DL (ref 74–99)
HCT VFR BLD AUTO: 35.7 % (ref 36–46)
HGB BLD-MCNC: 11.7 G/DL (ref 12–16)
IMM GRANULOCYTES # BLD AUTO: 0.02 X10*3/UL (ref 0–0.7)
IMM GRANULOCYTES NFR BLD AUTO: 0.2 % (ref 0–0.9)
LYMPHOCYTES # BLD AUTO: 1.8 X10*3/UL (ref 1.2–4.8)
LYMPHOCYTES NFR BLD AUTO: 14.4 %
MCH RBC QN AUTO: 29.1 PG (ref 26–34)
MCHC RBC AUTO-ENTMCNC: 32.8 G/DL (ref 32–36)
MCV RBC AUTO: 89 FL (ref 80–100)
MONOCYTES # BLD AUTO: 0.76 X10*3/UL (ref 0.1–1)
MONOCYTES NFR BLD AUTO: 6.1 %
NEUTROPHILS # BLD AUTO: 9.93 X10*3/UL (ref 1.2–7.7)
NEUTROPHILS NFR BLD AUTO: 79.2 %
PLATELET # BLD AUTO: 301 X10*3/UL (ref 150–450)
POTASSIUM SERPL-SCNC: 3.9 MMOL/L (ref 3.5–5.3)
PROT SERPL-MCNC: 8 G/DL (ref 6.4–8.2)
RBC # BLD AUTO: 4.02 X10*6/UL (ref 4–5.2)
SODIUM SERPL-SCNC: 137 MMOL/L (ref 136–145)
WBC # BLD AUTO: 12.5 X10*3/UL (ref 4.4–11.3)

## 2024-04-22 PROCEDURE — 96375 TX/PRO/DX INJ NEW DRUG ADDON: CPT | Mod: INF

## 2024-04-22 PROCEDURE — 80053 COMPREHEN METABOLIC PANEL: CPT

## 2024-04-22 PROCEDURE — 96361 HYDRATE IV INFUSION ADD-ON: CPT | Mod: INF

## 2024-04-22 PROCEDURE — 85025 COMPLETE CBC W/AUTO DIFF WBC: CPT

## 2024-04-22 PROCEDURE — 96417 CHEMO IV INFUS EACH ADDL SEQ: CPT

## 2024-04-22 PROCEDURE — 96367 TX/PROPH/DG ADDL SEQ IV INF: CPT

## 2024-04-22 PROCEDURE — 96377 APPLICATON ON-BODY INJECTOR: CPT | Mod: 59

## 2024-04-22 PROCEDURE — 96413 CHEMO IV INFUSION 1 HR: CPT

## 2024-04-22 PROCEDURE — 2500000004 HC RX 250 GENERAL PHARMACY W/ HCPCS (ALT 636 FOR OP/ED): Performed by: INTERNAL MEDICINE

## 2024-04-22 RX ORDER — LORAZEPAM 2 MG/ML
0.5 INJECTION INTRAMUSCULAR EVERY 4 HOURS PRN
Status: DISCONTINUED | OUTPATIENT
Start: 2024-04-22 | End: 2024-04-22 | Stop reason: HOSPADM

## 2024-04-22 RX ORDER — HEPARIN SODIUM,PORCINE/PF 10 UNIT/ML
50 SYRINGE (ML) INTRAVENOUS AS NEEDED
Status: CANCELLED | OUTPATIENT
Start: 2024-04-22

## 2024-04-22 RX ORDER — PALONOSETRON 0.05 MG/ML
0.25 INJECTION, SOLUTION INTRAVENOUS ONCE
Status: COMPLETED | OUTPATIENT
Start: 2024-04-22 | End: 2024-04-22

## 2024-04-22 RX ORDER — PROCHLORPERAZINE MALEATE 10 MG
10 TABLET ORAL EVERY 6 HOURS PRN
Status: DISCONTINUED | OUTPATIENT
Start: 2024-04-22 | End: 2024-04-22 | Stop reason: HOSPADM

## 2024-04-22 RX ORDER — LORAZEPAM 2 MG/ML
0.5 INJECTION INTRAMUSCULAR EVERY 4 HOURS PRN
Status: CANCELLED | OUTPATIENT
Start: 2024-04-22

## 2024-04-22 RX ORDER — PROCHLORPERAZINE EDISYLATE 5 MG/ML
10 INJECTION INTRAMUSCULAR; INTRAVENOUS EVERY 6 HOURS PRN
Status: DISCONTINUED | OUTPATIENT
Start: 2024-04-22 | End: 2024-04-22 | Stop reason: HOSPADM

## 2024-04-22 RX ORDER — HEPARIN 100 UNIT/ML
500 SYRINGE INTRAVENOUS AS NEEDED
Status: CANCELLED | OUTPATIENT
Start: 2024-04-22

## 2024-04-22 RX ORDER — DIPHENHYDRAMINE HYDROCHLORIDE 50 MG/ML
50 INJECTION INTRAMUSCULAR; INTRAVENOUS AS NEEDED
Status: DISCONTINUED | OUTPATIENT
Start: 2024-04-22 | End: 2024-04-22 | Stop reason: HOSPADM

## 2024-04-22 RX ORDER — LORAZEPAM 0.5 MG/1
0.5 TABLET ORAL EVERY 6 HOURS PRN
Qty: 30 TABLET | Refills: 0 | Status: SHIPPED | OUTPATIENT
Start: 2024-04-22

## 2024-04-22 RX ORDER — ALBUTEROL SULFATE 0.83 MG/ML
3 SOLUTION RESPIRATORY (INHALATION) AS NEEDED
Status: DISCONTINUED | OUTPATIENT
Start: 2024-04-22 | End: 2024-04-22 | Stop reason: HOSPADM

## 2024-04-22 RX ORDER — EPINEPHRINE 0.3 MG/.3ML
0.3 INJECTION SUBCUTANEOUS EVERY 5 MIN PRN
Status: DISCONTINUED | OUTPATIENT
Start: 2024-04-22 | End: 2024-04-22 | Stop reason: HOSPADM

## 2024-04-22 RX ORDER — FAMOTIDINE 10 MG/ML
20 INJECTION INTRAVENOUS ONCE AS NEEDED
Status: DISCONTINUED | OUTPATIENT
Start: 2024-04-22 | End: 2024-04-22 | Stop reason: HOSPADM

## 2024-04-22 RX ADMIN — DEXAMETHASONE SODIUM PHOSPHATE 16 MG: 10 INJECTION, SOLUTION INTRAMUSCULAR; INTRAVENOUS at 12:57

## 2024-04-22 RX ADMIN — PALONOSETRON HYDROCHLORIDE 250 MCG: 0.25 INJECTION INTRAVENOUS at 12:47

## 2024-04-22 RX ADMIN — PEGFILGRASTIM 6 MG: KIT SUBCUTANEOUS at 16:10

## 2024-04-22 RX ADMIN — CYCLOPHOSPHAMIDE 1296 MG: 1 INJECTION, POWDER, FOR SOLUTION INTRAVENOUS; ORAL at 16:05

## 2024-04-22 RX ADMIN — DIPHENHYDRAMINE HYDROCHLORIDE 50 MG: 50 INJECTION, SOLUTION INTRAMUSCULAR; INTRAVENOUS at 13:14

## 2024-04-22 RX ADMIN — LORAZEPAM 0.5 MG: 2 INJECTION, SOLUTION INTRAMUSCULAR; INTRAVENOUS at 12:42

## 2024-04-22 RX ADMIN — SODIUM CHLORIDE 500 ML: 9 INJECTION, SOLUTION INTRAVENOUS at 12:22

## 2024-04-22 RX ADMIN — FOSAPREPITANT 150 MG: 150 INJECTION, POWDER, LYOPHILIZED, FOR SOLUTION INTRAVENOUS at 13:35

## 2024-04-22 RX ADMIN — DOCETAXEL 160 MG: 20 INJECTION, SOLUTION INTRAVENOUS at 14:53

## 2024-04-22 ASSESSMENT — PAIN SCALES - GENERAL: PAINLEVEL: 0-NO PAIN

## 2024-04-23 ENCOUNTER — TELEMEDICINE (OUTPATIENT)
Dept: INTEGRATIVE MEDICINE | Facility: CLINIC | Age: 39
End: 2024-04-23
Payer: COMMERCIAL

## 2024-04-23 ENCOUNTER — APPOINTMENT (OUTPATIENT)
Dept: INTEGRATIVE MEDICINE | Facility: CLINIC | Age: 39
End: 2024-04-23
Payer: COMMERCIAL

## 2024-04-23 VITALS — BODY MASS INDEX: 35.03 KG/M2 | WEIGHT: 218 LBS | HEIGHT: 66 IN

## 2024-04-23 DIAGNOSIS — R11.2 NAUSEA AND VOMITING, UNSPECIFIED VOMITING TYPE: ICD-10-CM

## 2024-04-23 DIAGNOSIS — E53.8 VITAMIN B12 DEFICIENCY: Primary | ICD-10-CM

## 2024-04-23 DIAGNOSIS — Z17.0 MALIGNANT NEOPLASM OF UPPER-OUTER QUADRANT OF RIGHT BREAST IN FEMALE, ESTROGEN RECEPTOR POSITIVE (MULTI): ICD-10-CM

## 2024-04-23 DIAGNOSIS — E03.9 ACQUIRED HYPOTHYROIDISM: ICD-10-CM

## 2024-04-23 DIAGNOSIS — F32.A ANXIETY AND DEPRESSION: ICD-10-CM

## 2024-04-23 DIAGNOSIS — E55.9 VITAMIN D DEFICIENCY: ICD-10-CM

## 2024-04-23 DIAGNOSIS — F41.9 ANXIETY AND DEPRESSION: ICD-10-CM

## 2024-04-23 DIAGNOSIS — C50.411 MALIGNANT NEOPLASM OF UPPER-OUTER QUADRANT OF RIGHT BREAST IN FEMALE, ESTROGEN RECEPTOR POSITIVE (MULTI): ICD-10-CM

## 2024-04-23 PROCEDURE — G2212 PROLONG OUTPT/OFFICE VIS: HCPCS | Performed by: INTERNAL MEDICINE

## 2024-04-23 PROCEDURE — 1036F TOBACCO NON-USER: CPT | Performed by: INTERNAL MEDICINE

## 2024-04-23 PROCEDURE — 99215 OFFICE O/P EST HI 40 MIN: CPT | Performed by: INTERNAL MEDICINE

## 2024-04-23 SDOH — SOCIAL STABILITY: SOCIAL NETWORK: I HAVE TROUBLE DOING ALL OF MY REGULAR LEISURE ACTIVITIES WITH OTHERS: NEVER

## 2024-04-23 SDOH — SOCIAL STABILITY: SOCIAL NETWORK

## 2024-04-23 SDOH — SOCIAL STABILITY: SOCIAL NETWORK: I HAVE TROUBLE DOING ALL OF THE ACTIVITIES WITH FRIENDS THAT I WANT TO DO: NEVER

## 2024-04-23 SDOH — SOCIAL STABILITY: SOCIAL NETWORK: I HAVE TROUBLE DOING ALL OF MY USUAL WORK (INCLUDE WORK AT HOME): NEVER

## 2024-04-23 SDOH — SOCIAL STABILITY: SOCIAL NETWORK: PROMIS ABILITY TO PARTICIPATE IN SOCIAL ROLES & ACTIVITIES T-SCORE: 64

## 2024-04-23 SDOH — SOCIAL STABILITY: SOCIAL NETWORK: I HAVE TROUBLE DOING ALL OF THE FAMILY ACTIVITIES THAT I WANT TO DO: NEVER

## 2024-04-23 ASSESSMENT — ENCOUNTER SYMPTOMS
FEVER: 0
BACK PAIN: 0
WEAKNESS: 0
COUGH: 0
HEADACHES: 0
SLEEP DISTURBANCE: 1
ARTHRALGIAS: 0
DIARRHEA: 0
CONSTIPATION: 0
FATIGUE: 1
NERVOUS/ANXIOUS: 1
DYSURIA: 0
MYALGIAS: 0
DIFFICULTY URINATING: 0
APPETITE CHANGE: 0
SHORTNESS OF BREATH: 0

## 2024-04-23 NOTE — PATIENT INSTRUCTIONS
K95 mask for protecting you when back to work.   Get your b12 level tested.   Consider home sleep study when done with chemo.   Continue to monitor your thyroid every three months. Would like TSH to be below 2.5.  Try out guided imagery with Adelita Bonner  Send me a message if your labs don't come back . They were added on. I will let you know.   7.  Try out acupuncture for nausea and stress relief.    8. Take a short walk outside when you have enough energy for stress relief (10-15 minutes max)    Return in six weeks.   Angelina Elliott MD PhD

## 2024-04-23 NOTE — PROGRESS NOTES
"Integrative Medicine Visit:     Subjective   Patient ID: Luz Rojo \"Alfonzo" is a 38 y.o. female who presents for Breast Cancer and Anxiety       HPI  Breast cancer: diagnosed with first mammogram that she had breast cancer. Had surgery in March. It had spread to a node. On chemo for four rounds. Also plans to get radiation and then possibly start oral chemo. (Aromatase inhibitor)    Suffering from anxiety for many years.  Much worse since diagnosis. Of breast cancer  Getting anxious when seh goes to appointments and her bp is elevated at appointments.  Has been on meds in the past for her anxiety.   Used to be pretty active- jog every day.  Used to do talk therapy for a while but did not find it helpful. Was feeling good.   Feels her head is racing. She also increased her zoloft back to 100 mg by mouth daily to help her anxiety. Thinks it is helping her. Thknks she did not have depression-= mostly anxiety.     Heavy menstrual cycles: earlier in  and she had a heavy period one time.  Not regularly having heavy periods. Just five days total.    Hypothyroidism; dx after daughter turned about [age] years of age.     Lab Results   Component Value Date    ELJBFLFJ32 281 2024      Lab Results   Component Value Date    TSH 2.74 2024     Lab Results   Component Value Date    VITD25 14 (L) 2024            CONCERNS:  interested in how to manage her anxiety while going through chemo. Long term, interested in how to prevent recurrence of breast cancer.     PMH:    Patient Active Problem List   Diagnosis    Anxiety and depression    Depression, major, single episode, mild (CMS-HCC)    Fatigue    Hyperlipidemia    Hypothyroidism    Vitamin D deficiency    Class 2 obesity    Abnormal weight gain    Cough    History of  section    History of depression    Shoulder pain    Concentration deficit    Sinusitis chronic, frontal    Menorrhagia with regular cycle    Dysmenorrhea    Annual physical exam    " "Abnormal mammogram    Axillary lymphadenopathy    Malignant neoplasm of upper-outer quadrant of right breast in female, estrogen receptor positive (Multi)        FamMH:  Dad's mother had breast cancer.     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     NUTRITION: did not discuss. (Would discuss once through chemo and radiation.)    Smoking:  non-smoker    Alcohol use:  social drinker 1 glass of wine per week     Exercise:  prior to diagnosis  2x per week and would go to the rec and do the treadmill and some light weights.     SLEEP: sleep is ok. Very tired so sleeps well. Does not wake a lot.  She snores. Feels well rested after sleeping. Does not fall asleep driving or during day. No naps.     STRESS MANAGEMENT: exercise.  Sometimes just spends time alone.  Finds peace when she is alone.   SUPPORT: family and .     Review of Systems   Constitutional:  Positive for fatigue. Negative for appetite change and fever.   HENT:  Negative for congestion and hearing loss.    Eyes:  Negative for visual disturbance.   Respiratory:  Negative for cough and shortness of breath.    Cardiovascular:  Negative for chest pain and leg swelling.   Gastrointestinal:  Negative for constipation and diarrhea.   Genitourinary:  Negative for difficulty urinating, dysuria and urgency.   Musculoskeletal:  Negative for arthralgias, back pain and myalgias.   Skin:  Negative for rash.   Neurological:  Negative for weakness and headaches.   Psychiatric/Behavioral:  Positive for sleep disturbance. Negative for behavioral problems. The patient is nervous/anxious.             Pain:    Objective   Ht 1.676 m (5' 6\")   Wt 98.9 kg (218 lb)   BMI 35.19 kg/m²       Physical Exam  Constitutional:       General: She is not in acute distress.     Appearance: She is not ill-appearing, toxic-appearing or diaphoretic.   Pulmonary:      Effort: Pulmonary effort is normal. No respiratory distress. "   Musculoskeletal:         General: No deformity.      Cervical back: Normal range of motion.      Comments: Upper extremities normal range of motion grossly   Skin:     Coloration: Skin is not jaundiced or pale.      Findings: No rash.   Psychiatric:         Mood and Affect: Mood normal.         Behavior: Behavior normal.                      Assessment/Plan     Problem List Items Addressed This Visit             ICD-10-CM    Anxiety and depression F41.9, F32.A    Relevant Orders    Referral to [email or web address]    Hypothyroidism E03.9    Relevant Orders    Thyroid Stimulating Hormone    Vitamin D deficiency E55.9    Relevant Orders    Vitamin D 25-Hydroxy,Total (for eval of Vitamin D levels)    Malignant neoplasm of upper-outer quadrant of right breast in female, estrogen receptor positive (Multi) C50.411, Z17.0    Relevant Orders    Referral to [email or web address]     Other Visit Diagnoses         Codes    Vitamin B12 deficiency    -  Primary E53.8    Relevant Orders    Vitamin B12    Nausea and vomiting, unspecified vomiting type     R11.2    Relevant Orders    Referral to [email or web address]          Anxiety: her tsh is slightly above goal of under 2.5. sometimes getting tsh closer to 1 will help with anxiety. Will recheck. Also her mother has a hx of severe anxiety which improved dramatically with supplementation of b12. Continue supplementation. Check vitamin b 12 because if the omeprazole htat she takes is inhibiting absorption of her b12 then would start injections.   Optimize vitamin d level.   Work on anxiety with mindfulness practice.   Trial of acupuncture.       Recommend Follow up in : 2 months    Angelina Elliott MD PhD

## 2024-04-24 ENCOUNTER — APPOINTMENT (OUTPATIENT)
Dept: INTEGRATIVE MEDICINE | Facility: CLINIC | Age: 39
End: 2024-04-24
Payer: COMMERCIAL

## 2024-04-29 ENCOUNTER — APPOINTMENT (OUTPATIENT)
Dept: HEMATOLOGY/ONCOLOGY | Facility: CLINIC | Age: 39
End: 2024-04-29
Payer: COMMERCIAL

## 2024-04-29 ENCOUNTER — INFUSION (OUTPATIENT)
Dept: HEMATOLOGY/ONCOLOGY | Facility: CLINIC | Age: 39
End: 2024-04-29
Payer: COMMERCIAL

## 2024-04-29 VITALS
DIASTOLIC BLOOD PRESSURE: 101 MMHG | SYSTOLIC BLOOD PRESSURE: 162 MMHG | BODY MASS INDEX: 35.87 KG/M2 | OXYGEN SATURATION: 99 % | RESPIRATION RATE: 18 BRPM | HEART RATE: 135 BPM | WEIGHT: 222.22 LBS | TEMPERATURE: 97.7 F

## 2024-04-29 DIAGNOSIS — Z17.0 MALIGNANT NEOPLASM OF UPPER-OUTER QUADRANT OF RIGHT BREAST IN FEMALE, ESTROGEN RECEPTOR POSITIVE (MULTI): Primary | ICD-10-CM

## 2024-04-29 DIAGNOSIS — C50.411 MALIGNANT NEOPLASM OF UPPER-OUTER QUADRANT OF RIGHT BREAST IN FEMALE, ESTROGEN RECEPTOR POSITIVE (MULTI): Primary | ICD-10-CM

## 2024-04-29 DIAGNOSIS — C50.411 MALIGNANT NEOPLASM OF UPPER-OUTER QUADRANT OF RIGHT BREAST IN FEMALE, ESTROGEN RECEPTOR POSITIVE (MULTI): ICD-10-CM

## 2024-04-29 DIAGNOSIS — Z17.0 MALIGNANT NEOPLASM OF UPPER-OUTER QUADRANT OF RIGHT BREAST IN FEMALE, ESTROGEN RECEPTOR POSITIVE (MULTI): ICD-10-CM

## 2024-04-29 PROCEDURE — 96360 HYDRATION IV INFUSION INIT: CPT | Mod: INF

## 2024-04-29 PROCEDURE — 2500000004 HC RX 250 GENERAL PHARMACY W/ HCPCS (ALT 636 FOR OP/ED): Performed by: INTERNAL MEDICINE

## 2024-04-29 RX ORDER — SODIUM CHLORIDE 9 MG/ML
1000 INJECTION, SOLUTION INTRAVENOUS ONCE
Status: CANCELLED | OUTPATIENT
Start: 2024-04-29 | End: 2024-04-29

## 2024-04-29 RX ORDER — HEPARIN 100 UNIT/ML
500 SYRINGE INTRAVENOUS AS NEEDED
Status: CANCELLED | OUTPATIENT
Start: 2024-04-29

## 2024-04-29 RX ORDER — HEPARIN SODIUM,PORCINE/PF 10 UNIT/ML
50 SYRINGE (ML) INTRAVENOUS AS NEEDED
Status: CANCELLED | OUTPATIENT
Start: 2024-04-29

## 2024-04-29 RX ADMIN — SODIUM CHLORIDE 1000 ML/HR: 9 INJECTION, SOLUTION INTRAVENOUS at 15:05

## 2024-04-29 ASSESSMENT — PAIN SCALES - GENERAL: PAINLEVEL: 0-NO PAIN

## 2024-05-01 ENCOUNTER — APPOINTMENT (OUTPATIENT)
Dept: INTEGRATIVE MEDICINE | Facility: CLINIC | Age: 39
End: 2024-05-01
Payer: COMMERCIAL

## 2024-05-03 DIAGNOSIS — Z00.00 HEALTHCARE MAINTENANCE: ICD-10-CM

## 2024-05-03 DIAGNOSIS — C50.411 MALIGNANT NEOPLASM OF UPPER-OUTER QUADRANT OF RIGHT BREAST IN FEMALE, ESTROGEN RECEPTOR POSITIVE (MULTI): ICD-10-CM

## 2024-05-03 DIAGNOSIS — Z17.0 MALIGNANT NEOPLASM OF UPPER-OUTER QUADRANT OF RIGHT BREAST IN FEMALE, ESTROGEN RECEPTOR POSITIVE (MULTI): ICD-10-CM

## 2024-05-06 RX ORDER — OMEGA-3-ACID ETHYL ESTERS 1 G/1
1 CAPSULE, LIQUID FILLED ORAL DAILY
Qty: 90 CAPSULE | Refills: 1 | Status: SHIPPED | OUTPATIENT
Start: 2024-05-06

## 2024-05-06 NOTE — PROGRESS NOTES
Staff Physician: Gómez Richter MD, PhD  Referring Physician: Liliana Barrett DO  Date of Service: 5/7/2024    RADIATION ONCOLOGY CONSULT NOTE    IDENTIFYING DATA:   Cancer Staging   Malignant neoplasm of upper-outer quadrant of right breast in female, estrogen receptor positive (Multi)  Staging form: Breast, AJCC 8th Edition  - Clinical: Stage IB (cT1c, cN1, cM0, G2, ER+, NJ+, HER2-) - Signed by Liliana Barrett DO on 2/6/2024  - Pathologic: Stage IB (pT1c, pN1(sn), cM0, G3, ER+, NJ+, HER2-) - Signed by Liliana Barrett DO on 4/9/2024    Problem List Items Addressed This Visit       Anxiety and depression    Depression, major, single episode, mild (CMS-HCC)    Fatigue    Hypothyroidism    Class 2 obesity    Axillary lymphadenopathy    Malignant neoplasm of upper-outer quadrant of right breast in female, estrogen receptor positive (Multi) - Primary    Relevant Orders    Referral to Radiation Oncology    Rad Onc Intent to Treat       HISTORY OF PRESENT ILLNESS:    Ms. Luz Rojo is a 38 y.o.-year-old with RIGHT IDC s/p upfront lumpectomy and sentinel lymph node biopsy that showed 2.0 cm invasive ductal carcinoma, grade 3 with associated high-grade DCIS measuring 2.8 cm with comedonecrosis, negative surgical margins, though the posterior margin on the DCIS was less than 2 mm, 2 out of 6 lymph nodes involved with microscopic extranodal extension, ER 60-70% positive, NJ 80-90% positive, HER2-, final stage pT1c N1a anatomic stage IIa prognostic stage Ib    Her brief oncologic history is as follows:    January 24, 2024: First ever screening mammogram showed an abnormality of the right breast prompting further imaging.    January 26, 2024 diagnostic imaging showed a spiculated mass at the 10:30 position 10 cm from the nipple measuring 2 cm on imaging.  There were 2 abnormal axillary lymph nodes noted.  Biopsy was recommended.    January 30, 2024: Ultrasound-guided core needle biopsy showed invasive ductal  carcinoma, grade 2, ER 60 to 70% positive, DE 80-90% positive, HER2 1+ nonamplified.  Biopsy of the axillary lymph node revealed metastatic breast carcinoma.    February 6, 2024: Staging scans with CT chest abdomen pelvis and bone scan showed no evidence of distant metastatic disease.    She was seen by Dr. Liliana Barrett and surgical oncology to discuss treatment options.  Patient elected to proceed with lumpectomy and sentinel lymph node biopsy.    March 22, 2024: Right lumpectomy and sentinel lymph node biopsy showed 2.0 cm invasive ductal carcinoma, grade 3 with associated high-grade DCIS measuring 2.8 cm with comedonecrosis, negative surgical margins, though the posterior margin on the DCIS was less than 2 mm, 2 out of 6 lymph nodes involved with microscopic extranodal extension, ER 60-70% positive, DE 80-90% positive, HER2-, final stage pT1c N1a anatomic stage IIa prognostic stage Ib    April 10, 2024: Patient seen by Dr. Orlando in medical oncology.  Discussed oral chemotherapy for premenopausal node positive patient and discussed Adriamycin based chemo versus TC alone.  Given concerns for side effects including emesis with Adriamycin, patient elected to proceed with TC based chemotherapy which was recently initiated.    April 18, 2024: Patient's case discussed at multidisciplinary tumor board.  Recommendation was for referral to medical and radiation oncology.  Surgery was complete but plan to consider adjuvant chemotherapy.  Also consider  CTG 1119 (MA.39) for which she might be eligible if her Oncotype score were low.    April 23, 2024: Patient seen in integrative medicine by Dr. Elliott with plans to check vitamin levels acupuncture active.      Today she presents to discuss the role of adjuvant radiation therapy for her node positive breast cancer.  She reports that she is healing well from her surgery and tolerating chemotherapy well.  She is scheduled to complete chemotherapy on June 24, 2024.  She  denies fever, chills, night sweats, infection, headaches, vision changes, bone pain, or shortness of breath.    PAST MEDICAL HISTORY:  Past Medical History:   Diagnosis Date    Anxiety     Breast cancer (Multi)     Candidiasis, unspecified     Yeast infection    Depression     Encounter for routine postpartum follow-up ([facility]) 2018    Routine postpartum follow-up    Hyperlipidemia     Hypothyroidism     Maternal care for unspecified type scar from previous  delivery ([facility]) 2018    Previous  delivery affecting pregnancy    Nonpurulent mastitis associated with the puerperium ([facility]) 2018    Mastitis, postpartum    Personal history of other diseases of the respiratory system 2019    History of sore throat    Personal history of other mental and behavioral disorders     History of depression    PONV (postoperative nausea and vomiting)     Streptococcal pharyngitis 2019    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    ALLERGIES:  No Known Allergies  MEDICATIONS:    Current Outpatient Medications:     busPIRone (Buspar) 5 mg tablet, Take 1 tablet (5 mg) by mouth 3 times a day. (Patient taking differently: Take 1 tablet (5 mg) by mouth once daily.), Disp: 90 tablet, Rfl: 0    cholecalciferol (Vitamin D3) 5,000 Units tablet, Take 1 tablet (5,000 Units) by mouth once daily., Disp: , Rfl:     cyanocobalamin, vitamin B-12, (VITAMIN B-12 ORAL), Take 1 capsule by mouth once daily., Disp: , Rfl:     dexAMETHasone (Decadron) 4 mg tablet, Take 2 tablets (8 mg) by mouth 2 times a day. Take 2 tablets prior to treatment in the AM nothing the night  of. Do not take same day as treatment. Take 2 tablets twice a day for 3 days following treatment, Disp: 60 tablet, Rfl: 2    diphenhydramine-dexamethasone-nystatin-doxycycline (MAGIC MouthWASH) oral suspension, Use 30 mL in the mouth or throat 4 times a day as needed for mucositis., Disp: 60 mL, Rfl: 1    levothyroxine (Synthroid, Levoxyl) 88 mcg tablet, TAKE 1 TABLET BY MOUTH EVERY DAY, Disp: 90 tablet, Rfl: 1    LORazepam (Ativan) 0.5 mg tablet, Take 1 tablet (0.5 mg) by mouth every 6 hours if needed for anxiety., Disp: 30 tablet, Rfl: 0    omega-3 acid ethyl esters (Lovaza) 1 gram capsule, TAKE 1 CAPSULE (1 G) BY MOUTH ONCE DAILY., Disp: 90 capsule, Rfl: 1    ondansetron (Zofran) 8 mg tablet, Take 1 tablet (8 mg) by mouth every 8 hours if needed for nausea or vomiting., Disp: 30 tablet, Rfl: 5    prochlorperazine (Compazine) 10 mg tablet, Take 1 tablet (10 mg) by mouth every 6 hours if needed for nausea or vomiting., Disp: 30 tablet, Rfl: 5    sertraline (Zoloft) 50 mg tablet, Take 1 tablet (50 mg) by mouth once daily., Disp: , Rfl:     hydrOXYzine HCL (Atarax) 10 mg tablet, Take 1 tablet (10 mg) by mouth every 8 hours if needed for itching. (Patient not taking: Reported on 2024), Disp: 40 tablet, Rfl: 1   SOCIAL HISTORY:  Social History     Tobacco Use    Smoking status: Never    Smokeless tobacco: Never   Substance Use Topics    Alcohol use: Yes     Comment: occasional     FAMILY HISTORY:  Family History   Problem Relation Name Age of Onset    Breast cancer Paternal Grandmother  40 - 49   Maternal grandfather with colon cancer in his 70s.    REVIEW OF SYSTEMS:  Review of systems and pain noted in the nursing note that accompanies this encounter.    RADIATION SCREENING QUESTIONS:  Prior radiation therapy: No  Pacemaker: No  Other implantable devices: No  Connective tissue disease: No    PERFORMANCE STATUS:  Karnofsky Performance Score/ECO, Fully active, able to carry on all pre-disease performed  without restriction (ECOG equivalent 0)    PHYSICAL EXAMINATION:  BP (!) 161/96 (BP Location: Left arm, Patient Position: Sitting, BP Cuff Size: Large adult)   Pulse 96   Temp 36.2 °C (97.2 °F) (Temporal)   Resp 18   Wt 98.8 kg (217 lb 13 oz)   SpO2 100%   BMI 35.16 kg/m²   Pain score: 0/10  General: no acute distress, engaged in conversation.   HEENT: Normocephalic, atraumatic. Extraocular movements are intact.   Neck: supple with trachea at midline, no palpable adenopathy.   Pulmonary: Breathing comfortably on room air, no respiratory distress  Cardiovascular: Regular rate, no cyanosis, well-perfused  Abdomen: Soft, nontender, nondistended.   Extremities: No lower extremity edema or cyanosis. Normal range of motion.  Skin: Without rash or obvious lesions.   Musculoskeletal: Normal range of motion. Able to raise both arms above head without issues  Neurologic: Alert and oriented x3. Cranial nerves grossly intact.   Breast: Examination of the bilateral breast reveals no suspicious masses or adenopathy.  Well-healed surgical incision with mild swelling over the right breast.    LABORATORY AND IMAGING DATA:  Imaging: All imaging was personally reviewed and interpreted in clinic. Findings as per HPI and EMR.    Laboratory/Pathology:  All pertinent labs and pathology were personally reviewed and interpreted in clinic. Findings as per HPI and EMR.  INVASIVE CARCINOMA OF THE BREAST: RESECTION - All Specimens  SPECIMEN   Procedure  Excision (less than total mastectomy)   Specimen Laterality  Right   TUMOR   Histologic Type  Invasive carcinoma of no special type (ductal)   Histologic Grade ([place] Histologic Score)     Glandular (Acinar) / Tubular Differentiation  Score 3   Nuclear Pleomorphism  Score 3   Mitotic Rate  Score 2   Overall Grade  Grade 3 (scores of 8 or 9)   Tumor Size  Greatest dimension of largest invasive focus (Millimeters): 20 mm   Tumor Focality  Single focus of invasive carcinoma   Ductal  "Carcinoma In Situ (DCIS)  Present   Size (Extent) of DCIS  Estimated size (extent) of DCIS is at least (Millimeters): 28 mm   Architectural Patterns  Solid   Nuclear Grade  Grade III (high)   Necrosis  Present, central (expansive \"comedo\" necrosis)   Lymphatic and / or Vascular Invasion  Not identified   Treatment Effect in the Breast  No known presurgical therapy   MARGINS   Margin Status for Invasive Carcinoma  All margins negative for invasive carcinoma   Distance from Invasive Carcinoma to Closest Margin  Greater than: 2 mm   Margin Status for DCIS  All margins negative for DCIS   Distance from DCIS to Closest Margin  Less than: 2 mm   Closest Margin(s) to DCIS  Posterior   Distance from DCIS to Anterior Margin  Greater than: 2 mm   Distance from DCIS to Posterior Margin  Less than: 2 mm   Distance from DCIS to Superior Margin  Greater than: 2 mm   Distance from DCIS to Inferior Margin  Greater than: 2 mm   Distance from DCIS to Medial Margin  Greater than: 2 mm   Distance from DCIS to Lateral Margin  Greater than: 2 mm   REGIONAL LYMPH NODES   Regional Lymph Node Status  Tumor present in regional lymph node(s)   Number of Lymph Nodes with Macrometastases  2   Number of Lymph Nodes with Micrometastases  0   Size of Largest Krzysztof Metastatic Deposit  24 mm   Extranodal Extension  Present, 2 mm or less   Total Number of Lymph Nodes Examined (sentinel and non-sentinel)  6       IMPRESSION:    Ms. Luz Rojo is a 38 y.o.-year-old with RIGHT IDC s/p upfront lumpectomy and sentinel lymph node biopsy that showed 2.0 cm invasive ductal carcinoma, grade 3 with associated high-grade DCIS measuring 2.8 cm with comedonecrosis, negative surgical margins, though the posterior margin on the DCIS was less than 2 mm, 2 out of 6 lymph nodes involved with microscopic extranodal extension, ER 60-70% positive, MA 80-90% positive, HER2-, final stage pT1c N1a anatomic stage IIA, prognostic stage IB.    The patient is doing well > " 6 weeks out from her lumpectomy and SLNB, with well-healing incision, no significant seroma or lumpectomy retraction, and no other suspicious findings in the breast.     We discussed with MsKelvin Rojo that lumpectomy followed by whole breast radiation is the standard of care for breast conservation candidates. Post-lumpectomy radiation reduces the risk of locoregional recurrences by 2/3, reduces the risk of overall recurrence by 1/2, and afford a modest survival benefit according to the EBCTCG meta-analysis. Even among ER+ patients treated with tamoxifen, WBRT still decreases the risk of ipsilateral breast tumor (from ~ 16.5% with tamoxifen alone to 9% with radiation alone to 2.8% with tamoxifen + radiation according to NSABP B-21).      We also discussed our recommendation for regional lm irradiation given her node positive breast cancer with multiple LN involved. There are at least two trials investigating the addition of regional lm irradiation in the setting of high-risk disease, MA.20 and EORTC 65960. Both of these trials included patients with node-positive disease or with high-risk node-negative disease. Both of these trials showed an improvement in disease-free survival with the addition of regional lm irradiation, although this did not impact overall survival. In the MA.20 trial, this also improved local recurrence and distant metastases. Patients with hormone-positive disease who received hormonal therapy and chemotherapy appeared to be more likely to benefit from regional lm irradiation in the EORTC 64154 trial. These trials included radiation to the level 3/undissected axilla, supraclavicular nodes, and internal mammary nodes. We discussed that approximately 14% of the patients of the START trials received lm irradiation, and that there is an older [place] trial where patients received hypofractionated lm irradiation. Finally, we discussed the recently presented study of Radiation  Fractionation on Patient Outcomes After Breast REConstruction (FABREC) trial. This 400 patient randomized trial compared standard 6-7 week radiation with a shorter 3-4 week course. Both the accelerated and standard courses of treatment were equally effective at preventing the cancer from returning and had the same level of side effects. The patients in the shorter course treatment arm experienced fewer treatment disruptions and a lower financial burden as well. Thus, we feel comfortable delivering regional lm irradiation in a hypofractionated fashion. Additionally, we discussed that long term follow up of the trials discussed above (and others) suggest that patients may even have decreased acute toxicity and improved cosmesis with hypofractionation. After a thorough discussion of the above, the patient elected for hypofractionation.     Given her young age, close margins, high grade disease and DCIS, we would also recommend tumor bed boost, which has been shown to improve local control in patients < 50, high-grade tumors. or positive margins (EORTC, [facility]).      Finally, we discussed the side effects and risk of WBRT, including the need for CT simulation, tattoo placement, and daily radiation (M-F) x 20 days. We discussed the side effects and risks of radiation, including fatigue, radiation dermatitis, and breast pain in the short-term; skin fibrosis, pigmentation, chest wall pain, damage to underlying lung, rib, or heart, and secondary malignancies in the long-term. All questions were answered to the best of our ability and informed consent was obtained.     PLAN:  - complete adjuvant chemotherapy on June 24  - Radiation to start soon thereafter. Plan for 42.56 Gy in 16 fractions WBRT and RNI followed by 10 Gy boost in 5 fractions to tumor bed  - plan to simulate supine with ABC to minimize heart and lung dose given her young age, no bolus, do plan boost, plan for CT simulation on June 25, 2024 with treatment  to start shortly thereafter.  She would like to finish treatment by the time  starts in August.    NCCN Guidelines were applicable to guide this patients treatment plan.     Gómez Richter MD PhD   Professor, Radiation Oncology  5/7/2024    Over 60 minutes was spent in evaluating, counseling, and examining the patient. More than 50% of that time was spent in face to face discussion.

## 2024-05-07 ENCOUNTER — HOSPITAL ENCOUNTER (OUTPATIENT)
Dept: RADIATION ONCOLOGY | Facility: CLINIC | Age: 39
Setting detail: RADIATION/ONCOLOGY SERIES
Discharge: HOME | End: 2024-05-07
Payer: COMMERCIAL

## 2024-05-07 VITALS
TEMPERATURE: 97.2 F | DIASTOLIC BLOOD PRESSURE: 96 MMHG | BODY MASS INDEX: 35.16 KG/M2 | OXYGEN SATURATION: 100 % | RESPIRATION RATE: 18 BRPM | SYSTOLIC BLOOD PRESSURE: 161 MMHG | WEIGHT: 217.81 LBS | HEART RATE: 96 BPM

## 2024-05-07 DIAGNOSIS — E03.9 HYPOTHYROIDISM, UNSPECIFIED TYPE: ICD-10-CM

## 2024-05-07 DIAGNOSIS — F41.9 ANXIETY AND DEPRESSION: ICD-10-CM

## 2024-05-07 DIAGNOSIS — C50.411 MALIGNANT NEOPLASM OF UPPER-OUTER QUADRANT OF RIGHT BREAST IN FEMALE, ESTROGEN RECEPTOR POSITIVE (MULTI): Primary | ICD-10-CM

## 2024-05-07 DIAGNOSIS — Z17.0 MALIGNANT NEOPLASM OF UPPER-OUTER QUADRANT OF RIGHT BREAST IN FEMALE, ESTROGEN RECEPTOR POSITIVE (MULTI): Primary | ICD-10-CM

## 2024-05-07 DIAGNOSIS — R53.82 CHRONIC FATIGUE: ICD-10-CM

## 2024-05-07 DIAGNOSIS — F32.0 DEPRESSION, MAJOR, SINGLE EPISODE, MILD (CMS-HCC): ICD-10-CM

## 2024-05-07 DIAGNOSIS — R59.0 AXILLARY LYMPHADENOPATHY: ICD-10-CM

## 2024-05-07 DIAGNOSIS — F32.A ANXIETY AND DEPRESSION: ICD-10-CM

## 2024-05-07 DIAGNOSIS — E66.9 CLASS 2 OBESITY: ICD-10-CM

## 2024-05-07 PROCEDURE — 99215 OFFICE O/P EST HI 40 MIN: CPT | Performed by: RADIOLOGY

## 2024-05-07 PROCEDURE — 99205 OFFICE O/P NEW HI 60 MIN: CPT | Performed by: RADIOLOGY

## 2024-05-07 ASSESSMENT — ENCOUNTER SYMPTOMS
CONSTIPATION: 1
NAUSEA: 1
HEMATOLOGIC/LYMPHATIC NEGATIVE: 1
APPETITE CHANGE: 1
RESPIRATORY NEGATIVE: 1
EYES NEGATIVE: 1
NERVOUS/ANXIOUS: 1
MUSCULOSKELETAL NEGATIVE: 1
NEUROLOGICAL NEGATIVE: 1
CARDIOVASCULAR NEGATIVE: 1

## 2024-05-07 ASSESSMENT — PAIN SCALES - GENERAL: PAINLEVEL: 0-NO PAIN

## 2024-05-07 NOTE — PROGRESS NOTES
Radiation Oncology Nursing Note    Prior Radiotherapy:  No  No treatments are associated with the selected episodes (no episode in context).     Current Systemic Treatment:  Yes, describe: Docetaxel/Cyclophosphamide. Completed 1/4 cycles, last chemo 6/24/2024     Presence of Pacemaker or ICD:  No    History of Autoimmune or Connective Tissue Disorders:  No    Pain: The patient's current pain level was assessed.  They report currently having a pain of 0 out of 10.  They feel their pain is under control without the use of pain medications.    Review of Systems:  Review of Systems   Constitutional:  Positive for appetite change (due to chemo side effects).   HENT:  Negative.     Eyes: Negative.    Respiratory: Negative.     Cardiovascular: Negative.    Gastrointestinal:  Positive for constipation and nausea.   Genitourinary: Negative.     Musculoskeletal: Negative.    Skin:         Burn to left forearm from hot pack to raise vein before chemo    Neurological: Negative.    Hematological: Negative.    Psychiatric/Behavioral:  The patient is nervous/anxious (baseline anxiety, increased since recent diagnosis).

## 2024-05-08 ENCOUNTER — TELEPHONE (OUTPATIENT)
Dept: HEMATOLOGY/ONCOLOGY | Facility: CLINIC | Age: 39
End: 2024-05-08
Payer: COMMERCIAL

## 2024-05-08 ENCOUNTER — LAB (OUTPATIENT)
Dept: LAB | Facility: CLINIC | Age: 39
End: 2024-05-08
Payer: COMMERCIAL

## 2024-05-08 DIAGNOSIS — E03.9 ACQUIRED HYPOTHYROIDISM: ICD-10-CM

## 2024-05-08 DIAGNOSIS — F41.9 ANXIETY AND DEPRESSION: ICD-10-CM

## 2024-05-08 DIAGNOSIS — E53.8 VITAMIN B12 DEFICIENCY: ICD-10-CM

## 2024-05-08 DIAGNOSIS — Z17.0 MALIGNANT NEOPLASM OF UPPER-OUTER QUADRANT OF RIGHT BREAST IN FEMALE, ESTROGEN RECEPTOR POSITIVE (MULTI): ICD-10-CM

## 2024-05-08 DIAGNOSIS — E55.9 VITAMIN D DEFICIENCY: ICD-10-CM

## 2024-05-08 DIAGNOSIS — C50.411 MALIGNANT NEOPLASM OF UPPER-OUTER QUADRANT OF RIGHT BREAST IN FEMALE, ESTROGEN RECEPTOR POSITIVE (MULTI): ICD-10-CM

## 2024-05-08 DIAGNOSIS — F32.A ANXIETY AND DEPRESSION: ICD-10-CM

## 2024-05-08 LAB
25(OH)D3 SERPL-MCNC: 37 NG/ML (ref 30–100)
ALBUMIN SERPL BCP-MCNC: 4.1 G/DL (ref 3.4–5)
ALP SERPL-CCNC: 79 U/L (ref 33–110)
ALT SERPL W P-5'-P-CCNC: 22 U/L (ref 7–45)
ANION GAP SERPL CALC-SCNC: 9 MMOL/L (ref 10–20)
AST SERPL W P-5'-P-CCNC: 14 U/L (ref 9–39)
B-HCG SERPL-ACNC: <2 MIU/ML
BASOPHILS # BLD AUTO: 0.04 X10*3/UL (ref 0–0.1)
BASOPHILS NFR BLD AUTO: 0.7 %
BILIRUB SERPL-MCNC: 0.3 MG/DL (ref 0–1.2)
BUN SERPL-MCNC: 14 MG/DL (ref 6–23)
CALCIUM SERPL-MCNC: 9.4 MG/DL (ref 8.6–10.3)
CHLORIDE SERPL-SCNC: 102 MMOL/L (ref 98–107)
CO2 SERPL-SCNC: 29 MMOL/L (ref 21–32)
CREAT SERPL-MCNC: 0.71 MG/DL (ref 0.5–1.05)
EGFRCR SERPLBLD CKD-EPI 2021: >90 ML/MIN/1.73M*2
EOSINOPHIL # BLD AUTO: 0.03 X10*3/UL (ref 0–0.7)
EOSINOPHIL NFR BLD AUTO: 0.5 %
ERYTHROCYTE [DISTWIDTH] IN BLOOD BY AUTOMATED COUNT: 13 % (ref 11.5–14.5)
GLUCOSE SERPL-MCNC: 92 MG/DL (ref 74–99)
HCT VFR BLD AUTO: 34.2 % (ref 36–46)
HGB BLD-MCNC: 11.3 G/DL (ref 12–16)
IMM GRANULOCYTES # BLD AUTO: 0.04 X10*3/UL (ref 0–0.7)
IMM GRANULOCYTES NFR BLD AUTO: 0.7 % (ref 0–0.9)
LYMPHOCYTES # BLD AUTO: 1.89 X10*3/UL (ref 1.2–4.8)
LYMPHOCYTES NFR BLD AUTO: 31.2 %
MCH RBC QN AUTO: 29 PG (ref 26–34)
MCHC RBC AUTO-ENTMCNC: 33 G/DL (ref 32–36)
MCV RBC AUTO: 88 FL (ref 80–100)
MONOCYTES # BLD AUTO: 0.38 X10*3/UL (ref 0.1–1)
MONOCYTES NFR BLD AUTO: 6.3 %
NEUTROPHILS # BLD AUTO: 3.67 X10*3/UL (ref 1.2–7.7)
NEUTROPHILS NFR BLD AUTO: 60.6 %
PLATELET # BLD AUTO: 225 X10*3/UL (ref 150–450)
POTASSIUM SERPL-SCNC: 3.9 MMOL/L (ref 3.5–5.3)
PROT SERPL-MCNC: 7.7 G/DL (ref 6.4–8.2)
RBC # BLD AUTO: 3.89 X10*6/UL (ref 4–5.2)
SODIUM SERPL-SCNC: 136 MMOL/L (ref 136–145)
TSH SERPL-ACNC: 1.66 MIU/L (ref 0.44–3.98)
VIT B12 SERPL-MCNC: >2000 PG/ML (ref 211–911)
WBC # BLD AUTO: 6.1 X10*3/UL (ref 4.4–11.3)

## 2024-05-08 PROCEDURE — 36415 COLL VENOUS BLD VENIPUNCTURE: CPT

## 2024-05-08 PROCEDURE — 84702 CHORIONIC GONADOTROPIN TEST: CPT

## 2024-05-08 PROCEDURE — 85025 COMPLETE CBC W/AUTO DIFF WBC: CPT

## 2024-05-08 PROCEDURE — 82607 VITAMIN B-12: CPT

## 2024-05-08 PROCEDURE — 82306 VITAMIN D 25 HYDROXY: CPT

## 2024-05-08 PROCEDURE — 84443 ASSAY THYROID STIM HORMONE: CPT

## 2024-05-08 PROCEDURE — 84075 ASSAY ALKALINE PHOSPHATASE: CPT

## 2024-05-08 NOTE — TELEPHONE ENCOUNTER
Spoke with patient on telephone, pt wanted to confirm dexamethasone dose for Sunday 5/12, day before cycle 2 treatment. Pt states she believes she is to take two tablets in AM and two tablets in PM. Medication order states AM only. Let patient know I will confirm medication instructions with Dr. Gallegos and call her back tomorrow. Pt verbalized understanding.

## 2024-05-09 ENCOUNTER — PATIENT MESSAGE (OUTPATIENT)
Dept: INTEGRATIVE MEDICINE | Facility: CLINIC | Age: 39
End: 2024-05-09
Payer: COMMERCIAL

## 2024-05-09 DIAGNOSIS — F32.0 DEPRESSION, MAJOR, SINGLE EPISODE, MILD (CMS-HCC): Primary | ICD-10-CM

## 2024-05-09 RX ORDER — SERTRALINE HYDROCHLORIDE 100 MG/1
100 TABLET, FILM COATED ORAL DAILY
Qty: 90 TABLET | Refills: 3 | Status: SHIPPED | OUTPATIENT
Start: 2024-05-09 | End: 2024-05-09 | Stop reason: SDUPTHER

## 2024-05-09 RX ORDER — SERTRALINE HYDROCHLORIDE 100 MG/1
100 TABLET, FILM COATED ORAL DAILY
Qty: 90 TABLET | Refills: 3 | Status: SHIPPED | OUTPATIENT
Start: 2024-05-09

## 2024-05-09 NOTE — TELEPHONE ENCOUNTER
Instructions provided to patient via [employer] message. See message sent today at 09:22.

## 2024-05-09 NOTE — PROGRESS NOTES
"Patient ID: Alice Rojo is a 38 y.o. female.  Cancer Staging   Malignant neoplasm of upper-outer quadrant of right breast in female, estrogen receptor positive (Multi)  Staging form: Breast, AJCC 8th Edition  - Clinical: Stage IB (cT1c, cN1, cM0, G2, ER+, MT+, HER2-) - Signed by Liliana Barrett DO on 2/6/2024  - Pathologic: Stage IB (pT1c, pN1(sn), cM0, G3, ER+, MT+, HER2-) - Signed by Liliana Barrett DO on 4/9/2024    Oncology History   Malignant neoplasm of upper-outer quadrant of right breast in female, estrogen receptor positive (Multi)   2/6/2024 Initial Diagnosis    Malignant neoplasm of upper-outer quadrant of right breast in female, estrogen receptor positive (CMS/HCC)     2/6/2024 Cancer Staged    Staging form: Breast, AJCC 8th Edition, Clinical: Stage IB (cT1c, cN1, cM0, G2, ER+, MT+, HER2-) - Signed by Liliana Barrett DO on 2/6/2024 4/9/2024 Cancer Staged    Staging form: Breast, AJCC 8th Edition, Pathologic: Stage IB (pT1c, pN1(sn), cM0, G3, ER+, MT+, HER2-) - Signed by Liliana Barrett DO on 4/9/2024 4/22/2024 -  Chemotherapy    TC (DOCEtaxel / Cyclophosphamide), 21 Day Cycles       Subjective    HPI  Ms. Luz Rojo \"Alfonzo" is a 39 y/o female presenting for follow-up for breast cancer. Consideration for cycle 2 TCHP.     Patient reports hair loss. She notes that she has sensitive skin and that her skin takes a while to heal. She has a skin condition that she is trying topical creams for. Patient states that she took Ativan this morning, but still feels a little anxious.     Patient mentions drinking half a cup of wine and didn't feel well after it. She reports vomiting the next day. She is currently on a high dose of vitamin B12 and mentions that her mother has hereditary vitamin B12 deficiency for which she takes B12 shots.     Patient's past medical history, surgical history, family history and social history reviewed.    Objective    Vitals:    05/13/24 0951   BP: 155/80   Pulse: " (!) 122   Resp: 16   Temp: 36.5 °C (97.7 °F)   SpO2: 100%         Review of Systems:   Review of Systems:    Positive per HPI, otherwise negative.    Physical Exam  Gen: appears well in clinic, NAD  HEENT: atraumatic head, normocephalic, EOMI, conjunctiva normal  LUNG: no increased WOB, CTAB  CV: No JVD. RRR  GI: soft, NT, ND  LE: no LE edema  Skin: no obvious rashes or lesions on visible skin  Neuro: interactive, no focal deficits noted  Psych: normal mood and affect  Performance Status:  Symptomatic; fully ambulatory    Labs/Imaging/Pathology: personally reviewed reports and images in Epic electronic medical record system. Pertinent results as it related to the plan represented in below in assessment and plan.   Assessment/Plan   1.Right breast IDC, stage IB ypT1c (20mm)pN1 (2/6LNs) M0, ER 60-70%, AR 80-90% , HER 2 IHC 1+  - initially diagnosed after abnormal mammogram first noted 1/24/2024 at [facility]  - Follow-up diagnostic imaging on 1/26/2024 reveals a spiculated mass at 10:30 10cmFN measuring 2cm on imaging.  There are also 2 abnormal axillary lymph nodes  - 1/30/2024 core bx of right breast mass path and LN revealed IDC grade 2 and axillary node bx same day revealed metastatic breast carciboma. ER 60-70%, AR 80-90% , HER 2 IHC 1+  - staging scans with CT c/a/p and bone scan 2/6/24 were negative.  - 3/22/24 right breast partial mastectomy path revealed 20 mm focus of invasive cancer, G3, along with Grade 3 DCIS. Margins negative. 2 out of 6 lymph nodes positive. Final staging pT1c pN1a.     - Today we discussed the role of adjuvant chemotherapy given higher clinical risk factors with size of tumor and node positive disease in setting of premenopausal state  - We did discuss there is a role for anthracycline-based therapy given her premenopausal status and node positive disease putting her at higher risk per the ABC trials (Rashawn, et al JCO 2017) we discussed with hormone positive being lower risk,  the benefit from  anthracyclines compared to taxotere plus cytoxan is less clear and after reviewing side effects from both, patient is hesitant about the potential side effects from anthracycline especially as she has hx of hyperemesis along with concern about secondary leukemia  - we discussed Taxotere plus cytoxan is a very reasonable alternative  - We also reviewed that much of the benefit of chemotherapy comes from the ovarian suppression affect and she is open to ovarian suppression long-term.  - 4/22/24 C1D1  Taxotere plus cytoxan included Emend pre-med in addition to dexamethasone 3 days post and the night before due to her concern for hyperemesis (as she had with pregnancy)  - cooling cap      5/13/24:  - overall tolerated C1 very well with minimal toxicity, reports minimal nausea  - does have some trouble with sleep and will decrease dex to daily after treatment to see if still manageable  - changes in dose or premeds today  - No contraindications to proceed with cycle 2  - Will continue Ativan for anxiety as premed  - Patient is tolerating treatment   - We discussed that there will be no dose adjustment at this time   - Patient would like to cancel day 8 visits and call the office PRN.   - Patient has no other major complaints at this time   - RTC in 3 weeks for cycle 3      Reviewed ongoing medical problems and how they relate to her breast cancer, will continue long term monitoring.    RTC in 3 weeks for cycle 3. This note has been transcribed using a medical scribe and there is a possibility of unintentional typing misprints.   Diagnoses and all orders for this visit:  Malignant neoplasm of upper-outer quadrant of right breast in female, estrogen receptor positive (Multi)  -     Clinic Appointment Request  Other orders  -     LORazepam (Ativan) injection 0.5 mg  -     LORazepam (Ativan) injection 0.5 mg     Time Spent  Prep time on day of patient encounter: 5 minutes  Time spent directly with patient, family or  caregiver: 25 minutes  Additional Time Spent on Patient Care Activities: 7 minutes  Documentation Time: 5 minutes  Other Time Spent: 0 minutes  Total: 42 minutes        Ban Gallegos MD  Hematology/Oncology  [facility]    [employer] Attestation  By signing my name below, IVera Scribe   attest that this documentation has been prepared under the direction and in the presence of Ban Gallegos MD.

## 2024-05-13 ENCOUNTER — INFUSION (OUTPATIENT)
Dept: HEMATOLOGY/ONCOLOGY | Facility: CLINIC | Age: 39
End: 2024-05-13
Payer: COMMERCIAL

## 2024-05-13 ENCOUNTER — OFFICE VISIT (OUTPATIENT)
Dept: HEMATOLOGY/ONCOLOGY | Facility: CLINIC | Age: 39
End: 2024-05-13
Payer: COMMERCIAL

## 2024-05-13 VITALS
DIASTOLIC BLOOD PRESSURE: 80 MMHG | RESPIRATION RATE: 16 BRPM | OXYGEN SATURATION: 100 % | BODY MASS INDEX: 35.97 KG/M2 | SYSTOLIC BLOOD PRESSURE: 155 MMHG | TEMPERATURE: 97.7 F | WEIGHT: 222.88 LBS | HEART RATE: 122 BPM

## 2024-05-13 DIAGNOSIS — C50.411 MALIGNANT NEOPLASM OF UPPER-OUTER QUADRANT OF RIGHT BREAST IN FEMALE, ESTROGEN RECEPTOR POSITIVE (MULTI): ICD-10-CM

## 2024-05-13 DIAGNOSIS — Z17.0 MALIGNANT NEOPLASM OF UPPER-OUTER QUADRANT OF RIGHT BREAST IN FEMALE, ESTROGEN RECEPTOR POSITIVE (MULTI): ICD-10-CM

## 2024-05-13 PROCEDURE — 96417 CHEMO IV INFUS EACH ADDL SEQ: CPT

## 2024-05-13 PROCEDURE — 96361 HYDRATE IV INFUSION ADD-ON: CPT | Mod: INF

## 2024-05-13 PROCEDURE — 96375 TX/PRO/DX INJ NEW DRUG ADDON: CPT | Mod: INF

## 2024-05-13 PROCEDURE — 99215 OFFICE O/P EST HI 40 MIN: CPT | Performed by: INTERNAL MEDICINE

## 2024-05-13 PROCEDURE — G2211 COMPLEX E/M VISIT ADD ON: HCPCS | Performed by: INTERNAL MEDICINE

## 2024-05-13 PROCEDURE — 96367 TX/PROPH/DG ADDL SEQ IV INF: CPT

## 2024-05-13 PROCEDURE — 2500000004 HC RX 250 GENERAL PHARMACY W/ HCPCS (ALT 636 FOR OP/ED): Performed by: INTERNAL MEDICINE

## 2024-05-13 PROCEDURE — 96413 CHEMO IV INFUSION 1 HR: CPT

## 2024-05-13 PROCEDURE — 2500000004 HC RX 250 GENERAL PHARMACY W/ HCPCS (ALT 636 FOR OP/ED): Mod: JZ | Performed by: INTERNAL MEDICINE

## 2024-05-13 PROCEDURE — 96377 APPLICATON ON-BODY INJECTOR: CPT

## 2024-05-13 PROCEDURE — 96360 HYDRATION IV INFUSION INIT: CPT | Mod: INF

## 2024-05-13 RX ORDER — HEPARIN 100 UNIT/ML
500 SYRINGE INTRAVENOUS AS NEEDED
OUTPATIENT
Start: 2024-05-13

## 2024-05-13 RX ORDER — PALONOSETRON 0.05 MG/ML
0.25 INJECTION, SOLUTION INTRAVENOUS ONCE
Status: COMPLETED | OUTPATIENT
Start: 2024-05-13 | End: 2024-05-13

## 2024-05-13 RX ORDER — EPINEPHRINE 0.3 MG/.3ML
0.3 INJECTION SUBCUTANEOUS EVERY 5 MIN PRN
Status: DISCONTINUED | OUTPATIENT
Start: 2024-05-13 | End: 2024-05-13 | Stop reason: HOSPADM

## 2024-05-13 RX ORDER — FAMOTIDINE 10 MG/ML
20 INJECTION INTRAVENOUS ONCE AS NEEDED
Status: DISCONTINUED | OUTPATIENT
Start: 2024-05-13 | End: 2024-05-13 | Stop reason: HOSPADM

## 2024-05-13 RX ORDER — LORAZEPAM 2 MG/ML
0.5 INJECTION INTRAMUSCULAR EVERY 4 HOURS PRN
Status: CANCELLED | OUTPATIENT
Start: 2024-06-03

## 2024-05-13 RX ORDER — DIPHENHYDRAMINE HYDROCHLORIDE 50 MG/ML
50 INJECTION INTRAMUSCULAR; INTRAVENOUS AS NEEDED
Status: DISCONTINUED | OUTPATIENT
Start: 2024-05-13 | End: 2024-05-13 | Stop reason: HOSPADM

## 2024-05-13 RX ORDER — ALBUTEROL SULFATE 0.83 MG/ML
3 SOLUTION RESPIRATORY (INHALATION) AS NEEDED
Status: DISCONTINUED | OUTPATIENT
Start: 2024-05-13 | End: 2024-05-13 | Stop reason: HOSPADM

## 2024-05-13 RX ORDER — LORAZEPAM 2 MG/ML
0.5 INJECTION INTRAMUSCULAR EVERY 4 HOURS PRN
Status: CANCELLED | OUTPATIENT
Start: 2024-05-13

## 2024-05-13 RX ORDER — PROCHLORPERAZINE MALEATE 10 MG
10 TABLET ORAL EVERY 6 HOURS PRN
Status: DISCONTINUED | OUTPATIENT
Start: 2024-05-13 | End: 2024-05-13 | Stop reason: HOSPADM

## 2024-05-13 RX ORDER — PROCHLORPERAZINE EDISYLATE 5 MG/ML
10 INJECTION INTRAMUSCULAR; INTRAVENOUS EVERY 6 HOURS PRN
Status: DISCONTINUED | OUTPATIENT
Start: 2024-05-13 | End: 2024-05-13 | Stop reason: HOSPADM

## 2024-05-13 RX ORDER — LORAZEPAM 2 MG/ML
0.5 INJECTION INTRAMUSCULAR EVERY 4 HOURS PRN
Status: DISCONTINUED | OUTPATIENT
Start: 2024-05-13 | End: 2024-05-13 | Stop reason: HOSPADM

## 2024-05-13 RX ORDER — HEPARIN SODIUM,PORCINE/PF 10 UNIT/ML
50 SYRINGE (ML) INTRAVENOUS AS NEEDED
OUTPATIENT
Start: 2024-05-13

## 2024-05-13 RX ORDER — LORAZEPAM 2 MG/ML
0.5 INJECTION INTRAMUSCULAR EVERY 4 HOURS PRN
OUTPATIENT
Start: 2024-06-24

## 2024-05-13 RX ADMIN — DEXAMETHASONE SODIUM PHOSPHATE 16 MG: 10 INJECTION, SOLUTION INTRAMUSCULAR; INTRAVENOUS at 12:18

## 2024-05-13 RX ADMIN — DIPHENHYDRAMINE HYDROCHLORIDE 50 MG: 50 INJECTION, SOLUTION INTRAMUSCULAR; INTRAVENOUS at 12:37

## 2024-05-13 RX ADMIN — SODIUM CHLORIDE 500 ML: 9 INJECTION, SOLUTION INTRAVENOUS at 12:54

## 2024-05-13 RX ADMIN — LORAZEPAM 0.5 MG: 2 INJECTION, SOLUTION INTRAMUSCULAR; INTRAVENOUS at 11:52

## 2024-05-13 RX ADMIN — PEGFILGRASTIM 6 MG: KIT SUBCUTANEOUS at 14:55

## 2024-05-13 RX ADMIN — FOSAPREPITANT 150 MG: 150 INJECTION, POWDER, LYOPHILIZED, FOR SOLUTION INTRAVENOUS at 12:55

## 2024-05-13 RX ADMIN — CYCLOPHOSPHAMIDE 1296 MG: 1 INJECTION, POWDER, FOR SOLUTION INTRAVENOUS; ORAL at 14:54

## 2024-05-13 RX ADMIN — PALONOSETRON HYDROCHLORIDE 250 MCG: 0.25 INJECTION INTRAVENOUS at 11:53

## 2024-05-13 RX ADMIN — DOCETAXEL 160 MG: 20 INJECTION, SOLUTION, CONCENTRATE INTRAVENOUS at 13:38

## 2024-05-13 ASSESSMENT — PAIN SCALES - GENERAL: PAINLEVEL: 0-NO PAIN

## 2024-05-20 ENCOUNTER — APPOINTMENT (OUTPATIENT)
Dept: INTEGRATIVE MEDICINE | Facility: CLINIC | Age: 39
End: 2024-05-20
Payer: COMMERCIAL

## 2024-05-20 ENCOUNTER — APPOINTMENT (OUTPATIENT)
Dept: HEMATOLOGY/ONCOLOGY | Facility: CLINIC | Age: 39
End: 2024-05-20
Payer: COMMERCIAL

## 2024-05-22 ENCOUNTER — PATIENT MESSAGE (OUTPATIENT)
Dept: INTEGRATIVE MEDICINE | Facility: CLINIC | Age: 39
End: 2024-05-22
Payer: COMMERCIAL

## 2024-05-28 ENCOUNTER — TELEPHONE (OUTPATIENT)
Dept: HEMATOLOGY/ONCOLOGY | Facility: CLINIC | Age: 39
End: 2024-05-28
Payer: COMMERCIAL

## 2024-05-28 NOTE — TELEPHONE ENCOUNTER
"Per Ashok Miller:  let Alice sheets know that she should get into see her dentist ASAP but before any work is done they should call us to discuss what is needed first please       Spoke with the patient. Provided update from Ashok above. Pt states she thinks she lost her crown.\" Has a dentist appt this Thrusday at 12pm. Pt verbalized understanding and had no further questions or concerns at this time.   "

## 2024-05-28 NOTE — TELEPHONE ENCOUNTER
----- Message from Luz Rojo sent at 5/28/2024 11:57 AM EDT -----  Regarding: Tooth came out  Contact: [phone]  Hello,  Am I allowed to have dental work during chemo? My last, far back molar just came out. Could it be from the chemo? Should I get it fixed right away? Thank you!

## 2024-05-31 ENCOUNTER — PATIENT MESSAGE (OUTPATIENT)
Dept: HEMATOLOGY/ONCOLOGY | Facility: CLINIC | Age: 39
End: 2024-05-31

## 2024-05-31 ENCOUNTER — ALLIED HEALTH (OUTPATIENT)
Dept: INTEGRATIVE MEDICINE | Facility: CLINIC | Age: 39
End: 2024-05-31
Payer: COMMERCIAL

## 2024-05-31 ENCOUNTER — LAB (OUTPATIENT)
Dept: LAB | Facility: CLINIC | Age: 39
End: 2024-05-31
Payer: COMMERCIAL

## 2024-05-31 DIAGNOSIS — T45.1X5A CHEMOTHERAPY-INDUCED NAUSEA: Primary | ICD-10-CM

## 2024-05-31 DIAGNOSIS — R11.0 CHEMOTHERAPY-INDUCED NAUSEA: Primary | ICD-10-CM

## 2024-05-31 DIAGNOSIS — Z17.0 MALIGNANT NEOPLASM OF UPPER-OUTER QUADRANT OF RIGHT BREAST IN FEMALE, ESTROGEN RECEPTOR POSITIVE (MULTI): ICD-10-CM

## 2024-05-31 DIAGNOSIS — C50.411 MALIGNANT NEOPLASM OF UPPER-OUTER QUADRANT OF RIGHT BREAST IN FEMALE, ESTROGEN RECEPTOR POSITIVE (MULTI): ICD-10-CM

## 2024-05-31 DIAGNOSIS — M54.2 NECK PAIN: ICD-10-CM

## 2024-05-31 LAB
ALBUMIN SERPL BCP-MCNC: 4.3 G/DL (ref 3.4–5)
ALP SERPL-CCNC: 82 U/L (ref 33–110)
ALT SERPL W P-5'-P-CCNC: 48 U/L (ref 7–45)
ANION GAP SERPL CALC-SCNC: 11 MMOL/L (ref 10–20)
AST SERPL W P-5'-P-CCNC: 20 U/L (ref 9–39)
B-HCG SERPL-ACNC: <2 MIU/ML
BASOPHILS # BLD AUTO: 0.03 X10*3/UL (ref 0–0.1)
BASOPHILS NFR BLD AUTO: 0.8 %
BILIRUB SERPL-MCNC: 0.3 MG/DL (ref 0–1.2)
BUN SERPL-MCNC: 15 MG/DL (ref 6–23)
CALCIUM SERPL-MCNC: 9.4 MG/DL (ref 8.6–10.3)
CHLORIDE SERPL-SCNC: 103 MMOL/L (ref 98–107)
CO2 SERPL-SCNC: 28 MMOL/L (ref 21–32)
CREAT SERPL-MCNC: 0.68 MG/DL (ref 0.5–1.05)
EGFRCR SERPLBLD CKD-EPI 2021: >90 ML/MIN/1.73M*2
EOSINOPHIL # BLD AUTO: 0.02 X10*3/UL (ref 0–0.7)
EOSINOPHIL NFR BLD AUTO: 0.5 %
ERYTHROCYTE [DISTWIDTH] IN BLOOD BY AUTOMATED COUNT: 14.1 % (ref 11.5–14.5)
GLUCOSE SERPL-MCNC: 97 MG/DL (ref 74–99)
HCT VFR BLD AUTO: 36 % (ref 36–46)
HGB BLD-MCNC: 11.8 G/DL (ref 12–16)
IMM GRANULOCYTES # BLD AUTO: 0.02 X10*3/UL (ref 0–0.7)
IMM GRANULOCYTES NFR BLD AUTO: 0.5 % (ref 0–0.9)
LYMPHOCYTES # BLD AUTO: 1.04 X10*3/UL (ref 1.2–4.8)
LYMPHOCYTES NFR BLD AUTO: 26.4 %
MCH RBC QN AUTO: 29.6 PG (ref 26–34)
MCHC RBC AUTO-ENTMCNC: 32.8 G/DL (ref 32–36)
MCV RBC AUTO: 90 FL (ref 80–100)
MONOCYTES # BLD AUTO: 0.36 X10*3/UL (ref 0.1–1)
MONOCYTES NFR BLD AUTO: 9.1 %
NEUTROPHILS # BLD AUTO: 2.47 X10*3/UL (ref 1.2–7.7)
NEUTROPHILS NFR BLD AUTO: 62.7 %
PLATELET # BLD AUTO: 194 X10*3/UL (ref 150–450)
POTASSIUM SERPL-SCNC: 4.1 MMOL/L (ref 3.5–5.3)
PROT SERPL-MCNC: 7.7 G/DL (ref 6.4–8.2)
RBC # BLD AUTO: 3.99 X10*6/UL (ref 4–5.2)
SODIUM SERPL-SCNC: 138 MMOL/L (ref 136–145)
WBC # BLD AUTO: 3.9 X10*3/UL (ref 4.4–11.3)

## 2024-05-31 PROCEDURE — 85025 COMPLETE CBC W/AUTO DIFF WBC: CPT

## 2024-05-31 PROCEDURE — 36415 COLL VENOUS BLD VENIPUNCTURE: CPT

## 2024-05-31 PROCEDURE — 97810 ACUP 1/> WO ESTIM 1ST 15 MIN: CPT

## 2024-05-31 PROCEDURE — 80053 COMPREHEN METABOLIC PANEL: CPT

## 2024-05-31 PROCEDURE — 99202 OFFICE O/P NEW SF 15 MIN: CPT

## 2024-05-31 PROCEDURE — 84702 CHORIONIC GONADOTROPIN TEST: CPT

## 2024-05-31 PROCEDURE — 97811 ACUP 1/> W/O ESTIM EA ADD 15: CPT

## 2024-05-31 NOTE — PROGRESS NOTES
"Acupuncture Visit:     Subjective   Patient ID: Alice Rojo is a 38 y.o. female who presents for Chemo Related Symptoms and Neck Pain    [facility]  [facility]  Initial date: 05/31/24  Referring physician: Dr. Elliott    CC: Chemo related sx including nausea, constipation and musculoskeletal pain and soreness    Initial Visit: 05/31/24    Patient referred by Dr. Smalls for the support of chemo related sx along with musculoskeletal pain related to ongoing cancer treatment.  Patient reports she has been experiencing a lot of muscle and bone soreness that's constant but manageable although it affects her sleep as she feels it at night.  She also endorse constipation and nausea.      She has completed 2 of 4 rounds of chemo so far.  Last round is scheduled at end of June.  Nausea has been managed by medication but believes it may be causing her constipation.    Onset:  End of February had a biopsy which confirmed breast cancer.  Chemo started in April and radiation to follow at the conclusion on chemo.  All sx started after chemotherapy.  Also experiences fatigue daily.    Location:  Back of neck bilaterally but not always at the same time    Duration:  Depends on chemotherapy schedule; all sx worse during and week immediately after chemotherapy    Frequency:  Constipation full week of treatment and following week after treatment.  All meds not working.  Fatigue worse during week of Chemo    Characteristics:  Fatigue   Neck Pain - dull throbbing, achy and sharp (varies over a couple of days)    Alleviates:  Clariton (newly discovered as a beneficial to neck pain from chemo)  Occasion Advil    Aggravates:   Chemo during week of or just after    Related sx:  Disrupts sleep     Tx methods:  Rx meds  OTC meds  NO therapies at the moment    Pain severity:  Worse - 6-7/10  Normal - 3/10  Today - 2-3/10    Feeling (emotions or fatigue):  Fatigue  Feeling \"ok\" on Zoloft and buse bar to help    Other relevant:  Dx breast " "cancer    Goal for acupuncture treatment:  \"Just sx management\"          Session Information  Is this acupuncture treatment being billed to the patient's insurance company: Yes  This is visit number: 1  The patient has a total number of visits of: 25  Initial Acupuncture Treatment date: 05/31/24  Name of Insurance Company: [employer]  Visit Type: New patient  Medical History Reviewed: I have reviewed pertinent medical history in EHR, and no contraindications are present to provide treatment         Review of Systems         Provider reviewed plan for the acupuncture session, precautions and contraindications. Patient/guardian/hospital staff has given consent to treat with full understanding of what to expect during the session. Before acupuncture began, provider explained to the patient to communicate at any time if the procedure was causing discomfort past their tolerance level. Patient agreed to advise acupuncturist. The acupuncturist counseled the patient on the risks of acupuncture treatment including pain, infection, bleeding, and no relief of pain. The patient was positioned comfortably. There was no evidence of infection at the site of needle insertions.    Objective   Physical Exam         Treatment Plan  Treatment Goals: Pain management, Nausea reduction, Wellbeing improvement, Coping    Acupuncture Treatment  Patient Position: Supine on a table  Acupuncture Needling: Yes  Needle Guage: 36 guage /.20/ Blue seirin  Body Points: With retention  Body Points - Bilateral: GV20; GB20,41; LU7; CV6,12; ST25,36; 4 benz; SP6; KD6  Auricular Points: No  Electroacupuncture Used: No  Needle Count In: 21  Needle Count Out: 21  Needle Retention Time (min): 30 minutes  Total Face to Face Time (min): 30 minutes              Assessment/Plan   Diagnoses and all orders for this visit:  Chemotherapy-induced nausea  Neck pain      "

## 2024-05-31 NOTE — PROGRESS NOTES
"Patient ID: Alice Rojo is a 38 y.o. female.  Cancer Staging   Malignant neoplasm of upper-outer quadrant of right breast in female, estrogen receptor positive (Multi)  Staging form: Breast, AJCC 8th Edition  - Clinical: Stage IB (cT1c, cN1, cM0, G2, ER+, OR+, HER2-) - Signed by Liliana Barrett DO on 2/6/2024  - Pathologic: Stage IB (pT1c, pN1(sn), cM0, G3, ER+, OR+, HER2-) - Signed by Liliana Barrett DO on 4/9/2024    Oncology History   Malignant neoplasm of upper-outer quadrant of right breast in female, estrogen receptor positive (Multi)   2/6/2024 Initial Diagnosis    Malignant neoplasm of upper-outer quadrant of right breast in female, estrogen receptor positive (CMS/HCC)     2/6/2024 Cancer Staged    Staging form: Breast, AJCC 8th Edition, Clinical: Stage IB (cT1c, cN1, cM0, G2, ER+, OR+, HER2-) - Signed by Liliana Barrett DO on 2/6/2024 4/9/2024 Cancer Staged    Staging form: Breast, AJCC 8th Edition, Pathologic: Stage IB (pT1c, pN1(sn), cM0, G3, ER+, OR+, HER2-) - Signed by Liliana Barrett DO on 4/9/2024 4/22/2024 -  Chemotherapy    TC (DOCEtaxel / Cyclophosphamide), 21 Day Cycles       Subjective    HPI  Ms. Luz Rojo \"Alfonzo" is a 37 y/o female presenting for follow-up for breast cancer. On cycle 3 of Taxotere and Cytoxan. Blood work on 5/31/24 remarkable for slight elevation ALT 48. CBC mild stable hemoglobinemia at 11.8.    She has some fatigue, decreased appetite, hair thinning. She has nausea that is manageable. She denies neuropathy, nail tenderness, new lumps or rashes. She reports constipation and takes miralax and dulcolax. Denies diarrhea. She thinks she will be painfully constipated for a week by Wednesday and will be for a week.     She states after the injection on 4/22/24 she had bone pain and has not had a period since. She notes this cycle was not as painful. She still takes Claritin.     She notes a lesion on her arm present during the last visit. She had ovarian cysts " and was diagnosed with PCOS in [facility]. Oncologist and scans showed a benign cyst, she is unsure of the diagnosis now.    Patient's past medical history, surgical history, family history and social history reviewed.    Review of Systems:   Review of Systems:    Positive per HPI, otherwise negative.     Objective      BP (!) 149/94   Pulse 93   Temp 36.2 °C (97.2 °F)   Resp 16   Wt 100 kg (221 lb 1.9 oz)   SpO2 97%   BMI 35.69 kg/m²      Physical Exam  Gen: appears well in clinic, NAD  HEENT: atraumatic head, normocephalic, EOMI, conjunctiva normal  LUNG: no increased WOB, CTAB  CV: No JVD. RRR  GI: soft, NT, ND  LE: no LE edema  Skin: no obvious rashes or lesions on visible skin  Neuro: interactive, no focal deficits noted  Psych: normal mood and affect    Performance Status:  Symptomatic; fully ambulatory    Labs/Imaging/Pathology: personally reviewed reports and images in Epic electronic medical record system. Pertinent results as it related to the plan represented in below in assessment and plan.     Assessment/Plan   1.Right breast IDC, stage IB ypT1c (20mm)pN1 (2/6LNs) M0, ER 60-70%, MI 80-90% , HER 2 IHC 1+  - initially diagnosed after abnormal mammogram first noted 1/24/2024 at [facility]  - Follow-up diagnostic imaging on 1/26/2024 reveals a spiculated mass at 10:30 10cmFN measuring 2cm on imaging.  There are also 2 abnormal axillary lymph nodes  - 1/30/2024 core bx of right breast mass path and LN revealed IDC grade 2 and axillary node bx same day revealed metastatic breast carciboma. ER 60-70%, MI 80-90% , HER 2 IHC 1+  - staging scans with CT c/a/p and bone scan 2/6/24 were negative.  - 3/22/24 right breast partial mastectomy path revealed 20 mm focus of invasive cancer, G3, along with Grade 3 DCIS. Margins negative. 2 out of 6 lymph nodes positive. Final staging pT1c pN1a.     - Today we discussed the role of adjuvant chemotherapy given higher clinical risk factors with size of tumor and node positive  disease in setting of premenopausal state  - We did discuss there is a role for anthracycline-based therapy given her premenopausal status and node positive disease putting her at higher risk per the ABC trials (Rashawn, et al JCO 2017) we discussed with hormone positive being lower risk,  the benefit from anthracyclines compared to taxotere plus cytoxan is less clear and after reviewing side effects from both, patient is hesitant about the potential side effects from anthracycline especially as she has hx of hyperemesis along with concern about secondary leukemia  - we discussed Taxotere plus cytoxan is a very reasonable alternative  - We also reviewed that much of the benefit of chemotherapy comes from the ovarian suppression affect and she is open to ovarian suppression long-term.  - 4/22/24 C1D1  Taxotere plus cytoxan included Emend pre-med in addition to dexamethasone 3 days post and the night before due to her concern for hyperemesis (as she had with pregnancy)  - cooling cap      5/13/24:  - overall tolerated C1 very well with minimal toxicity, reports minimal nausea  - does have some trouble with sleep and will decrease dex to daily after treatment to see if still manageable  - changes in dose or premeds today  - No contraindications to proceed with cycle 2  - Will continue Ativan for anxiety as premed  - Patient is tolerating treatment   - We discussed that there will be no dose adjustment at this time   - Patient would like to cancel day 8 visits and call the office PRN.   - Patient has no other major complaints at this time   - RTC in 3 weeks for cycle 3       6/3/24:   - Patient overall tolerated cycle 2 very well, her main complaint was constipation.   - Discuss with her and she can up titrate Miralax.  - Will also send a prescription for Lactulose to use if severe constipation.  - No neuropathy, no other GI complaints.  - No contraindications to proceed with cycle 3 today.  - Labs reviewed, slight  elevation in ALT is likely from the chemotherapy and we will monitor.  - RTC in three weeks for cycle 4    Reviewed ongoing medical problems and how they relate to her breast cancer, will continue long term monitoring.    RTC in three weeks for cycle 4 . This note has been transcribed using a medical scribe and there is a possibility of unintentional typing misprints.     Diagnoses and all orders for this visit:  Malignant neoplasm of upper-outer quadrant of right breast in female, estrogen receptor positive (Multi)  -     Clinic Appointment Request  -     lactulose 20 gram/30 mL oral solution; Take 15 mL (10 g) by mouth once daily.  Other orders  -     diphenhydrAMINE (BENADryl) 50 mg in sodium chloride 0.9% 50 mL IV  Ban Gallegos MD  Hematology/Oncology  [facility] at [facility]    Scribe Attestation  By signing my name below, IAshley Scribe attest that this documentation has been prepared under the direction and in the presence of Ban Gallegos MD.    Scribe Attestation  By signing my name below, Madeline GARCIA Scribe,   attest that this documentation has been prepared under the direction and in the presence of Ban Gallegos MD.

## 2024-06-03 ENCOUNTER — OFFICE VISIT (OUTPATIENT)
Dept: HEMATOLOGY/ONCOLOGY | Facility: CLINIC | Age: 39
End: 2024-06-03
Payer: COMMERCIAL

## 2024-06-03 ENCOUNTER — INFUSION (OUTPATIENT)
Dept: HEMATOLOGY/ONCOLOGY | Facility: CLINIC | Age: 39
End: 2024-06-03
Payer: COMMERCIAL

## 2024-06-03 ENCOUNTER — PATIENT MESSAGE (OUTPATIENT)
Dept: HEMATOLOGY/ONCOLOGY | Facility: CLINIC | Age: 39
End: 2024-06-03

## 2024-06-03 VITALS
DIASTOLIC BLOOD PRESSURE: 94 MMHG | HEART RATE: 93 BPM | RESPIRATION RATE: 16 BRPM | TEMPERATURE: 97.2 F | SYSTOLIC BLOOD PRESSURE: 149 MMHG | OXYGEN SATURATION: 97 % | WEIGHT: 221.12 LBS | BODY MASS INDEX: 35.69 KG/M2

## 2024-06-03 DIAGNOSIS — C50.411 MALIGNANT NEOPLASM OF UPPER-OUTER QUADRANT OF RIGHT BREAST IN FEMALE, ESTROGEN RECEPTOR POSITIVE (MULTI): ICD-10-CM

## 2024-06-03 DIAGNOSIS — Z17.0 MALIGNANT NEOPLASM OF UPPER-OUTER QUADRANT OF RIGHT BREAST IN FEMALE, ESTROGEN RECEPTOR POSITIVE (MULTI): ICD-10-CM

## 2024-06-03 PROCEDURE — 96367 TX/PROPH/DG ADDL SEQ IV INF: CPT

## 2024-06-03 PROCEDURE — 2500000004 HC RX 250 GENERAL PHARMACY W/ HCPCS (ALT 636 FOR OP/ED): Performed by: INTERNAL MEDICINE

## 2024-06-03 PROCEDURE — 96417 CHEMO IV INFUS EACH ADDL SEQ: CPT

## 2024-06-03 PROCEDURE — 96361 HYDRATE IV INFUSION ADD-ON: CPT | Mod: INF

## 2024-06-03 PROCEDURE — 96377 APPLICATON ON-BODY INJECTOR: CPT

## 2024-06-03 PROCEDURE — 96413 CHEMO IV INFUSION 1 HR: CPT

## 2024-06-03 PROCEDURE — 96375 TX/PRO/DX INJ NEW DRUG ADDON: CPT | Mod: INF

## 2024-06-03 PROCEDURE — 99215 OFFICE O/P EST HI 40 MIN: CPT | Performed by: INTERNAL MEDICINE

## 2024-06-03 PROCEDURE — G2211 COMPLEX E/M VISIT ADD ON: HCPCS | Performed by: INTERNAL MEDICINE

## 2024-06-03 RX ORDER — EPINEPHRINE 0.3 MG/.3ML
0.3 INJECTION SUBCUTANEOUS EVERY 5 MIN PRN
Status: DISCONTINUED | OUTPATIENT
Start: 2024-06-03 | End: 2024-06-03 | Stop reason: HOSPADM

## 2024-06-03 RX ORDER — PROCHLORPERAZINE MALEATE 10 MG
10 TABLET ORAL EVERY 6 HOURS PRN
Status: DISCONTINUED | OUTPATIENT
Start: 2024-06-03 | End: 2024-06-03 | Stop reason: HOSPADM

## 2024-06-03 RX ORDER — DIPHENHYDRAMINE HYDROCHLORIDE 50 MG/ML
50 INJECTION INTRAMUSCULAR; INTRAVENOUS AS NEEDED
Status: DISCONTINUED | OUTPATIENT
Start: 2024-06-03 | End: 2024-06-03 | Stop reason: HOSPADM

## 2024-06-03 RX ORDER — PALONOSETRON 0.05 MG/ML
0.25 INJECTION, SOLUTION INTRAVENOUS ONCE
Status: COMPLETED | OUTPATIENT
Start: 2024-06-03 | End: 2024-06-03

## 2024-06-03 RX ORDER — LORAZEPAM 2 MG/ML
0.5 INJECTION INTRAMUSCULAR EVERY 4 HOURS PRN
Status: DISCONTINUED | OUTPATIENT
Start: 2024-06-03 | End: 2024-06-03 | Stop reason: HOSPADM

## 2024-06-03 RX ORDER — PROCHLORPERAZINE EDISYLATE 5 MG/ML
10 INJECTION INTRAMUSCULAR; INTRAVENOUS EVERY 6 HOURS PRN
Status: DISCONTINUED | OUTPATIENT
Start: 2024-06-03 | End: 2024-06-03 | Stop reason: HOSPADM

## 2024-06-03 RX ORDER — ALBUTEROL SULFATE 0.83 MG/ML
3 SOLUTION RESPIRATORY (INHALATION) AS NEEDED
Status: DISCONTINUED | OUTPATIENT
Start: 2024-06-03 | End: 2024-06-03 | Stop reason: HOSPADM

## 2024-06-03 RX ORDER — FAMOTIDINE 10 MG/ML
20 INJECTION INTRAVENOUS ONCE AS NEEDED
Status: DISCONTINUED | OUTPATIENT
Start: 2024-06-03 | End: 2024-06-03 | Stop reason: HOSPADM

## 2024-06-03 RX ORDER — LACTULOSE 10 G/15ML
10 SOLUTION ORAL DAILY
Qty: 960 ML | Refills: 5 | Status: SHIPPED | OUTPATIENT
Start: 2024-06-03

## 2024-06-03 RX ADMIN — PALONOSETRON HYDROCHLORIDE 250 MCG: 0.25 INJECTION INTRAVENOUS at 11:26

## 2024-06-03 RX ADMIN — SODIUM CHLORIDE 500 ML: 9 INJECTION, SOLUTION INTRAVENOUS at 11:05

## 2024-06-03 RX ADMIN — LORAZEPAM 0.5 MG: 2 INJECTION, SOLUTION INTRAMUSCULAR; INTRAVENOUS at 11:25

## 2024-06-03 RX ADMIN — DOCETAXEL 160 MG: 20 INJECTION, SOLUTION, CONCENTRATE INTRAVENOUS at 13:13

## 2024-06-03 RX ADMIN — DIPHENHYDRAMINE HYDROCHLORIDE 50 MG: 50 INJECTION, SOLUTION INTRAMUSCULAR; INTRAVENOUS at 11:57

## 2024-06-03 RX ADMIN — PEGFILGRASTIM 6 MG: KIT SUBCUTANEOUS at 15:14

## 2024-06-03 RX ADMIN — FOSAPREPITANT 150 MG: 150 INJECTION, POWDER, LYOPHILIZED, FOR SOLUTION INTRAVENOUS at 12:22

## 2024-06-03 RX ADMIN — CYCLOPHOSPHAMIDE 1296 MG: 500 INJECTION, POWDER, FOR SOLUTION INTRAVENOUS; ORAL at 14:35

## 2024-06-03 RX ADMIN — DEXAMETHASONE SODIUM PHOSPHATE 16 MG: 10 INJECTION, SOLUTION INTRAMUSCULAR; INTRAVENOUS at 11:32

## 2024-06-03 ASSESSMENT — PAIN SCALES - GENERAL: PAINLEVEL: 0-NO PAIN

## 2024-06-03 NOTE — PROGRESS NOTES
Will begin cooling cap when Emend goes up on pump.     1100 - Requested for patient to have midline placed prior to next treatment.  Shanthi stated she will get this set up.       1515 - completed cycle3 for docetaxel/cyclophos - mikey post cooling going for 90 minutes.  Princessta applied.

## 2024-06-03 NOTE — PATIENT INSTRUCTIONS
We have set up an US IV placement prior to next cycle of chemo 6/24/2024  No need to be NPO for IV placement.    Please report to radiology department at [facility] which is across the street from [facility] at 0830.    You will see Dr. Gallegos and tentatively have treatment after your visit.     Please let us know if you have any questions.

## 2024-06-04 ENCOUNTER — TELEPHONE (OUTPATIENT)
Dept: HEMATOLOGY/ONCOLOGY | Facility: CLINIC | Age: 39
End: 2024-06-04
Payer: COMMERCIAL

## 2024-06-04 NOTE — TELEPHONE ENCOUNTER
I spoke with Alice and notified her that as of now there are no scan orders from Dr. Gallegos and per Dr. Gallegos she does not want scans ordered at this time. She is aware that she will see Dr. Gallegos on 6/24/24 with an infusion to follow. All other questions were answered and she was appreciative of the call back.

## 2024-06-05 NOTE — PROGRESS NOTES
I called and spoke with Alice regarding Andres question about lab work. Explained to patient that per Dr. Gallegos elevated ALT is likely from chemotherapy and we will continue to monitor. Educated that we will obtain lab work prior to next treatment and Dr. Gallegos will monitor trend of ALT. She will have a visit with Dr. Gallegos before treatment, and they will discuss plan based on lab values. Patient verbalized understanding and had no stated she had no further questions. Advised patient to call back or My Chart if questions or concerns.

## 2024-06-05 NOTE — TELEPHONE ENCOUNTER
----- Message from Luz Rojo sent at 6/5/2024  8:17 AM EDT -----  Regarding: Lab results   Contact: [phone]  Hello,  Just have a quick follow up question. Should I expect an increase in the alt after this most recent round (3rd) and then my 4th. Is it cumulative? I just want to prepare myself incase the numbers increase more. Thank you!

## 2024-06-07 ENCOUNTER — TELEPHONE (OUTPATIENT)
Dept: HEMATOLOGY/ONCOLOGY | Facility: CLINIC | Age: 39
End: 2024-06-07

## 2024-06-07 ENCOUNTER — PATIENT MESSAGE (OUTPATIENT)
Dept: HEMATOLOGY/ONCOLOGY | Facility: CLINIC | Age: 39
End: 2024-06-07
Payer: COMMERCIAL

## 2024-06-07 NOTE — PROGRESS NOTES
"I spoke with Alice about her questions. I did let her know that I spoke with Dr. Gallegos about Alice starting her period. Per Dr. Gallegos \" Chemo is suppressive but it doesn't always suppress periods. It is normal and not dangerous.\" I told Alice all information. She was very appreciative. We also spoke about her scans and when they are needed. I did let her know that they would be ordered based on symptoms that she endorses or if they are warranted. She verbalized understanding and was appreciative. All questions were answered and she was very appreciative.   "

## 2024-06-07 NOTE — TELEPHONE ENCOUNTER
----- Message from Luz Rojo sent at 2024 10:15 AM EDT -----  Regardin questions   Contact: [phone]    Hello,    I have two questions. When I was in for my infusion on Monday I told Dr. Gallegos I hadn’t had a period after the first TC. I just got it yesterday (2 days after my 3rd round) just confirming this is normal or expected.     Also…   I’m sorry to message about this again but I keep thinking about it. I want to seek clarity before my next appointment on . I appreciate Dr. Gallegos getting back to me and confirming no scans at this time. I just want to be sure I know what “at this time” means. I have a lot of anxiety about scans and I was under the impression that I would not have another CT scan or nuclear bone scan unless there was some compelling reason (hopefully not!) I had negative staging scans prior to surgery, clear margins, almost done with chemo. I will soon have radiation and then start hormone therapy. Can you please confirm that is the extent of the plan? I understand outside factors could change this but I would feel eased knowing this was as I thought it was.     I should note - I totally expect the 6 months breast mri/ultrasound scans and I am grateful to have those. My ultrasound is scheduled for January. (5-6 months after radiation) Thank you!!    Again..I appreciate Dr. Gallegos clarifying no scans “at this time” but I just want to be sure I understand bc of my anxious brain. I was thrown when my nurse on Monday said I’d be having scans after chemo bc it was the first time I’d heard that. She may have assumed I was having chemo prior to surgery. Thank you!

## 2024-06-10 ENCOUNTER — APPOINTMENT (OUTPATIENT)
Dept: HEMATOLOGY/ONCOLOGY | Facility: CLINIC | Age: 39
End: 2024-06-10
Payer: COMMERCIAL

## 2024-06-17 DIAGNOSIS — F41.9 ANXIETY AND DEPRESSION: ICD-10-CM

## 2024-06-17 DIAGNOSIS — F32.A ANXIETY AND DEPRESSION: ICD-10-CM

## 2024-06-19 RX ORDER — BUSPIRONE HYDROCHLORIDE 5 MG/1
5 TABLET ORAL DAILY
Qty: 90 TABLET | Refills: 0 | Status: SHIPPED | OUTPATIENT
Start: 2024-06-19 | End: 2025-06-19

## 2024-06-19 NOTE — TELEPHONE ENCOUNTER
Talked to patient  states she is been taking  Buspar 5mg tab  only 1 tab QD not  TID , and needs refill please .  Thank you  !

## 2024-06-20 ENCOUNTER — LAB (OUTPATIENT)
Dept: LAB | Facility: CLINIC | Age: 39
End: 2024-06-20
Payer: COMMERCIAL

## 2024-06-20 DIAGNOSIS — Z17.0 MALIGNANT NEOPLASM OF UPPER-OUTER QUADRANT OF RIGHT BREAST IN FEMALE, ESTROGEN RECEPTOR POSITIVE (MULTI): ICD-10-CM

## 2024-06-20 DIAGNOSIS — C50.411 MALIGNANT NEOPLASM OF UPPER-OUTER QUADRANT OF RIGHT BREAST IN FEMALE, ESTROGEN RECEPTOR POSITIVE (MULTI): ICD-10-CM

## 2024-06-20 LAB
ALBUMIN SERPL BCP-MCNC: 4.3 G/DL (ref 3.4–5)
ALP SERPL-CCNC: 74 U/L (ref 33–110)
ALT SERPL W P-5'-P-CCNC: 51 U/L (ref 7–45)
ANION GAP SERPL CALC-SCNC: 13 MMOL/L (ref 10–20)
AST SERPL W P-5'-P-CCNC: 26 U/L (ref 9–39)
B-HCG SERPL-ACNC: <2 MIU/ML
BASOPHILS # BLD AUTO: 0.02 X10*3/UL (ref 0–0.1)
BASOPHILS NFR BLD AUTO: 0.4 %
BILIRUB SERPL-MCNC: 0.4 MG/DL (ref 0–1.2)
BUN SERPL-MCNC: 16 MG/DL (ref 6–23)
CALCIUM SERPL-MCNC: 9.5 MG/DL (ref 8.6–10.3)
CHLORIDE SERPL-SCNC: 104 MMOL/L (ref 98–107)
CO2 SERPL-SCNC: 28 MMOL/L (ref 21–32)
CREAT SERPL-MCNC: 0.7 MG/DL (ref 0.5–1.05)
EGFRCR SERPLBLD CKD-EPI 2021: >90 ML/MIN/1.73M*2
EOSINOPHIL # BLD AUTO: 0.01 X10*3/UL (ref 0–0.7)
EOSINOPHIL NFR BLD AUTO: 0.2 %
ERYTHROCYTE [DISTWIDTH] IN BLOOD BY AUTOMATED COUNT: 14.8 % (ref 11.5–14.5)
GLUCOSE SERPL-MCNC: 111 MG/DL (ref 74–99)
HCT VFR BLD AUTO: 33.9 % (ref 36–46)
HGB BLD-MCNC: 11.1 G/DL (ref 12–16)
IMM GRANULOCYTES # BLD AUTO: 0.02 X10*3/UL (ref 0–0.7)
IMM GRANULOCYTES NFR BLD AUTO: 0.4 % (ref 0–0.9)
LYMPHOCYTES # BLD AUTO: 1.32 X10*3/UL (ref 1.2–4.8)
LYMPHOCYTES NFR BLD AUTO: 26.1 %
MCH RBC QN AUTO: 29.4 PG (ref 26–34)
MCHC RBC AUTO-ENTMCNC: 32.7 G/DL (ref 32–36)
MCV RBC AUTO: 90 FL (ref 80–100)
MONOCYTES # BLD AUTO: 0.37 X10*3/UL (ref 0.1–1)
MONOCYTES NFR BLD AUTO: 7.3 %
NEUTROPHILS # BLD AUTO: 3.31 X10*3/UL (ref 1.2–7.7)
NEUTROPHILS NFR BLD AUTO: 65.6 %
PLATELET # BLD AUTO: 205 X10*3/UL (ref 150–450)
POTASSIUM SERPL-SCNC: 3.8 MMOL/L (ref 3.5–5.3)
PROT SERPL-MCNC: 7.5 G/DL (ref 6.4–8.2)
RBC # BLD AUTO: 3.78 X10*6/UL (ref 4–5.2)
SODIUM SERPL-SCNC: 141 MMOL/L (ref 136–145)
WBC # BLD AUTO: 5.1 X10*3/UL (ref 4.4–11.3)

## 2024-06-20 PROCEDURE — 84702 CHORIONIC GONADOTROPIN TEST: CPT

## 2024-06-20 PROCEDURE — 85025 COMPLETE CBC W/AUTO DIFF WBC: CPT

## 2024-06-20 PROCEDURE — 36415 COLL VENOUS BLD VENIPUNCTURE: CPT

## 2024-06-20 PROCEDURE — 80053 COMPREHEN METABOLIC PANEL: CPT

## 2024-06-22 NOTE — PROGRESS NOTES
"Patient ID: Alice Rojo is a 38 y.o. female.  Cancer Staging   Malignant neoplasm of upper-outer quadrant of right breast in female, estrogen receptor positive (Multi)  Staging form: Breast, AJCC 8th Edition  - Clinical: Stage IB (cT1c, cN1, cM0, G2, ER+, AL+, HER2-) - Signed by Liliana Barrett DO on 2/6/2024  - Pathologic: Stage IB (pT1c, pN1(sn), cM0, G3, ER+, AL+, HER2-) - Signed by Liliana Barrett DO on 4/9/2024    Oncology History   Malignant neoplasm of upper-outer quadrant of right breast in female, estrogen receptor positive (Multi)   2/6/2024 Initial Diagnosis    Malignant neoplasm of upper-outer quadrant of right breast in female, estrogen receptor positive (CMS/HCC)     2/6/2024 Cancer Staged    Staging form: Breast, AJCC 8th Edition, Clinical: Stage IB (cT1c, cN1, cM0, G2, ER+, AL+, HER2-) - Signed by Liliana Barrett DO on 2/6/2024 4/9/2024 Cancer Staged    Staging form: Breast, AJCC 8th Edition, Pathologic: Stage IB (pT1c, pN1(sn), cM0, G3, ER+, AL+, HER2-) - Signed by Liliana Barrett DO on 4/9/2024 4/22/2024 -  Chemotherapy    TC (DOCEtaxel / Cyclophosphamide), 21 Day Cycles       Subjective    HPI  Ms. Luz Rojo \"Alfonzo" is a 39 y/o female presenting for follow-up for breast cancer. Here for consideration for cycle 4 of DOCEtaxel.     Patient reports some fatigue. She reports normalized bowel movements, and improved constipation. She notices changes in taste. She is scheduled for radiation therapy tomorrow. Patient denies any new lumps, bumps, or rashes. Patient has not had a menstrual cycle since April. She does mention having ovarian cysts.     Patient's past medical history, surgical history, family history and social history reviewed.     Review of Systems:   Review of Systems:    Positive per HPI, otherwise negative.     Objective    Vitals:    06/24/24 1035   Pulse: 74   Resp: 16   Temp: 36.7 °C (98.1 °F)   SpO2: 98%     Physical Exam  Gen: appears well in clinic, " NAD  HEENT: atraumatic head, normocephalic, EOMI, conjunctiva normal  LUNG: no increased WOB, CTAB  CV: No JVD. RRR  GI: soft, NT, ND  LE: no LE edema  Skin: no obvious rashes or lesions on visible skin  Neuro: interactive, no focal deficits noted  Psych: normal mood and affect  Performance Status:  Symptomatic; fully ambulatory    Labs/Imaging/Pathology: personally reviewed reports and images in Epic electronic medical record system. Pertinent results as it related to the plan represented in below in assessment and plan.   Assessment/Plan   1.Right breast IDC, stage IB ypT1c (20mm)pN1 (2/6LNs) M0, ER 60-70%, OH 80-90% , HER 2 IHC 1+  - initially diagnosed after abnormal mammogram first noted 1/24/2024 at [facility]  - Follow-up diagnostic imaging on 1/26/2024 reveals a spiculated mass at 10:30 10cmFN measuring 2cm on imaging.  There are also 2 abnormal axillary lymph nodes  - 1/30/2024 core bx of right breast mass path and LN revealed IDC grade 2 and axillary node bx same day revealed metastatic breast carciboma. ER 60-70%, OH 80-90% , HER 2 IHC 1+  - staging scans with CT c/a/p and bone scan 2/6/24 were negative.  - 3/22/24 right breast partial mastectomy path revealed 20 mm focus of invasive cancer, G3, along with Grade 3 DCIS. Margins negative. 2 out of 6 lymph nodes positive. Final staging pT1c pN1a.     - Today we discussed the role of adjuvant chemotherapy given higher clinical risk factors with size of tumor and node positive disease in setting of premenopausal state  - We did discuss there is a role for anthracycline-based therapy given her premenopausal status and node positive disease putting her at higher risk per the ABC trials (Rashawn, et al JCO 2017) we discussed with hormone positive being lower risk,  the benefit from anthracyclines compared to taxotere plus cytoxan is less clear and after reviewing side effects from both, patient is hesitant about the potential side effects from anthracycline especially as  she has hx of hyperemesis along with concern about secondary leukemia  - we discussed Taxotere plus cytoxan is a very reasonable alternative  - We also reviewed that much of the benefit of chemotherapy comes from the ovarian suppression affect and she is open to ovarian suppression long-term.  - 4/22/24 C1D1  Taxotere plus cytoxan included Emend pre-med in addition to dexamethasone 3 days post and the night before due to her concern for hyperemesis (as she had with pregnancy)  - cooling cap      5/13/24:  - overall tolerated C1 very well with minimal toxicity, reports minimal nausea  - does have some trouble with sleep and will decrease dex to daily after treatment to see if still manageable  - changes in dose or premeds today  - No contraindications to proceed with cycle 2  - Will continue Ativan for anxiety as premed  - Patient is tolerating treatment   - We discussed that there will be no dose adjustment at this time   - Patient would like to cancel day 8 visits and call the office PRN.   - Patient has no other major complaints at this time   - RTC in 3 weeks for cycle 3        6/3/24:   - Patient overall tolerated cycle 2 very well, her main complaint was constipation.   - Discuss with her and she can up titrate Miralax.  - Will also send a prescription for Lactulose to use if severe constipation.  - No neuropathy, no other GI complaints.  - No contraindications to proceed with cycle 3 today.  - Labs reviewed, slight elevation in ALT is likely from the chemotherapy and we will monitor.  - RTC in three weeks for cycle 4    6/24/24:   - Tolerated last cycle fairly well without any increased toxicity.  - She did have some days of fatigue  - no dose adjustments today  - No contraindications to proceed with cycle 4  - We did discuss that at this point she will proceed to radiation simulation tomorrow  - I will send a prescription for Arimidex that she can start 2 weeks after finishing radiation.  - She will start  Lupron in two months when I see her and we will plan for ovarian suppression.   - She did not need any refills today.   - RTC with me in 2 months      Reviewed ongoing medical problems and how they relate to her breast cancer, will continue long term monitoring.       RTC in 2 months. This note has been transcribed using a medical scribe and there is a possibility of unintentional typing misprints.         Diagnoses and all orders for this visit:  Malignant neoplasm of upper-outer quadrant of right breast in female, estrogen receptor positive (Multi)  -     Clinic Appointment Request  -     Clinic Appointment Request; Future  -     CBC and Auto Differential; Future  -     Comprehensive metabolic panel; Future  -     anastrozole (Arimidex) 1 mg tablet; Take 1 tablet (1 mg total) by mouth once daily.  Swallow whole with a drink of water.  Other orders  -     leuprolide (1-month) (Lupron Depot) injection 3.75 mg  -     sodium chloride 0.9 % bolus 500 mL  -     dextrose 5 % in water (D5W) bolus  -     diphenhydrAMINE (BENADryl) injection 50 mg  -     methylPREDNISolone sod succinate (SOLU-Medrol) 40 mg/mL injection 40 mg  -     famotidine PF (Pepcid) injection 20 mg  -     EPINEPHrine (Epipen) injection syringe 0.3 mg  -     albuterol 2.5 mg /3 mL (0.083 %) nebulizer solution 3 mL    Ban Gallegos MD  Hematology/Oncology  [facility]    Scribe Attestation  By signing my name below, Ashley GARCIA Jaqueline Boone , Scribe attest that this documentation has been prepared under the direction and in the presence of Ban Gallegos MD.    Time Spent  Prep time on day of patient encounter: 5 minutes  Time spent directly with patient, family or caregiver: 25 minutes  Additional Time Spent on Patient Care Activities: 7 minutes  Documentation Time: 5 minutes  Other Time Spent: 0 minutes  Total: 42 minutes

## 2024-06-24 ENCOUNTER — HOSPITAL ENCOUNTER (OUTPATIENT)
Dept: RADIOLOGY | Facility: HOSPITAL | Age: 39
Discharge: HOME | End: 2024-06-24
Payer: COMMERCIAL

## 2024-06-24 ENCOUNTER — APPOINTMENT (OUTPATIENT)
Dept: HEMATOLOGY/ONCOLOGY | Facility: CLINIC | Age: 39
End: 2024-06-24
Payer: COMMERCIAL

## 2024-06-24 ENCOUNTER — INFUSION (OUTPATIENT)
Dept: HEMATOLOGY/ONCOLOGY | Facility: CLINIC | Age: 39
End: 2024-06-24
Payer: COMMERCIAL

## 2024-06-24 ENCOUNTER — HOSPITAL ENCOUNTER (OUTPATIENT)
Dept: RADIOLOGY | Facility: EXTERNAL LOCATION | Age: 39
Discharge: HOME | End: 2024-06-24

## 2024-06-24 ENCOUNTER — OFFICE VISIT (OUTPATIENT)
Dept: HEMATOLOGY/ONCOLOGY | Facility: CLINIC | Age: 39
End: 2024-06-24
Payer: COMMERCIAL

## 2024-06-24 VITALS
OXYGEN SATURATION: 98 % | RESPIRATION RATE: 16 BRPM | TEMPERATURE: 98.1 F | WEIGHT: 226.19 LBS | HEART RATE: 74 BPM | BODY MASS INDEX: 36.51 KG/M2

## 2024-06-24 VITALS — SYSTOLIC BLOOD PRESSURE: 170 MMHG | DIASTOLIC BLOOD PRESSURE: 107 MMHG

## 2024-06-24 DIAGNOSIS — C50.411 MALIGNANT NEOPLASM OF UPPER-OUTER QUADRANT OF RIGHT BREAST IN FEMALE, ESTROGEN RECEPTOR POSITIVE (MULTI): Primary | ICD-10-CM

## 2024-06-24 DIAGNOSIS — Z17.0 MALIGNANT NEOPLASM OF UPPER-OUTER QUADRANT OF RIGHT BREAST IN FEMALE, ESTROGEN RECEPTOR POSITIVE (MULTI): ICD-10-CM

## 2024-06-24 DIAGNOSIS — C50.411 MALIGNANT NEOPLASM OF UPPER-OUTER QUADRANT OF RIGHT BREAST IN FEMALE, ESTROGEN RECEPTOR POSITIVE (MULTI): ICD-10-CM

## 2024-06-24 DIAGNOSIS — Z17.0 MALIGNANT NEOPLASM OF UPPER-OUTER QUADRANT OF RIGHT BREAST IN FEMALE, ESTROGEN RECEPTOR POSITIVE (MULTI): Primary | ICD-10-CM

## 2024-06-24 PROCEDURE — C1751 CATH, INF, PER/CENT/MIDLINE: HCPCS

## 2024-06-24 PROCEDURE — 99214 OFFICE O/P EST MOD 30 MIN: CPT | Performed by: INTERNAL MEDICINE

## 2024-06-24 PROCEDURE — 2500000004 HC RX 250 GENERAL PHARMACY W/ HCPCS (ALT 636 FOR OP/ED): Performed by: INTERNAL MEDICINE

## 2024-06-24 PROCEDURE — 96377 APPLICATON ON-BODY INJECTOR: CPT

## 2024-06-24 PROCEDURE — 96372 THER/PROPH/DIAG INJ SC/IM: CPT

## 2024-06-24 PROCEDURE — 96417 CHEMO IV INFUS EACH ADDL SEQ: CPT

## 2024-06-24 PROCEDURE — G2211 COMPLEX E/M VISIT ADD ON: HCPCS | Performed by: INTERNAL MEDICINE

## 2024-06-24 PROCEDURE — 2780000003 HC OR 278 NO HCPCS

## 2024-06-24 PROCEDURE — 96375 TX/PRO/DX INJ NEW DRUG ADDON: CPT | Mod: INF

## 2024-06-24 PROCEDURE — 96367 TX/PROPH/DG ADDL SEQ IV INF: CPT

## 2024-06-24 PROCEDURE — 96413 CHEMO IV INFUSION 1 HR: CPT

## 2024-06-24 RX ORDER — LORAZEPAM 2 MG/ML
0.5 INJECTION INTRAMUSCULAR EVERY 4 HOURS PRN
Status: DISCONTINUED | OUTPATIENT
Start: 2024-06-24 | End: 2024-06-24 | Stop reason: HOSPADM

## 2024-06-24 RX ORDER — EPINEPHRINE 0.3 MG/.3ML
0.3 INJECTION SUBCUTANEOUS EVERY 5 MIN PRN
OUTPATIENT
Start: 2024-08-24

## 2024-06-24 RX ORDER — FAMOTIDINE 10 MG/ML
20 INJECTION INTRAVENOUS ONCE AS NEEDED
Status: DISCONTINUED | OUTPATIENT
Start: 2024-06-24 | End: 2024-06-24 | Stop reason: HOSPADM

## 2024-06-24 RX ORDER — PROCHLORPERAZINE MALEATE 10 MG
10 TABLET ORAL EVERY 6 HOURS PRN
Status: DISCONTINUED | OUTPATIENT
Start: 2024-06-24 | End: 2024-06-24 | Stop reason: HOSPADM

## 2024-06-24 RX ORDER — ANASTROZOLE 1 MG/1
1 TABLET ORAL DAILY
Qty: 30 TABLET | Refills: 4 | Status: SHIPPED | OUTPATIENT
Start: 2024-06-24 | End: 2024-07-24

## 2024-06-24 RX ORDER — ALBUTEROL SULFATE 0.83 MG/ML
3 SOLUTION RESPIRATORY (INHALATION) AS NEEDED
OUTPATIENT
Start: 2024-08-24

## 2024-06-24 RX ORDER — HEPARIN SODIUM,PORCINE/PF 10 UNIT/ML
50 SYRINGE (ML) INTRAVENOUS AS NEEDED
OUTPATIENT
Start: 2024-06-24

## 2024-06-24 RX ORDER — HEPARIN 100 UNIT/ML
500 SYRINGE INTRAVENOUS AS NEEDED
OUTPATIENT
Start: 2024-06-24

## 2024-06-24 RX ORDER — PALONOSETRON 0.05 MG/ML
0.25 INJECTION, SOLUTION INTRAVENOUS ONCE
Status: COMPLETED | OUTPATIENT
Start: 2024-06-24 | End: 2024-06-24

## 2024-06-24 RX ORDER — EPINEPHRINE 0.3 MG/.3ML
0.3 INJECTION SUBCUTANEOUS EVERY 5 MIN PRN
Status: DISCONTINUED | OUTPATIENT
Start: 2024-06-24 | End: 2024-06-24 | Stop reason: HOSPADM

## 2024-06-24 RX ORDER — DIPHENHYDRAMINE HYDROCHLORIDE 50 MG/ML
50 INJECTION INTRAMUSCULAR; INTRAVENOUS AS NEEDED
Status: DISCONTINUED | OUTPATIENT
Start: 2024-06-24 | End: 2024-06-24 | Stop reason: HOSPADM

## 2024-06-24 RX ORDER — PROCHLORPERAZINE EDISYLATE 5 MG/ML
10 INJECTION INTRAMUSCULAR; INTRAVENOUS EVERY 6 HOURS PRN
Status: DISCONTINUED | OUTPATIENT
Start: 2024-06-24 | End: 2024-06-24 | Stop reason: HOSPADM

## 2024-06-24 RX ORDER — FAMOTIDINE 10 MG/ML
20 INJECTION INTRAVENOUS ONCE AS NEEDED
OUTPATIENT
Start: 2024-08-24

## 2024-06-24 RX ORDER — DIPHENHYDRAMINE HYDROCHLORIDE 50 MG/ML
50 INJECTION INTRAMUSCULAR; INTRAVENOUS AS NEEDED
OUTPATIENT
Start: 2024-08-24

## 2024-06-24 RX ORDER — ALBUTEROL SULFATE 0.83 MG/ML
3 SOLUTION RESPIRATORY (INHALATION) AS NEEDED
Status: DISCONTINUED | OUTPATIENT
Start: 2024-06-24 | End: 2024-06-24 | Stop reason: HOSPADM

## 2024-06-24 ASSESSMENT — PAIN SCALES - GENERAL: PAINLEVEL: 0-NO PAIN

## 2024-06-24 NOTE — CONSULTS
Reason For Consult  Midline placement for chemotherapy.    Allergies  Patient has no known allergies.    Assessment/Plan     Patient verbalized understanding of risks/benefits of midline insertion and gave verbal consent for procedure. PowerGlide Pro Midline catheter placed to DELVIS basilic vein using ultrasound guidance. See LDA for details. Midline has granado, non-pulsatile blood return and flushes easily. Catheter secured with statlock, biopatch placed and Midline tegaderm dressing placed. Line dressing labeled per policy. Patient tolerated procedure well, denies pain/numbness. Midline catheter ready for immediate use.      Khushbu Kimble RN

## 2024-06-25 ENCOUNTER — HOSPITAL ENCOUNTER (OUTPATIENT)
Dept: RADIATION ONCOLOGY | Facility: CLINIC | Age: 39
Setting detail: RADIATION/ONCOLOGY SERIES
Discharge: HOME | End: 2024-06-25
Payer: COMMERCIAL

## 2024-06-25 ENCOUNTER — HOSPITAL ENCOUNTER (OUTPATIENT)
Dept: RADIOLOGY | Facility: EXTERNAL LOCATION | Age: 39
Discharge: HOME | End: 2024-06-25

## 2024-06-25 DIAGNOSIS — Z17.0 MALIGNANT NEOPLASM OF UPPER-OUTER QUADRANT OF RIGHT BREAST IN FEMALE, ESTROGEN RECEPTOR POSITIVE (MULTI): ICD-10-CM

## 2024-06-25 DIAGNOSIS — C50.411 MALIGNANT NEOPLASM OF UPPER-OUTER QUADRANT OF RIGHT BREAST IN FEMALE, ESTROGEN RECEPTOR POSITIVE (MULTI): ICD-10-CM

## 2024-06-25 PROCEDURE — 77290 THER RAD SIMULAJ FIELD CPLX: CPT | Performed by: RADIOLOGY

## 2024-06-25 PROCEDURE — 77334 RADIATION TREATMENT AID(S): CPT | Performed by: RADIOLOGY

## 2024-06-27 ENCOUNTER — APPOINTMENT (OUTPATIENT)
Dept: BEHAVIORAL HEALTH | Facility: HOSPITAL | Age: 39
End: 2024-06-27
Payer: COMMERCIAL

## 2024-06-28 ENCOUNTER — APPOINTMENT (OUTPATIENT)
Dept: INTEGRATIVE MEDICINE | Facility: CLINIC | Age: 39
End: 2024-06-28
Payer: COMMERCIAL

## 2024-06-28 DIAGNOSIS — R11.0 CHEMOTHERAPY-INDUCED NAUSEA: Primary | ICD-10-CM

## 2024-06-28 DIAGNOSIS — T45.1X5A CHEMOTHERAPY-INDUCED NAUSEA: Primary | ICD-10-CM

## 2024-06-28 PROCEDURE — 97811 ACUP 1/> W/O ESTIM EA ADD 15: CPT

## 2024-06-28 PROCEDURE — 97810 ACUP 1/> WO ESTIM 1ST 15 MIN: CPT

## 2024-06-28 NOTE — PROGRESS NOTES
Acupuncture Visit:     Subjective   Patient ID: Alice Rojo is a 38 y.o. female who presents for Chemo Related Symptoms and Fatigue    [facility]  25 VPCY  Initial date: 05/31/24  Referring physician: Dr. Elliott    CC: Chemo related sx including nausea, constipation and musculoskeletal pain and soreness    Update: 06/28/24  Tx 2/25    Patient had her last round of chemo on Monday and feels a little beat up.  Currently experiencing fatigue, nausea, constipation, and feeling a bit off in general.    Initial Visit: 05/31/24    Patient referred by Dr. Smalls for the support of chemo related sx along with musculoskeletal pain related to ongoing cancer treatment.  Patient reports she has been experiencing a lot of muscle and bone soreness that's constant but manageable although it affects her sleep as she feels it at night.  She also endorse constipation and nausea.      She has completed 2 of 4 rounds of chemo so far.  Last round is scheduled at end of June.  Nausea has been managed by medication but believes it may be causing her constipation.    Onset:  End of February had a biopsy which confirmed breast cancer.  Chemo started in April and radiation to follow at the conclusion on chemo.  All sx started after chemotherapy.  Also experiences fatigue daily.    Location:  Back of neck bilaterally but not always at the same time    Duration:  Depends on chemotherapy schedule; all sx worse during and week immediately after chemotherapy    Frequency:  Constipation full week of treatment and following week after treatment.  All meds not working.  Fatigue worse during week of Chemo    Characteristics:  Fatigue   Neck Pain - dull throbbing, achy and sharp (varies over a couple of days)    Alleviates:  Clariton (newly discovered as a beneficial to neck pain from chemo)  Occasion Advil    Aggravates:   Chemo during week of or just after    Related sx:  Disrupts sleep     Tx methods:  Rx meds  OTC meds  NO therapies at  "the moment    Pain severity:  Worse - 6-7/10  Normal - 3/10  Today - 2-3/10    Feeling (emotions or fatigue):  Fatigue  Feeling \"ok\" on Zoloft and buse bar to help    Other relevant:  Dx breast cancer    Goal for acupuncture treatment:  \"Just sx management\"          Session Information  Is this acupuncture treatment being billed to the patient's insurance company: Yes  This is visit number: 2  The patient has a total number of visits of: 25  Initial Acupuncture Treatment date: 05/31/24  Name of Insurance Company: [facility]  Visit Type: Follow-up visit  Medical History Reviewed: I have reviewed pertinent medical history in EHR, and no contraindications are present to provide treatment  Description of present complaint: Nausea, Fatigue  History of Present Illness: Chemo Related Sx         Review of Systems         Provider reviewed plan for the acupuncture session, precautions and contraindications. Patient/guardian/hospital staff has given consent to treat with full understanding of what to expect during the session. Before acupuncture began, provider explained to the patient to communicate at any time if the procedure was causing discomfort past their tolerance level. Patient agreed to advise acupuncturist. The acupuncturist counseled the patient on the risks of acupuncture treatment including pain, infection, bleeding, and no relief of pain. The patient was positioned comfortably. There was no evidence of infection at the site of needle insertions.    Objective   Physical Exam         Treatment Plan  Treatment Goals: Fatigue reduction, Relaxation, Wellbeing improvement, Coping    Acupuncture Treatment  Patient Position: Supine on a table  Acupuncture Needling: Yes  Needle Guage: 36 guage /.20/ Blue seirin, 40 guage /.16/ Red seirin  Body Points: With retention  Body Points - Bilateral: GV20; Yintang; LI4; LV3; LU7; KD3,6; PC6; SP4,6; ST25,36; CV6,12  Auricular Points: No  Electroacupuncture Used: " No  Needle Count In: 24  Needle Count Out: 24  Needle Retention Time (min): 30 minutes  Total Face to Face Time (min): 30 minutes              Assessment/Plan   Diagnoses and all orders for this visit:  Chemotherapy-induced nausea

## 2024-07-01 ENCOUNTER — APPOINTMENT (OUTPATIENT)
Dept: HEMATOLOGY/ONCOLOGY | Facility: CLINIC | Age: 39
End: 2024-07-01
Payer: COMMERCIAL

## 2024-07-02 ENCOUNTER — APPOINTMENT (OUTPATIENT)
Dept: INTEGRATIVE MEDICINE | Facility: CLINIC | Age: 39
End: 2024-07-02
Payer: COMMERCIAL

## 2024-07-02 ENCOUNTER — HOSPITAL ENCOUNTER (OUTPATIENT)
Dept: RADIATION ONCOLOGY | Facility: CLINIC | Age: 39
Setting detail: RADIATION/ONCOLOGY SERIES
Discharge: HOME | End: 2024-07-02
Payer: COMMERCIAL

## 2024-07-02 PROCEDURE — 77338 DESIGN MLC DEVICE FOR IMRT: CPT | Performed by: RADIOLOGY

## 2024-07-02 PROCEDURE — 77300 RADIATION THERAPY DOSE PLAN: CPT | Performed by: RADIOLOGY

## 2024-07-02 PROCEDURE — 77301 RADIOTHERAPY DOSE PLAN IMRT: CPT | Performed by: RADIOLOGY

## 2024-07-02 PROCEDURE — 77293 RESPIRATOR MOTION MGMT SIMUL: CPT | Performed by: RADIOLOGY

## 2024-07-03 ENCOUNTER — APPOINTMENT (OUTPATIENT)
Dept: INTEGRATIVE MEDICINE | Facility: CLINIC | Age: 39
End: 2024-07-03
Payer: COMMERCIAL

## 2024-07-03 ENCOUNTER — NURSE TRIAGE (OUTPATIENT)
Dept: RADIATION ONCOLOGY | Facility: CLINIC | Age: 39
End: 2024-07-03

## 2024-07-03 NOTE — TELEPHONE ENCOUNTER
Call placed to patient to follow up on her phone call and questions regarding radiation start date. Phone call went straight to . This nurse left a message updating patient that currently per our radiation techs plan is ready and only waiting insurance approval.  Our radiation techs will reach out to here with a radiation schedule once insurance is approved.  Call back number provided.

## 2024-07-03 NOTE — TELEPHONE ENCOUNTER
Patient returned this nurses phone call. Spoke with the patient regarding question inquiring radiation start date. Informed her that her radiation planning has been completed and currenting pending on insurance approval. Informed her that AM treatment times have been held for her per HELENA Yao. And reviewed potential end dates of beginning to mid-Aug. RTT will reach out to her with radiation schedule when appropriate. Patient with [facility] contact info for any further questions or concerns.

## 2024-07-05 ENCOUNTER — APPOINTMENT (OUTPATIENT)
Dept: INTEGRATIVE MEDICINE | Facility: CLINIC | Age: 39
End: 2024-07-05
Payer: COMMERCIAL

## 2024-07-08 ENCOUNTER — TELEPHONE (OUTPATIENT)
Dept: RADIATION ONCOLOGY | Facility: CLINIC | Age: 39
End: 2024-07-08
Payer: COMMERCIAL

## 2024-07-08 NOTE — TELEPHONE ENCOUNTER
This nurse called patient to update on current status of insurance approval and potential start date for treatment.  The treatment plan is still under review and we will contact her with her start date as it is available to us.  Patient in agreement and thankful for the update.

## 2024-07-09 ENCOUNTER — APPOINTMENT (OUTPATIENT)
Dept: INTEGRATIVE MEDICINE | Facility: CLINIC | Age: 39
End: 2024-07-09
Payer: COMMERCIAL

## 2024-07-12 ENCOUNTER — APPOINTMENT (OUTPATIENT)
Dept: INTEGRATIVE MEDICINE | Facility: CLINIC | Age: 39
End: 2024-07-12
Payer: COMMERCIAL

## 2024-07-15 ENCOUNTER — HOSPITAL ENCOUNTER (OUTPATIENT)
Dept: RADIATION ONCOLOGY | Facility: CLINIC | Age: 39
Setting detail: RADIATION/ONCOLOGY SERIES
Discharge: HOME | End: 2024-07-15
Payer: COMMERCIAL

## 2024-07-15 ENCOUNTER — PATIENT MESSAGE (OUTPATIENT)
Dept: RADIATION ONCOLOGY | Facility: CLINIC | Age: 39
End: 2024-07-15
Payer: COMMERCIAL

## 2024-07-15 DIAGNOSIS — C50.411 MALIGNANT NEOPLASM OF UPPER-OUTER QUADRANT OF RIGHT FEMALE BREAST (MULTI): ICD-10-CM

## 2024-07-15 DIAGNOSIS — C77.3 SECONDARY AND UNSPECIFIED MALIGNANT NEOPLASM OF AXILLA AND UPPER LIMB LYMPH NODES (MULTI): ICD-10-CM

## 2024-07-15 LAB
RAD ONC MSQ ACTUAL FRACTIONS DELIVERED: 1
RAD ONC MSQ ACTUAL SESSION DELIVERED DOSE: 266 CGRAY
RAD ONC MSQ ACTUAL TOTAL DOSE: 266 CGRAY
RAD ONC MSQ ELAPSED DAYS: 0
RAD ONC MSQ LAST DATE: NORMAL
RAD ONC MSQ PRESCRIBED FRACTIONAL DOSE: 266 CGRAY
RAD ONC MSQ PRESCRIBED NUMBER OF FRACTIONS: 16
RAD ONC MSQ PRESCRIBED TECHNIQUE: NORMAL
RAD ONC MSQ PRESCRIBED TOTAL DOSE: 4256 CGRAY
RAD ONC MSQ PRESCRIPTION PATTERN COMMENT: NORMAL
RAD ONC MSQ START DATE: NORMAL
RAD ONC MSQ TREATMENT COURSE NUMBER: 1
RAD ONC MSQ TREATMENT SITE: NORMAL

## 2024-07-15 PROCEDURE — 77385 HC INTENSITY-MODULATED RADIATION THERAPY (IMRT), SIMPLE: CPT | Performed by: RADIOLOGY

## 2024-07-15 NOTE — PATIENT COMMUNICATION
Spoke with patient over the phone to ensure her that no adjustments need to be made due to recent dental appointment. Patient encouraged to call with any further questions or concerns.

## 2024-07-16 ENCOUNTER — RADIATION ONCOLOGY OTV (OUTPATIENT)
Dept: RADIATION ONCOLOGY | Facility: CLINIC | Age: 39
End: 2024-07-16
Payer: COMMERCIAL

## 2024-07-16 ENCOUNTER — PATIENT MESSAGE (OUTPATIENT)
Dept: SURGICAL ONCOLOGY | Facility: CLINIC | Age: 39
End: 2024-07-16
Payer: COMMERCIAL

## 2024-07-16 ENCOUNTER — HOSPITAL ENCOUNTER (OUTPATIENT)
Dept: RADIATION ONCOLOGY | Facility: CLINIC | Age: 39
Setting detail: RADIATION/ONCOLOGY SERIES
Discharge: HOME | End: 2024-07-16
Payer: COMMERCIAL

## 2024-07-16 VITALS — BODY MASS INDEX: 37.04 KG/M2 | WEIGHT: 229.5 LBS | TEMPERATURE: 97.7 F

## 2024-07-16 DIAGNOSIS — C50.411 MALIGNANT NEOPLASM OF UPPER-OUTER QUADRANT OF RIGHT FEMALE BREAST (MULTI): ICD-10-CM

## 2024-07-16 DIAGNOSIS — C77.3 SECONDARY AND UNSPECIFIED MALIGNANT NEOPLASM OF AXILLA AND UPPER LIMB LYMPH NODES (MULTI): ICD-10-CM

## 2024-07-16 LAB
RAD ONC MSQ ACTUAL FRACTIONS DELIVERED: 2
RAD ONC MSQ ACTUAL SESSION DELIVERED DOSE: 266 CGRAY
RAD ONC MSQ ACTUAL TOTAL DOSE: 532 CGRAY
RAD ONC MSQ ELAPSED DAYS: 1
RAD ONC MSQ LAST DATE: NORMAL
RAD ONC MSQ PRESCRIBED FRACTIONAL DOSE: 266 CGRAY
RAD ONC MSQ PRESCRIBED NUMBER OF FRACTIONS: 16
RAD ONC MSQ PRESCRIBED TECHNIQUE: NORMAL
RAD ONC MSQ PRESCRIBED TOTAL DOSE: 4256 CGRAY
RAD ONC MSQ PRESCRIPTION PATTERN COMMENT: NORMAL
RAD ONC MSQ START DATE: NORMAL
RAD ONC MSQ TREATMENT COURSE NUMBER: 1
RAD ONC MSQ TREATMENT SITE: NORMAL

## 2024-07-16 PROCEDURE — 77385 HC INTENSITY-MODULATED RADIATION THERAPY (IMRT), SIMPLE: CPT | Performed by: RADIOLOGY

## 2024-07-16 ASSESSMENT — PAIN SCALES - GENERAL: PAINLEVEL: 0-NO PAIN

## 2024-07-16 NOTE — PROGRESS NOTES
Radiation Oncology On Treatment Visit    Patient Name:  Luz Rojo  MRN:  [id number]  :  1985    Referring Provider: No ref. provider found  Primary Care Provider: Richa Naranjo MD  Care Team: Patient Care Team:  Richa Naranjo MD as PCP - General  Richa aNranjo MD as PCP - [facility] PCP  Richa Naranjo MD as PCP - [facility] PCP  MD Ban Gonzalez MD as Consulting Physician (Hematology and Oncology)  MMEO Gee as  (Oncology)    Date of Service: 2024     Diagnosis:   Specialty Problems          Radiation Oncology Problems    Malignant neoplasm of upper-outer quadrant of right breast in female, estrogen receptor positive (Multi)         Treatment Summary:  3D CRT: Right Breast with lymph nodes    Treatment Period Technique Fraction Dose Fractions Total Dose   Course 1 7/15/2024-2024  (days elapsed: 1)         BREAST_RNI_R 7/15/2024-2024 VMAT 266 / 266 cGy  532 / 4,256 cGy     SUBJECTIVE: Patient tolerating radiation treatment without complaint. No new changes this week. Denies fatigue, swelling, skin changes, pain or pruritus. Reviewed skin care recommendations with patient. Toxicity as noted below.            OBJECTIVE:   Vital Signs:  Temp 36.5 °C (97.7 °F) (Temporal)   Wt 104 kg (229 lb 8 oz)   BMI 37.04 kg/m²    Pain Scale: The patient's current pain level was assessed.  They report currently having a pain of 0 out of 10.    Other Pertinent Findings:          Assessment / Plan:  The patient is tolerating radiation therapy as anticipated.  Continue per current treatment plan.       Gómez Richter MD, PhD  Attending Physician

## 2024-07-17 ENCOUNTER — HOSPITAL ENCOUNTER (OUTPATIENT)
Dept: RADIATION ONCOLOGY | Facility: CLINIC | Age: 39
Setting detail: RADIATION/ONCOLOGY SERIES
Discharge: HOME | End: 2024-07-17
Payer: COMMERCIAL

## 2024-07-17 DIAGNOSIS — C77.3 SECONDARY AND UNSPECIFIED MALIGNANT NEOPLASM OF AXILLA AND UPPER LIMB LYMPH NODES (MULTI): ICD-10-CM

## 2024-07-17 DIAGNOSIS — C50.411 MALIGNANT NEOPLASM OF UPPER-OUTER QUADRANT OF RIGHT FEMALE BREAST (MULTI): ICD-10-CM

## 2024-07-17 DIAGNOSIS — Z51.0 ENCOUNTER FOR ANTINEOPLASTIC RADIATION THERAPY: ICD-10-CM

## 2024-07-17 LAB
RAD ONC MSQ ACTUAL FRACTIONS DELIVERED: 3
RAD ONC MSQ ACTUAL SESSION DELIVERED DOSE: 266 CGRAY
RAD ONC MSQ ACTUAL TOTAL DOSE: 798 CGRAY
RAD ONC MSQ ELAPSED DAYS: 2
RAD ONC MSQ LAST DATE: NORMAL
RAD ONC MSQ PRESCRIBED FRACTIONAL DOSE: 266 CGRAY
RAD ONC MSQ PRESCRIBED NUMBER OF FRACTIONS: 16
RAD ONC MSQ PRESCRIBED TECHNIQUE: NORMAL
RAD ONC MSQ PRESCRIBED TOTAL DOSE: 4256 CGRAY
RAD ONC MSQ PRESCRIPTION PATTERN COMMENT: NORMAL
RAD ONC MSQ START DATE: NORMAL
RAD ONC MSQ TREATMENT COURSE NUMBER: 1
RAD ONC MSQ TREATMENT SITE: NORMAL

## 2024-07-17 PROCEDURE — 77385 HC INTENSITY-MODULATED RADIATION THERAPY (IMRT), SIMPLE: CPT | Performed by: RADIOLOGY

## 2024-07-17 PROCEDURE — 77336 RADIATION PHYSICS CONSULT: CPT | Performed by: RADIOLOGY

## 2024-07-18 ENCOUNTER — HOSPITAL ENCOUNTER (OUTPATIENT)
Dept: RADIATION ONCOLOGY | Facility: CLINIC | Age: 39
Setting detail: RADIATION/ONCOLOGY SERIES
Discharge: HOME | End: 2024-07-18
Payer: COMMERCIAL

## 2024-07-18 DIAGNOSIS — C77.3 SECONDARY AND UNSPECIFIED MALIGNANT NEOPLASM OF AXILLA AND UPPER LIMB LYMPH NODES (MULTI): ICD-10-CM

## 2024-07-18 DIAGNOSIS — C50.411 MALIGNANT NEOPLASM OF UPPER-OUTER QUADRANT OF RIGHT FEMALE BREAST (MULTI): ICD-10-CM

## 2024-07-18 DIAGNOSIS — Z51.0 ENCOUNTER FOR ANTINEOPLASTIC RADIATION THERAPY: ICD-10-CM

## 2024-07-18 LAB
RAD ONC MSQ ACTUAL FRACTIONS DELIVERED: 4
RAD ONC MSQ ACTUAL SESSION DELIVERED DOSE: 266 CGRAY
RAD ONC MSQ ACTUAL TOTAL DOSE: 1064 CGRAY
RAD ONC MSQ ELAPSED DAYS: 3
RAD ONC MSQ LAST DATE: NORMAL
RAD ONC MSQ PRESCRIBED FRACTIONAL DOSE: 266 CGRAY
RAD ONC MSQ PRESCRIBED NUMBER OF FRACTIONS: 16
RAD ONC MSQ PRESCRIBED TECHNIQUE: NORMAL
RAD ONC MSQ PRESCRIBED TOTAL DOSE: 4256 CGRAY
RAD ONC MSQ PRESCRIPTION PATTERN COMMENT: NORMAL
RAD ONC MSQ START DATE: NORMAL
RAD ONC MSQ TREATMENT COURSE NUMBER: 1
RAD ONC MSQ TREATMENT SITE: NORMAL

## 2024-07-18 PROCEDURE — 77385 HC INTENSITY-MODULATED RADIATION THERAPY (IMRT), SIMPLE: CPT | Performed by: RADIOLOGY

## 2024-07-19 ENCOUNTER — HOSPITAL ENCOUNTER (OUTPATIENT)
Dept: RADIATION ONCOLOGY | Facility: CLINIC | Age: 39
Setting detail: RADIATION/ONCOLOGY SERIES
Discharge: HOME | End: 2024-07-19
Payer: COMMERCIAL

## 2024-07-19 DIAGNOSIS — C50.411 MALIGNANT NEOPLASM OF UPPER-OUTER QUADRANT OF RIGHT FEMALE BREAST (MULTI): ICD-10-CM

## 2024-07-19 DIAGNOSIS — Z51.0 ENCOUNTER FOR ANTINEOPLASTIC RADIATION THERAPY: ICD-10-CM

## 2024-07-19 DIAGNOSIS — C77.3 SECONDARY AND UNSPECIFIED MALIGNANT NEOPLASM OF AXILLA AND UPPER LIMB LYMPH NODES (MULTI): ICD-10-CM

## 2024-07-19 LAB
RAD ONC MSQ ACTUAL FRACTIONS DELIVERED: 5
RAD ONC MSQ ACTUAL SESSION DELIVERED DOSE: 266 CGRAY
RAD ONC MSQ ACTUAL TOTAL DOSE: 1330 CGRAY
RAD ONC MSQ ELAPSED DAYS: 4
RAD ONC MSQ LAST DATE: NORMAL
RAD ONC MSQ PRESCRIBED FRACTIONAL DOSE: 266 CGRAY
RAD ONC MSQ PRESCRIBED NUMBER OF FRACTIONS: 16
RAD ONC MSQ PRESCRIBED TECHNIQUE: NORMAL
RAD ONC MSQ PRESCRIBED TOTAL DOSE: 4256 CGRAY
RAD ONC MSQ PRESCRIPTION PATTERN COMMENT: NORMAL
RAD ONC MSQ START DATE: NORMAL
RAD ONC MSQ TREATMENT COURSE NUMBER: 1
RAD ONC MSQ TREATMENT SITE: NORMAL

## 2024-07-19 PROCEDURE — 77385 HC INTENSITY-MODULATED RADIATION THERAPY (IMRT), SIMPLE: CPT | Performed by: RADIOLOGY

## 2024-07-22 ENCOUNTER — PATIENT MESSAGE (OUTPATIENT)
Dept: SURGICAL ONCOLOGY | Facility: CLINIC | Age: 39
End: 2024-07-22
Payer: COMMERCIAL

## 2024-07-22 ENCOUNTER — HOSPITAL ENCOUNTER (OUTPATIENT)
Dept: RADIATION ONCOLOGY | Facility: CLINIC | Age: 39
Setting detail: RADIATION/ONCOLOGY SERIES
Discharge: HOME | End: 2024-07-22
Payer: COMMERCIAL

## 2024-07-22 DIAGNOSIS — C50.411 MALIGNANT NEOPLASM OF UPPER-OUTER QUADRANT OF RIGHT FEMALE BREAST (MULTI): ICD-10-CM

## 2024-07-22 DIAGNOSIS — C77.3 SECONDARY AND UNSPECIFIED MALIGNANT NEOPLASM OF AXILLA AND UPPER LIMB LYMPH NODES (MULTI): ICD-10-CM

## 2024-07-22 DIAGNOSIS — Z17.0 MALIGNANT NEOPLASM OF UPPER-OUTER QUADRANT OF RIGHT BREAST IN FEMALE, ESTROGEN RECEPTOR POSITIVE (MULTI): ICD-10-CM

## 2024-07-22 DIAGNOSIS — C50.411 MALIGNANT NEOPLASM OF UPPER-OUTER QUADRANT OF RIGHT BREAST IN FEMALE, ESTROGEN RECEPTOR POSITIVE (MULTI): ICD-10-CM

## 2024-07-22 DIAGNOSIS — Z51.0 ENCOUNTER FOR ANTINEOPLASTIC RADIATION THERAPY: ICD-10-CM

## 2024-07-22 LAB
RAD ONC MSQ ACTUAL FRACTIONS DELIVERED: 6
RAD ONC MSQ ACTUAL SESSION DELIVERED DOSE: 266 CGRAY
RAD ONC MSQ ACTUAL TOTAL DOSE: 1596 CGRAY
RAD ONC MSQ ELAPSED DAYS: 7
RAD ONC MSQ LAST DATE: NORMAL
RAD ONC MSQ PRESCRIBED FRACTIONAL DOSE: 266 CGRAY
RAD ONC MSQ PRESCRIBED NUMBER OF FRACTIONS: 16
RAD ONC MSQ PRESCRIBED TECHNIQUE: NORMAL
RAD ONC MSQ PRESCRIBED TOTAL DOSE: 4256 CGRAY
RAD ONC MSQ PRESCRIPTION PATTERN COMMENT: NORMAL
RAD ONC MSQ START DATE: NORMAL
RAD ONC MSQ TREATMENT COURSE NUMBER: 1
RAD ONC MSQ TREATMENT SITE: NORMAL

## 2024-07-22 PROCEDURE — 77385 HC INTENSITY-MODULATED RADIATION THERAPY (IMRT), SIMPLE: CPT | Performed by: RADIOLOGY

## 2024-07-23 ENCOUNTER — RADIATION ONCOLOGY OTV (OUTPATIENT)
Dept: RADIATION ONCOLOGY | Facility: CLINIC | Age: 39
End: 2024-07-23
Payer: COMMERCIAL

## 2024-07-23 ENCOUNTER — HOSPITAL ENCOUNTER (OUTPATIENT)
Dept: RADIATION ONCOLOGY | Facility: CLINIC | Age: 39
Setting detail: RADIATION/ONCOLOGY SERIES
Discharge: HOME | End: 2024-07-23
Payer: COMMERCIAL

## 2024-07-23 VITALS — BODY MASS INDEX: 36.85 KG/M2 | WEIGHT: 228.29 LBS | TEMPERATURE: 98.1 F

## 2024-07-23 DIAGNOSIS — C77.3 SECONDARY AND UNSPECIFIED MALIGNANT NEOPLASM OF AXILLA AND UPPER LIMB LYMPH NODES (MULTI): ICD-10-CM

## 2024-07-23 DIAGNOSIS — Z51.0 ENCOUNTER FOR ANTINEOPLASTIC RADIATION THERAPY: ICD-10-CM

## 2024-07-23 DIAGNOSIS — C50.411 MALIGNANT NEOPLASM OF UPPER-OUTER QUADRANT OF RIGHT FEMALE BREAST (MULTI): ICD-10-CM

## 2024-07-23 LAB
RAD ONC MSQ ACTUAL FRACTIONS DELIVERED: 7
RAD ONC MSQ ACTUAL SESSION DELIVERED DOSE: 266 CGRAY
RAD ONC MSQ ACTUAL TOTAL DOSE: 1862 CGRAY
RAD ONC MSQ ELAPSED DAYS: 8
RAD ONC MSQ LAST DATE: NORMAL
RAD ONC MSQ PRESCRIBED FRACTIONAL DOSE: 266 CGRAY
RAD ONC MSQ PRESCRIBED NUMBER OF FRACTIONS: 16
RAD ONC MSQ PRESCRIBED TECHNIQUE: NORMAL
RAD ONC MSQ PRESCRIBED TOTAL DOSE: 4256 CGRAY
RAD ONC MSQ PRESCRIPTION PATTERN COMMENT: NORMAL
RAD ONC MSQ START DATE: NORMAL
RAD ONC MSQ TREATMENT COURSE NUMBER: 1
RAD ONC MSQ TREATMENT SITE: NORMAL

## 2024-07-23 PROCEDURE — 77385 HC INTENSITY-MODULATED RADIATION THERAPY (IMRT), SIMPLE: CPT | Performed by: RADIOLOGY

## 2024-07-23 ASSESSMENT — PAIN SCALES - GENERAL: PAINLEVEL: 0-NO PAIN

## 2024-07-23 NOTE — PROGRESS NOTES
Radiation Oncology On Treatment Visit    Patient Name:  Luz Rojo  MRN:  [id number]  :  1985    Referring Provider: No ref. provider found  Primary Care Provider: Richa Naranjo MD  Care Team: Patient Care Team:  Richa Naranjo MD as PCP - General  Richa Naranjo MD as PCP - [facility] PCP  MD Ban Gonzalez MD as Consulting Physician (Hematology and Oncology)  MEMO Gee as  (Oncology)    Date of Service: 2024     Diagnosis:   Specialty Problems          Radiation Oncology Problems    Malignant neoplasm of upper-outer quadrant of right breast in female, estrogen receptor positive (Multi)         Treatment Summary:  3D CRT: Right Breast with lymph nodes    Treatment Period Technique Fraction Dose Fractions Total Dose   Course 1 7/15/2024-2024  (days elapsed: 8)         BREAST_RNI_R 7/15/2024-2024 VMAT 266 / 266 cGy  1862 / 4,256 cGy     SUBJECTIVE: Patient tolerating radiation treatment without complaint. No new changes this week. Denies swelling, skin changes, pain or pruritus. More fatigue in the evening. Reviewed skin care recommendations with patient. Toxicity as noted below.            OBJECTIVE:   Vital Signs:  Temp 36.7 °C (98.1 °F)   Wt 104 kg (228 lb 4.6 oz)   BMI 36.85 kg/m²    Pain Scale: The patient's current pain level was assessed.  They report currently having a pain of 0 out of 10.    Other Pertinent Findings:     Toxicity Assessment          2024    08:00 2024    07:00   Toxicity Assessment   Adverse Events Reviewed (WDL) Yes (Within Defined Limits) Yes (Within Defined Limits)   Treatment Site Breast Breast   Anorexia Grade 0 Grade 0   Dehydration Grade 0 Grade 0   Dermatitis Radiation Grade 0 Grade 0   Fatigue Grade 0 Grade 0       more tired in the evenings   Nausea Grade 0 Grade 0   Pain Grade 0 Grade 0   Breast Pain Grade 0 Grade 0        Assessment / Plan:  The patient is tolerating radiation  therapy as anticipated.  Continue per current treatment plan.       Gómez Richter MD, PhD  Attending Physician

## 2024-07-24 ENCOUNTER — HOSPITAL ENCOUNTER (OUTPATIENT)
Dept: RADIATION ONCOLOGY | Facility: CLINIC | Age: 39
Setting detail: RADIATION/ONCOLOGY SERIES
Discharge: HOME | End: 2024-07-24
Payer: COMMERCIAL

## 2024-07-24 DIAGNOSIS — Z51.0 ENCOUNTER FOR ANTINEOPLASTIC RADIATION THERAPY: ICD-10-CM

## 2024-07-24 DIAGNOSIS — C77.3 SECONDARY AND UNSPECIFIED MALIGNANT NEOPLASM OF AXILLA AND UPPER LIMB LYMPH NODES (MULTI): ICD-10-CM

## 2024-07-24 DIAGNOSIS — C50.411 MALIGNANT NEOPLASM OF UPPER-OUTER QUADRANT OF RIGHT FEMALE BREAST (MULTI): ICD-10-CM

## 2024-07-24 LAB
RAD ONC MSQ ACTUAL FRACTIONS DELIVERED: 8
RAD ONC MSQ ACTUAL SESSION DELIVERED DOSE: 266 CGRAY
RAD ONC MSQ ACTUAL TOTAL DOSE: 2128 CGRAY
RAD ONC MSQ ELAPSED DAYS: 9
RAD ONC MSQ LAST DATE: NORMAL
RAD ONC MSQ PRESCRIBED FRACTIONAL DOSE: 266 CGRAY
RAD ONC MSQ PRESCRIBED NUMBER OF FRACTIONS: 16
RAD ONC MSQ PRESCRIBED TECHNIQUE: NORMAL
RAD ONC MSQ PRESCRIBED TOTAL DOSE: 4256 CGRAY
RAD ONC MSQ PRESCRIPTION PATTERN COMMENT: NORMAL
RAD ONC MSQ START DATE: NORMAL
RAD ONC MSQ TREATMENT COURSE NUMBER: 1
RAD ONC MSQ TREATMENT SITE: NORMAL

## 2024-07-24 PROCEDURE — 77336 RADIATION PHYSICS CONSULT: CPT | Performed by: RADIOLOGY

## 2024-07-24 PROCEDURE — 77385 HC INTENSITY-MODULATED RADIATION THERAPY (IMRT), SIMPLE: CPT | Performed by: RADIOLOGY

## 2024-07-25 ENCOUNTER — PATIENT MESSAGE (OUTPATIENT)
Dept: SURGICAL ONCOLOGY | Facility: CLINIC | Age: 39
End: 2024-07-25
Payer: COMMERCIAL

## 2024-07-25 ENCOUNTER — HOSPITAL ENCOUNTER (OUTPATIENT)
Dept: RADIATION ONCOLOGY | Facility: CLINIC | Age: 39
Setting detail: RADIATION/ONCOLOGY SERIES
Discharge: HOME | End: 2024-07-25
Payer: COMMERCIAL

## 2024-07-25 DIAGNOSIS — C77.3 SECONDARY AND UNSPECIFIED MALIGNANT NEOPLASM OF AXILLA AND UPPER LIMB LYMPH NODES (MULTI): ICD-10-CM

## 2024-07-25 DIAGNOSIS — C50.411 MALIGNANT NEOPLASM OF UPPER-OUTER QUADRANT OF RIGHT FEMALE BREAST (MULTI): ICD-10-CM

## 2024-07-25 DIAGNOSIS — Z51.0 ENCOUNTER FOR ANTINEOPLASTIC RADIATION THERAPY: ICD-10-CM

## 2024-07-25 LAB
RAD ONC MSQ ACTUAL FRACTIONS DELIVERED: 9
RAD ONC MSQ ACTUAL SESSION DELIVERED DOSE: 266 CGRAY
RAD ONC MSQ ACTUAL TOTAL DOSE: 2394 CGRAY
RAD ONC MSQ ELAPSED DAYS: 10
RAD ONC MSQ LAST DATE: NORMAL
RAD ONC MSQ PRESCRIBED FRACTIONAL DOSE: 266 CGRAY
RAD ONC MSQ PRESCRIBED NUMBER OF FRACTIONS: 16
RAD ONC MSQ PRESCRIBED TECHNIQUE: NORMAL
RAD ONC MSQ PRESCRIBED TOTAL DOSE: 4256 CGRAY
RAD ONC MSQ PRESCRIPTION PATTERN COMMENT: NORMAL
RAD ONC MSQ START DATE: NORMAL
RAD ONC MSQ TREATMENT COURSE NUMBER: 1
RAD ONC MSQ TREATMENT SITE: NORMAL

## 2024-07-25 PROCEDURE — 77385 HC INTENSITY-MODULATED RADIATION THERAPY (IMRT), SIMPLE: CPT | Performed by: RADIOLOGY

## 2024-07-26 ENCOUNTER — HOSPITAL ENCOUNTER (OUTPATIENT)
Dept: RADIATION ONCOLOGY | Facility: CLINIC | Age: 39
Setting detail: RADIATION/ONCOLOGY SERIES
Discharge: HOME | End: 2024-07-26
Payer: COMMERCIAL

## 2024-07-26 DIAGNOSIS — Z51.0 ENCOUNTER FOR ANTINEOPLASTIC RADIATION THERAPY: ICD-10-CM

## 2024-07-26 DIAGNOSIS — C50.411 MALIGNANT NEOPLASM OF UPPER-OUTER QUADRANT OF RIGHT FEMALE BREAST (MULTI): ICD-10-CM

## 2024-07-26 DIAGNOSIS — C77.3 SECONDARY AND UNSPECIFIED MALIGNANT NEOPLASM OF AXILLA AND UPPER LIMB LYMPH NODES (MULTI): ICD-10-CM

## 2024-07-26 LAB
RAD ONC MSQ ACTUAL FRACTIONS DELIVERED: 10
RAD ONC MSQ ACTUAL SESSION DELIVERED DOSE: 266 CGRAY
RAD ONC MSQ ACTUAL TOTAL DOSE: 2660 CGRAY
RAD ONC MSQ ELAPSED DAYS: 11
RAD ONC MSQ LAST DATE: NORMAL
RAD ONC MSQ PRESCRIBED FRACTIONAL DOSE: 266 CGRAY
RAD ONC MSQ PRESCRIBED NUMBER OF FRACTIONS: 16
RAD ONC MSQ PRESCRIBED TECHNIQUE: NORMAL
RAD ONC MSQ PRESCRIBED TOTAL DOSE: 4256 CGRAY
RAD ONC MSQ PRESCRIPTION PATTERN COMMENT: NORMAL
RAD ONC MSQ START DATE: NORMAL
RAD ONC MSQ TREATMENT COURSE NUMBER: 1
RAD ONC MSQ TREATMENT SITE: NORMAL

## 2024-07-26 PROCEDURE — 77385 HC INTENSITY-MODULATED RADIATION THERAPY (IMRT), SIMPLE: CPT | Performed by: RADIOLOGY

## 2024-07-29 ENCOUNTER — HOSPITAL ENCOUNTER (OUTPATIENT)
Dept: RADIATION ONCOLOGY | Facility: CLINIC | Age: 39
Setting detail: RADIATION/ONCOLOGY SERIES
Discharge: HOME | End: 2024-07-29
Payer: COMMERCIAL

## 2024-07-29 DIAGNOSIS — C77.3 SECONDARY AND UNSPECIFIED MALIGNANT NEOPLASM OF AXILLA AND UPPER LIMB LYMPH NODES (MULTI): ICD-10-CM

## 2024-07-29 DIAGNOSIS — C50.411 MALIGNANT NEOPLASM OF UPPER-OUTER QUADRANT OF RIGHT FEMALE BREAST (MULTI): ICD-10-CM

## 2024-07-29 DIAGNOSIS — Z51.0 ENCOUNTER FOR ANTINEOPLASTIC RADIATION THERAPY: ICD-10-CM

## 2024-07-29 LAB
RAD ONC MSQ ACTUAL FRACTIONS DELIVERED: 11
RAD ONC MSQ ACTUAL SESSION DELIVERED DOSE: 266 CGRAY
RAD ONC MSQ ACTUAL TOTAL DOSE: 2926 CGRAY
RAD ONC MSQ ELAPSED DAYS: 14
RAD ONC MSQ LAST DATE: NORMAL
RAD ONC MSQ PRESCRIBED FRACTIONAL DOSE: 266 CGRAY
RAD ONC MSQ PRESCRIBED NUMBER OF FRACTIONS: 16
RAD ONC MSQ PRESCRIBED TECHNIQUE: NORMAL
RAD ONC MSQ PRESCRIBED TOTAL DOSE: 4256 CGRAY
RAD ONC MSQ PRESCRIPTION PATTERN COMMENT: NORMAL
RAD ONC MSQ START DATE: NORMAL
RAD ONC MSQ TREATMENT COURSE NUMBER: 1
RAD ONC MSQ TREATMENT SITE: NORMAL

## 2024-07-29 PROCEDURE — 77385 HC INTENSITY-MODULATED RADIATION THERAPY (IMRT), SIMPLE: CPT | Performed by: RADIOLOGY

## 2024-07-30 ENCOUNTER — RADIATION ONCOLOGY OTV (OUTPATIENT)
Dept: RADIATION ONCOLOGY | Facility: CLINIC | Age: 39
End: 2024-07-30
Payer: COMMERCIAL

## 2024-07-30 ENCOUNTER — HOSPITAL ENCOUNTER (OUTPATIENT)
Dept: RADIATION ONCOLOGY | Facility: CLINIC | Age: 39
Setting detail: RADIATION/ONCOLOGY SERIES
Discharge: HOME | End: 2024-07-30
Payer: COMMERCIAL

## 2024-07-30 VITALS — TEMPERATURE: 97.2 F | WEIGHT: 227.51 LBS | BODY MASS INDEX: 36.72 KG/M2

## 2024-07-30 DIAGNOSIS — Z51.0 ENCOUNTER FOR ANTINEOPLASTIC RADIATION THERAPY: ICD-10-CM

## 2024-07-30 DIAGNOSIS — Z17.0 MALIGNANT NEOPLASM OF UPPER-OUTER QUADRANT OF RIGHT BREAST IN FEMALE, ESTROGEN RECEPTOR POSITIVE (MULTI): Primary | ICD-10-CM

## 2024-07-30 DIAGNOSIS — C50.411 MALIGNANT NEOPLASM OF UPPER-OUTER QUADRANT OF RIGHT BREAST IN FEMALE, ESTROGEN RECEPTOR POSITIVE (MULTI): Primary | ICD-10-CM

## 2024-07-30 DIAGNOSIS — C77.3 SECONDARY AND UNSPECIFIED MALIGNANT NEOPLASM OF AXILLA AND UPPER LIMB LYMPH NODES (MULTI): ICD-10-CM

## 2024-07-30 DIAGNOSIS — C50.411 MALIGNANT NEOPLASM OF UPPER-OUTER QUADRANT OF RIGHT FEMALE BREAST (MULTI): ICD-10-CM

## 2024-07-30 LAB
RAD ONC MSQ ACTUAL FRACTIONS DELIVERED: 12
RAD ONC MSQ ACTUAL SESSION DELIVERED DOSE: 266 CGRAY
RAD ONC MSQ ACTUAL TOTAL DOSE: 3192 CGRAY
RAD ONC MSQ ELAPSED DAYS: 15
RAD ONC MSQ LAST DATE: NORMAL
RAD ONC MSQ PRESCRIBED FRACTIONAL DOSE: 266 CGRAY
RAD ONC MSQ PRESCRIBED NUMBER OF FRACTIONS: 16
RAD ONC MSQ PRESCRIBED TECHNIQUE: NORMAL
RAD ONC MSQ PRESCRIBED TOTAL DOSE: 4256 CGRAY
RAD ONC MSQ PRESCRIPTION PATTERN COMMENT: NORMAL
RAD ONC MSQ START DATE: NORMAL
RAD ONC MSQ TREATMENT COURSE NUMBER: 1
RAD ONC MSQ TREATMENT SITE: NORMAL

## 2024-07-30 PROCEDURE — 77385 HC INTENSITY-MODULATED RADIATION THERAPY (IMRT), SIMPLE: CPT | Performed by: RADIOLOGY

## 2024-07-30 RX ORDER — SILVER SULFADIAZINE 10 G/1000G
CREAM TOPICAL
Qty: 50 G | Refills: 0 | Status: SHIPPED | OUTPATIENT
Start: 2024-07-30 | End: 2025-07-30

## 2024-07-30 ASSESSMENT — PAIN SCALES - GENERAL: PAINLEVEL: 0-NO PAIN

## 2024-07-30 NOTE — PROGRESS NOTES
Radiation Oncology On Treatment Visit    Patient Name:  Luz Rojo  MRN:  [id number]  :  1985    Referring Provider: No ref. provider found  Primary Care Provider: Richa Naranjo MD  Care Team: Patient Care Team:  Richa Naranjo MD as PCP - General  Richa Naranjo MD as PCP - [facility] PCP  MD Ban Gonzalez MD as Consulting Physician (Hematology and Oncology)  MEMO Gee as  (Oncology)    Date of Service: 2024     Diagnosis:   Specialty Problems          Radiation Oncology Problems    Malignant neoplasm of upper-outer quadrant of right breast in female, estrogen receptor positive (Multi)         Treatment Summary:  3D CRT: Right Breast with lymph nodes    Treatment Period Technique Fraction Dose Fractions Total Dose   Course 1 7/15/2024-2024  (days elapsed: 15)         BREAST_RNI_R 7/15/2024-2024 VMAT 266 / 266 cGy  3192 / 4,256 cGy     SUBJECTIVE: Patient tolerating radiation treatment. Increased erythema and patchy moist desquamation under right breast. Painful when wearing a bra and intermittent pruritus. Fatigue relieved with rest and minor nausea.  Reviewed skin care recommendations with patient. Toxicity as noted below.            OBJECTIVE:   Vital Signs:  Temp 36.2 °C (97.2 °F) (Temporal)   Wt 103 kg (227 lb 8.2 oz)   BMI 36.72 kg/m²    Pain Scale: The patient's current pain level was assessed.  They report currently having a pain of 0 out of 10.    Other Pertinent Findings:     Toxicity Assessment          2024    08:00 2024    07:00 2024    07:00   Toxicity Assessment   Adverse Events Reviewed (WDL) Yes (Within Defined Limits) Yes (Within Defined Limits) Yes (Within Defined Limits)   Treatment Site Breast Breast Breast   Anorexia Grade 0 Grade 0 Grade 0   Dehydration Grade 0 Grade 0 Grade 0   Dermatitis Radiation Grade 0 Grade 0 Grade 2   Fatigue Grade 0 Grade 0       more tired in the evenings  Grade 1   Nausea Grade 0 Grade 0 Grade 0       intermittent and minor. self-resolving   Pain Grade 0 Grade 0 Grade 1       mild pain when wearing a bra   Breast Pain Grade 0 Grade 0 Grade 1        Assessment / Plan:  The patient is tolerating radiation therapy as anticipated.  Continue per current treatment plan.   Silvadene cream ordered today.    Gómez Richter MD, PhD  Attending Physician

## 2024-07-31 ENCOUNTER — HOSPITAL ENCOUNTER (OUTPATIENT)
Dept: RADIATION ONCOLOGY | Facility: CLINIC | Age: 39
Setting detail: RADIATION/ONCOLOGY SERIES
Discharge: HOME | End: 2024-07-31
Payer: COMMERCIAL

## 2024-07-31 DIAGNOSIS — C50.411 MALIGNANT NEOPLASM OF UPPER-OUTER QUADRANT OF RIGHT FEMALE BREAST (MULTI): ICD-10-CM

## 2024-07-31 DIAGNOSIS — Z51.0 ENCOUNTER FOR ANTINEOPLASTIC RADIATION THERAPY: ICD-10-CM

## 2024-07-31 DIAGNOSIS — C77.3 SECONDARY AND UNSPECIFIED MALIGNANT NEOPLASM OF AXILLA AND UPPER LIMB LYMPH NODES (MULTI): ICD-10-CM

## 2024-07-31 LAB
RAD ONC MSQ ACTUAL FRACTIONS DELIVERED: 13
RAD ONC MSQ ACTUAL SESSION DELIVERED DOSE: 266 CGRAY
RAD ONC MSQ ACTUAL TOTAL DOSE: 3458 CGRAY
RAD ONC MSQ ELAPSED DAYS: 16
RAD ONC MSQ LAST DATE: NORMAL
RAD ONC MSQ PRESCRIBED FRACTIONAL DOSE: 266 CGRAY
RAD ONC MSQ PRESCRIBED NUMBER OF FRACTIONS: 16
RAD ONC MSQ PRESCRIBED TECHNIQUE: NORMAL
RAD ONC MSQ PRESCRIBED TOTAL DOSE: 4256 CGRAY
RAD ONC MSQ PRESCRIPTION PATTERN COMMENT: NORMAL
RAD ONC MSQ START DATE: NORMAL
RAD ONC MSQ TREATMENT COURSE NUMBER: 1
RAD ONC MSQ TREATMENT SITE: NORMAL

## 2024-07-31 PROCEDURE — 77336 RADIATION PHYSICS CONSULT: CPT | Performed by: RADIOLOGY

## 2024-07-31 PROCEDURE — 77385 HC INTENSITY-MODULATED RADIATION THERAPY (IMRT), SIMPLE: CPT | Performed by: RADIOLOGY

## 2024-08-01 ENCOUNTER — HOSPITAL ENCOUNTER (OUTPATIENT)
Dept: RADIATION ONCOLOGY | Facility: CLINIC | Age: 39
Setting detail: RADIATION/ONCOLOGY SERIES
Discharge: HOME | End: 2024-08-01
Payer: COMMERCIAL

## 2024-08-01 ENCOUNTER — APPOINTMENT (OUTPATIENT)
Dept: PRIMARY CARE | Facility: CLINIC | Age: 39
End: 2024-08-01
Payer: COMMERCIAL

## 2024-08-01 DIAGNOSIS — C50.411 MALIGNANT NEOPLASM OF UPPER-OUTER QUADRANT OF RIGHT FEMALE BREAST (MULTI): ICD-10-CM

## 2024-08-01 DIAGNOSIS — C77.3 SECONDARY AND UNSPECIFIED MALIGNANT NEOPLASM OF AXILLA AND UPPER LIMB LYMPH NODES (MULTI): ICD-10-CM

## 2024-08-01 DIAGNOSIS — Z51.0 ENCOUNTER FOR ANTINEOPLASTIC RADIATION THERAPY: ICD-10-CM

## 2024-08-01 LAB
RAD ONC MSQ ACTUAL FRACTIONS DELIVERED: 14
RAD ONC MSQ ACTUAL SESSION DELIVERED DOSE: 266 CGRAY
RAD ONC MSQ ACTUAL TOTAL DOSE: 3724 CGRAY
RAD ONC MSQ ELAPSED DAYS: 17
RAD ONC MSQ LAST DATE: NORMAL
RAD ONC MSQ PRESCRIBED FRACTIONAL DOSE: 266 CGRAY
RAD ONC MSQ PRESCRIBED NUMBER OF FRACTIONS: 16
RAD ONC MSQ PRESCRIBED TECHNIQUE: NORMAL
RAD ONC MSQ PRESCRIBED TOTAL DOSE: 4256 CGRAY
RAD ONC MSQ PRESCRIPTION PATTERN COMMENT: NORMAL
RAD ONC MSQ START DATE: NORMAL
RAD ONC MSQ TREATMENT COURSE NUMBER: 1
RAD ONC MSQ TREATMENT SITE: NORMAL

## 2024-08-01 PROCEDURE — 77385 HC INTENSITY-MODULATED RADIATION THERAPY (IMRT), SIMPLE: CPT | Performed by: RADIOLOGY

## 2024-08-02 ENCOUNTER — APPOINTMENT (OUTPATIENT)
Dept: RADIATION ONCOLOGY | Facility: CLINIC | Age: 39
End: 2024-08-02
Payer: COMMERCIAL

## 2024-08-05 ENCOUNTER — APPOINTMENT (OUTPATIENT)
Dept: RADIATION ONCOLOGY | Facility: CLINIC | Age: 39
End: 2024-08-05
Payer: COMMERCIAL

## 2024-08-06 ENCOUNTER — HOSPITAL ENCOUNTER (OUTPATIENT)
Dept: RADIATION ONCOLOGY | Facility: CLINIC | Age: 39
Setting detail: RADIATION/ONCOLOGY SERIES
Discharge: HOME | End: 2024-08-06
Payer: COMMERCIAL

## 2024-08-06 ENCOUNTER — RADIATION ONCOLOGY OTV (OUTPATIENT)
Dept: RADIATION ONCOLOGY | Facility: CLINIC | Age: 39
End: 2024-08-06
Payer: COMMERCIAL

## 2024-08-06 DIAGNOSIS — C50.411 MALIGNANT NEOPLASM OF UPPER-OUTER QUADRANT OF RIGHT BREAST IN FEMALE, ESTROGEN RECEPTOR POSITIVE (MULTI): Primary | ICD-10-CM

## 2024-08-06 DIAGNOSIS — Z17.0 MALIGNANT NEOPLASM OF UPPER-OUTER QUADRANT OF RIGHT BREAST IN FEMALE, ESTROGEN RECEPTOR POSITIVE (MULTI): Primary | ICD-10-CM

## 2024-08-06 RX ORDER — BENZONATATE 100 MG/1
100 CAPSULE ORAL 3 TIMES DAILY PRN
Qty: 20 CAPSULE | Refills: 0 | Status: SHIPPED | OUTPATIENT
Start: 2024-08-06 | End: 2024-08-13

## 2024-08-06 RX ORDER — AZITHROMYCIN 250 MG/1
TABLET, FILM COATED ORAL
Qty: 6 EACH | Refills: 0 | Status: SHIPPED | OUTPATIENT
Start: 2024-08-06 | End: 2024-08-11

## 2024-08-06 NOTE — PROGRESS NOTES
Radiation Oncology On Treatment Visit    Patient Name:  Luz Rojo  MRN:  [id number]  :  1985    Referring Provider: No ref. provider found  Primary Care Provider: Richa Naranjo MD  Care Team: Patient Care Team:  Richa Naranjo MD as PCP - General  Richa Naranjo MD as PCP - [facility] PCP  MD Ban Gonzalez MD as Consulting Physician (Hematology and Oncology)  MEMO Gee as  (Oncology)    Date of Service: 2024     Diagnosis:   Specialty Problems          Radiation Oncology Problems    Malignant neoplasm of upper-outer quadrant of right breast in female, estrogen receptor positive (Multi)         Treatment Summary:  3D CRT: Right Breast with lymph nodes    Treatment Period Technique Fraction Dose Fractions Total Dose   Course 1 7/15/2024-2024  (days elapsed: 24)         BREAST_RNI_R 7/15/2024-2024 VMAT 266 / 266 cGy 15 / 16 3990 / 4,256 cGy     SUBJECTIVE: Patient tolerating radiation treatment without complaint. She does have some confluent moist desquamation and pain. Mild fatigue, otherwise no complaints. Toxicity as noted below        OBJECTIVE:   Vital Signs:  There were no vitals taken for this visit.   Pain Scale: The patient's current pain level was assessed.  They report currently having a pain of 1 out of 10.    Other Pertinent Findings:     Toxicity Assessment          2024    08:00 2024    07:00 2024    07:00 2024    10:00   Toxicity Assessment   Adverse Events Reviewed (WDL) Yes (Within Defined Limits) Yes (Within Defined Limits) Yes (Within Defined Limits) Yes (Within Defined Limits)   Treatment Site Breast Breast Breast Breast   Anorexia Grade 0 Grade 0 Grade 0 Grade 0   Dehydration Grade 0 Grade 0 Grade 0 Grade 0   Dermatitis Radiation Grade 0 Grade 0 Grade 2 Grade 2   Fatigue Grade 0 Grade 0       more tired in the evenings Grade 1 Grade 1   Nausea Grade 0 Grade 0 Grade 0       intermittent and  minor. self-resolving Grade 1       intermittent   Pain Grade 0 Grade 0 Grade 1       mild pain when wearing a bra Grade 0   Breast Pain Grade 0 Grade 0 Grade 1 Grade 0        Assessment / Plan:  The patient is tolerating radiation therapy as anticipated.  Continue per current treatment plan.   Silvadene ordered and skin care discussed.    Gómez Richter MD, PhD  Attending Physician

## 2024-08-07 ENCOUNTER — APPOINTMENT (OUTPATIENT)
Dept: RADIATION ONCOLOGY | Facility: CLINIC | Age: 39
End: 2024-08-07
Payer: COMMERCIAL

## 2024-08-08 ENCOUNTER — HOSPITAL ENCOUNTER (OUTPATIENT)
Dept: RADIATION ONCOLOGY | Facility: HOSPITAL | Age: 39
Setting detail: RADIATION/ONCOLOGY SERIES
Discharge: HOME | End: 2024-08-08
Payer: COMMERCIAL

## 2024-08-08 ENCOUNTER — APPOINTMENT (OUTPATIENT)
Dept: RADIATION ONCOLOGY | Facility: CLINIC | Age: 39
End: 2024-08-08
Payer: COMMERCIAL

## 2024-08-08 DIAGNOSIS — C50.411 MALIGNANT NEOPLASM OF UPPER-OUTER QUADRANT OF RIGHT FEMALE BREAST (MULTI): ICD-10-CM

## 2024-08-08 DIAGNOSIS — C77.3 SECONDARY AND UNSPECIFIED MALIGNANT NEOPLASM OF AXILLA AND UPPER LIMB LYMPH NODES (MULTI): ICD-10-CM

## 2024-08-08 DIAGNOSIS — Z51.0 ENCOUNTER FOR ANTINEOPLASTIC RADIATION THERAPY: ICD-10-CM

## 2024-08-08 LAB
RAD ONC MSQ ACTUAL FRACTIONS DELIVERED: 15
RAD ONC MSQ ACTUAL SESSION DELIVERED DOSE: 266 CGRAY
RAD ONC MSQ ACTUAL TOTAL DOSE: 3990 CGRAY
RAD ONC MSQ ELAPSED DAYS: 24
RAD ONC MSQ LAST DATE: NORMAL
RAD ONC MSQ PRESCRIBED FRACTIONAL DOSE: 266 CGRAY
RAD ONC MSQ PRESCRIBED NUMBER OF FRACTIONS: 16
RAD ONC MSQ PRESCRIBED TECHNIQUE: NORMAL
RAD ONC MSQ PRESCRIBED TOTAL DOSE: 4256 CGRAY
RAD ONC MSQ PRESCRIPTION PATTERN COMMENT: NORMAL
RAD ONC MSQ START DATE: NORMAL
RAD ONC MSQ TREATMENT COURSE NUMBER: 1
RAD ONC MSQ TREATMENT SITE: NORMAL

## 2024-08-08 PROCEDURE — 77336 RADIATION PHYSICS CONSULT: CPT | Performed by: RADIOLOGY

## 2024-08-08 PROCEDURE — 77385 HC INTENSITY-MODULATED RADIATION THERAPY (IMRT), SIMPLE: CPT | Performed by: RADIOLOGY

## 2024-08-09 ENCOUNTER — APPOINTMENT (OUTPATIENT)
Dept: RADIATION ONCOLOGY | Facility: CLINIC | Age: 39
End: 2024-08-09
Payer: COMMERCIAL

## 2024-08-09 ENCOUNTER — HOSPITAL ENCOUNTER (OUTPATIENT)
Dept: RADIATION ONCOLOGY | Facility: HOSPITAL | Age: 39
Setting detail: RADIATION/ONCOLOGY SERIES
Discharge: HOME | End: 2024-08-09
Payer: COMMERCIAL

## 2024-08-09 DIAGNOSIS — C77.3 SECONDARY AND UNSPECIFIED MALIGNANT NEOPLASM OF AXILLA AND UPPER LIMB LYMPH NODES (MULTI): ICD-10-CM

## 2024-08-09 DIAGNOSIS — Z51.0 ENCOUNTER FOR ANTINEOPLASTIC RADIATION THERAPY: ICD-10-CM

## 2024-08-09 DIAGNOSIS — C50.411 MALIGNANT NEOPLASM OF UPPER-OUTER QUADRANT OF RIGHT FEMALE BREAST (MULTI): ICD-10-CM

## 2024-08-09 LAB
RAD ONC MSQ ACTUAL FRACTIONS DELIVERED: 16
RAD ONC MSQ ACTUAL SESSION DELIVERED DOSE: 266 CGRAY
RAD ONC MSQ ACTUAL TOTAL DOSE: 4256 CGRAY
RAD ONC MSQ ELAPSED DAYS: 25
RAD ONC MSQ LAST DATE: NORMAL
RAD ONC MSQ PRESCRIBED FRACTIONAL DOSE: 266 CGRAY
RAD ONC MSQ PRESCRIBED NUMBER OF FRACTIONS: 16
RAD ONC MSQ PRESCRIBED TECHNIQUE: NORMAL
RAD ONC MSQ PRESCRIBED TOTAL DOSE: 4256 CGRAY
RAD ONC MSQ PRESCRIPTION PATTERN COMMENT: NORMAL
RAD ONC MSQ START DATE: NORMAL
RAD ONC MSQ TREATMENT COURSE NUMBER: 1
RAD ONC MSQ TREATMENT SITE: NORMAL

## 2024-08-09 PROCEDURE — 77385 HC INTENSITY-MODULATED RADIATION THERAPY (IMRT), SIMPLE: CPT | Performed by: RADIOLOGY

## 2024-08-12 ENCOUNTER — APPOINTMENT (OUTPATIENT)
Dept: RADIATION ONCOLOGY | Facility: CLINIC | Age: 39
End: 2024-08-12
Payer: COMMERCIAL

## 2024-08-12 ENCOUNTER — APPOINTMENT (OUTPATIENT)
Dept: RADIATION ONCOLOGY | Facility: HOSPITAL | Age: 39
End: 2024-08-12
Payer: COMMERCIAL

## 2024-08-12 ENCOUNTER — HOSPITAL ENCOUNTER (OUTPATIENT)
Dept: RADIATION ONCOLOGY | Facility: HOSPITAL | Age: 39
Setting detail: RADIATION/ONCOLOGY SERIES
Discharge: HOME | End: 2024-08-12
Payer: COMMERCIAL

## 2024-08-12 DIAGNOSIS — C50.411 MALIGNANT NEOPLASM OF UPPER-OUTER QUADRANT OF RIGHT FEMALE BREAST (MULTI): ICD-10-CM

## 2024-08-12 DIAGNOSIS — C77.3 SECONDARY AND UNSPECIFIED MALIGNANT NEOPLASM OF AXILLA AND UPPER LIMB LYMPH NODES (MULTI): ICD-10-CM

## 2024-08-12 DIAGNOSIS — Z51.0 ENCOUNTER FOR ANTINEOPLASTIC RADIATION THERAPY: ICD-10-CM

## 2024-08-12 LAB
RAD ONC MSQ ACTUAL FRACTIONS DELIVERED: 1
RAD ONC MSQ ACTUAL SESSION DELIVERED DOSE: 250 CGRAY
RAD ONC MSQ ACTUAL TOTAL DOSE: 250 CGRAY
RAD ONC MSQ ELAPSED DAYS: 0
RAD ONC MSQ LAST DATE: NORMAL
RAD ONC MSQ PRESCRIBED FRACTIONAL DOSE: 250 CGRAY
RAD ONC MSQ PRESCRIBED NUMBER OF FRACTIONS: 4
RAD ONC MSQ PRESCRIBED TECHNIQUE: NORMAL
RAD ONC MSQ PRESCRIBED TOTAL DOSE: 1000 CGRAY
RAD ONC MSQ PRESCRIPTION PATTERN COMMENT: NORMAL
RAD ONC MSQ START DATE: NORMAL
RAD ONC MSQ TREATMENT COURSE NUMBER: 1
RAD ONC MSQ TREATMENT SITE: NORMAL

## 2024-08-12 PROCEDURE — 77385 HC INTENSITY-MODULATED RADIATION THERAPY (IMRT), SIMPLE: CPT | Performed by: RADIOLOGY

## 2024-08-13 ENCOUNTER — RADIATION ONCOLOGY OTV (OUTPATIENT)
Dept: RADIATION ONCOLOGY | Facility: CLINIC | Age: 39
End: 2024-08-13

## 2024-08-13 ENCOUNTER — APPOINTMENT (OUTPATIENT)
Dept: RADIATION ONCOLOGY | Facility: HOSPITAL | Age: 39
End: 2024-08-13
Payer: COMMERCIAL

## 2024-08-13 ENCOUNTER — APPOINTMENT (OUTPATIENT)
Dept: RADIATION ONCOLOGY | Facility: CLINIC | Age: 39
End: 2024-08-13
Payer: COMMERCIAL

## 2024-08-13 ENCOUNTER — HOSPITAL ENCOUNTER (OUTPATIENT)
Dept: RADIATION ONCOLOGY | Facility: CLINIC | Age: 39
Setting detail: RADIATION/ONCOLOGY SERIES
Discharge: HOME | End: 2024-08-13
Payer: COMMERCIAL

## 2024-08-13 VITALS
SYSTOLIC BLOOD PRESSURE: 137 MMHG | RESPIRATION RATE: 16 BRPM | TEMPERATURE: 97.9 F | OXYGEN SATURATION: 96 % | HEART RATE: 85 BPM | DIASTOLIC BLOOD PRESSURE: 85 MMHG

## 2024-08-13 DIAGNOSIS — C77.3 SECONDARY AND UNSPECIFIED MALIGNANT NEOPLASM OF AXILLA AND UPPER LIMB LYMPH NODES (MULTI): ICD-10-CM

## 2024-08-13 DIAGNOSIS — Z51.0 ENCOUNTER FOR ANTINEOPLASTIC RADIATION THERAPY: ICD-10-CM

## 2024-08-13 DIAGNOSIS — C50.411 MALIGNANT NEOPLASM OF UPPER-OUTER QUADRANT OF RIGHT FEMALE BREAST (MULTI): ICD-10-CM

## 2024-08-13 LAB
RAD ONC MSQ ACTUAL FRACTIONS DELIVERED: 2
RAD ONC MSQ ACTUAL SESSION DELIVERED DOSE: 250 CGRAY
RAD ONC MSQ ACTUAL TOTAL DOSE: 500 CGRAY
RAD ONC MSQ ELAPSED DAYS: 1
RAD ONC MSQ LAST DATE: NORMAL
RAD ONC MSQ PRESCRIBED FRACTIONAL DOSE: 250 CGRAY
RAD ONC MSQ PRESCRIBED NUMBER OF FRACTIONS: 4
RAD ONC MSQ PRESCRIBED TECHNIQUE: NORMAL
RAD ONC MSQ PRESCRIBED TOTAL DOSE: 1000 CGRAY
RAD ONC MSQ PRESCRIPTION PATTERN COMMENT: NORMAL
RAD ONC MSQ START DATE: NORMAL
RAD ONC MSQ TREATMENT COURSE NUMBER: 1
RAD ONC MSQ TREATMENT SITE: NORMAL

## 2024-08-13 PROCEDURE — 77385 HC INTENSITY-MODULATED RADIATION THERAPY (IMRT), SIMPLE: CPT | Performed by: RADIOLOGY

## 2024-08-13 ASSESSMENT — PAIN SCALES - GENERAL: PAINLEVEL: 0-NO PAIN

## 2024-08-13 NOTE — PROGRESS NOTES
Radiation Oncology On Treatment Visit    Patient Name:  Luz Rojo  MRN:  [id number]  :  1985    Referring Provider: No ref. provider found  Primary Care Provider: Richa Naranjo MD  Care Team: Patient Care Team:  Richa Naranjo MD as PCP - General  Richa Naranjo MD as PCP - [facility] PCP  MD Ban Gonzalez MD as Consulting Physician (Hematology and Oncology)  MEMO Gee as  (Oncology)    Date of Service: 2024     Diagnosis:   Specialty Problems          Radiation Oncology Problems    Malignant neoplasm of upper-outer quadrant of right breast in female, estrogen receptor positive (Multi)         Treatment Summary:  3D CRT: Right Breast with lymph nodes    Treatment Period Technique Fraction Dose Fractions Total Dose   Course 1 7/15/2024-2024  (days elapsed: 29)         BREAST_RNI_R 7/15/2024-2024 VMAT 266 / 266 cGy  4256 / 4,256 cGy         TB_BOOST_R 2024-2024 VMAT 250 / 250 cGy 2 /  500 / 1,000 cGy     SUBJECTIVE: Patient denies pain today, mild fatigue, skin irritation noted under rt breast, healing with silvadene. Good ROM to right shoulder.      OBJECTIVE:   Vital Signs:  /85   Pulse 85   Temp 36.6 °C (97.9 °F) (Temporal)   Resp 16   SpO2 96% Comment: room air   Pain Scale: The patient's current pain level was assessed.  They report currently having a pain of 0 out of 10.    Other Pertinent Findings:     Toxicity Assessment          2024    08:00 2024    07:00 2024    07:00 2024    10:00 2024    07:00   Toxicity Assessment   Adverse Events Reviewed (WDL) Yes (Within Defined Limits) Yes (Within Defined Limits) Yes (Within Defined Limits) Yes (Within Defined Limits) Yes (Within Defined Limits)   Treatment Site Breast Breast Breast Breast Breast   Anorexia Grade 0 Grade 0 Grade 0 Grade 0 Grade 0   Dehydration Grade 0 Grade 0 Grade 0 Grade 0    Dermatitis Radiation Grade 0 Grade 0  Grade 2 Grade 2 Grade 2       Silvedine, utter cream,   Diarrhea     Grade 0   Fatigue Grade 0 Grade 0       more tired in the evenings Grade 1 Grade 1 Grade 1   Nausea Grade 0 Grade 0 Grade 0       intermittent and minor. self-resolving Grade 1       intermittent Grade 0   Pain Grade 0 Grade 0 Grade 1       mild pain when wearing a bra Grade 0 Grade 0   Breast Infection     Grade 0   Joint Range of Motion Decreased     Grade 0   Joint Range of Motion Decreased Lumbar Spine     Grade 0   Breast Pain Grade 0 Grade 0 Grade 1 Grade 0 Grade 0   Lymphedema     Grade 0       ROM good, seeing OT in September        Assessment / Plan:  The patient is tolerating radiation therapy as anticipated.  Continue per current treatment plan.       Gómez Richter MD, PhD  Attending Physician

## 2024-08-14 ENCOUNTER — HOSPITAL ENCOUNTER (OUTPATIENT)
Dept: RADIATION ONCOLOGY | Facility: CLINIC | Age: 39
Setting detail: RADIATION/ONCOLOGY SERIES
Discharge: HOME | End: 2024-08-14
Payer: COMMERCIAL

## 2024-08-14 ENCOUNTER — APPOINTMENT (OUTPATIENT)
Dept: RADIATION ONCOLOGY | Facility: CLINIC | Age: 39
End: 2024-08-14
Payer: COMMERCIAL

## 2024-08-14 DIAGNOSIS — C50.411 MALIGNANT NEOPLASM OF UPPER-OUTER QUADRANT OF RIGHT FEMALE BREAST (MULTI): ICD-10-CM

## 2024-08-14 DIAGNOSIS — C77.3 SECONDARY AND UNSPECIFIED MALIGNANT NEOPLASM OF AXILLA AND UPPER LIMB LYMPH NODES (MULTI): ICD-10-CM

## 2024-08-14 DIAGNOSIS — Z51.0 ENCOUNTER FOR ANTINEOPLASTIC RADIATION THERAPY: ICD-10-CM

## 2024-08-14 LAB
RAD ONC MSQ ACTUAL FRACTIONS DELIVERED: 3
RAD ONC MSQ ACTUAL SESSION DELIVERED DOSE: 250 CGRAY
RAD ONC MSQ ACTUAL TOTAL DOSE: 750 CGRAY
RAD ONC MSQ ELAPSED DAYS: 2
RAD ONC MSQ LAST DATE: NORMAL
RAD ONC MSQ PRESCRIBED FRACTIONAL DOSE: 250 CGRAY
RAD ONC MSQ PRESCRIBED NUMBER OF FRACTIONS: 4
RAD ONC MSQ PRESCRIBED TECHNIQUE: NORMAL
RAD ONC MSQ PRESCRIBED TOTAL DOSE: 1000 CGRAY
RAD ONC MSQ PRESCRIPTION PATTERN COMMENT: NORMAL
RAD ONC MSQ START DATE: NORMAL
RAD ONC MSQ TREATMENT COURSE NUMBER: 1
RAD ONC MSQ TREATMENT SITE: NORMAL

## 2024-08-14 PROCEDURE — 77385 HC INTENSITY-MODULATED RADIATION THERAPY (IMRT), SIMPLE: CPT | Performed by: RADIOLOGY

## 2024-08-15 ENCOUNTER — DOCUMENTATION (OUTPATIENT)
Dept: RADIATION ONCOLOGY | Facility: CLINIC | Age: 39
End: 2024-08-15
Payer: COMMERCIAL

## 2024-08-15 ENCOUNTER — HOSPITAL ENCOUNTER (OUTPATIENT)
Dept: RADIATION ONCOLOGY | Facility: CLINIC | Age: 39
Setting detail: RADIATION/ONCOLOGY SERIES
Discharge: HOME | End: 2024-08-15
Payer: COMMERCIAL

## 2024-08-15 DIAGNOSIS — C77.3 SECONDARY AND UNSPECIFIED MALIGNANT NEOPLASM OF AXILLA AND UPPER LIMB LYMPH NODES (MULTI): ICD-10-CM

## 2024-08-15 DIAGNOSIS — Z17.0 MALIGNANT NEOPLASM OF UPPER-OUTER QUADRANT OF RIGHT BREAST IN FEMALE, ESTROGEN RECEPTOR POSITIVE (MULTI): ICD-10-CM

## 2024-08-15 DIAGNOSIS — C50.411 MALIGNANT NEOPLASM OF UPPER-OUTER QUADRANT OF RIGHT FEMALE BREAST (MULTI): ICD-10-CM

## 2024-08-15 DIAGNOSIS — Z51.0 ENCOUNTER FOR ANTINEOPLASTIC RADIATION THERAPY: ICD-10-CM

## 2024-08-15 DIAGNOSIS — C50.411 MALIGNANT NEOPLASM OF UPPER-OUTER QUADRANT OF RIGHT BREAST IN FEMALE, ESTROGEN RECEPTOR POSITIVE (MULTI): ICD-10-CM

## 2024-08-15 LAB
RAD ONC MSQ ACTUAL FRACTIONS DELIVERED: 4
RAD ONC MSQ ACTUAL SESSION DELIVERED DOSE: 250 CGRAY
RAD ONC MSQ ACTUAL TOTAL DOSE: 1000 CGRAY
RAD ONC MSQ ELAPSED DAYS: 3
RAD ONC MSQ LAST DATE: NORMAL
RAD ONC MSQ PRESCRIBED FRACTIONAL DOSE: 250 CGRAY
RAD ONC MSQ PRESCRIBED NUMBER OF FRACTIONS: 4
RAD ONC MSQ PRESCRIBED TECHNIQUE: NORMAL
RAD ONC MSQ PRESCRIBED TOTAL DOSE: 1000 CGRAY
RAD ONC MSQ PRESCRIPTION PATTERN COMMENT: NORMAL
RAD ONC MSQ START DATE: NORMAL
RAD ONC MSQ TREATMENT COURSE NUMBER: 1
RAD ONC MSQ TREATMENT SITE: NORMAL

## 2024-08-15 PROCEDURE — 77385 HC INTENSITY-MODULATED RADIATION THERAPY (IMRT), SIMPLE: CPT | Performed by: RADIOLOGY

## 2024-08-16 ENCOUNTER — APPOINTMENT (OUTPATIENT)
Dept: INTEGRATIVE MEDICINE | Facility: CLINIC | Age: 39
End: 2024-08-16
Payer: COMMERCIAL

## 2024-08-16 NOTE — PROGRESS NOTES
Radiation Oncology Treatment Summary    Patient Name:  Luz Rojo  MRN:  [id number]  :  1985    Referring Provider: No ref. provider found  Primary Care Provider: Richa Naranjo MD    Brief History: Luz Rojo is a 38 y.o. female with Malignant neoplasm of upper-outer quadrant of right breast in female, estrogen receptor positive (Multi), Clinical: Stage IB (cT1c, cN1, cM0, G2, ER+, ME+, HER2-)  Malignant neoplasm of upper-outer quadrant of right breast in female, estrogen receptor positive (Multi), Pathologic: Stage IB (pT1c, pN1(sn), cM0, G3, ER+, ME+, HER2-).  The patient completed radiotherapy as outlined below.    Radiation Treatment Summary:    3D CRT: Right Breast with lymph nodes    Treatment Period Technique Fraction Dose Fractions Total Dose   Course 1 7/15/2024-8/15/2024  (days elapsed: 31)         BREAST_RNI_R 7/15/2024-2024 VMAT 266 / 266 cGy  4256 / 4,256 cGy         TB_BOOST_R 2024-8/15/2024 VMAT 250 / 250 cGy  1000 / 1,000 cGy       Please see the patient's [facility] chart for further details regarding the radiation plan, including beam energy.    Concurrent Chemotherapy:  Treatment Plans       Name Type Plan Dates Plan Provider         Active    TC (DOCEtaxel / Cyclophosphamide), 21 Day [facility] Oncology Treatment  2024 - Present Ban Gallegos MD                    [facility] Toxicity Overview:   Toxicity Assessment          2024    08:00 2024    07:00 2024    07:00 2024    10:00 2024    07:00   Toxicity Assessment   Adverse Events Reviewed (WDL) Yes (Within Defined Limits) Yes (Within Defined Limits) Yes (Within Defined Limits) Yes (Within Defined Limits) Yes (Within Defined Limits)   Treatment Site Breast Breast Breast Breast Breast   Anorexia Grade 0 Grade 0 Grade 0 Grade 0 Grade 0   Dehydration Grade 0 Grade 0 Grade 0 Grade 0    Dermatitis Radiation Grade 0 Grade 0 Grade 2 Grade 2 Grade 2       Silvedine, utter cream,   Diarrhea      Grade 0   Fatigue Grade 0 Grade 0       more tired in the evenings Grade 1 Grade 1 Grade 1   Nausea Grade 0 Grade 0 Grade 0       intermittent and minor. self-resolving Grade 1       intermittent Grade 0   Pain Grade 0 Grade 0 Grade 1       mild pain when wearing a bra Grade 0 Grade 0   Breast Infection     Grade 0   Joint Range of Motion Decreased     Grade 0   Joint Range of Motion Decreased Lumbar Spine     Grade 0   Breast Pain Grade 0 Grade 0 Grade 1 Grade 0 Grade 0   Lymphedema     Grade 0       ROM good, seeing OT in September     Patient Disposition: Follow up  9/17/24 with Ana Richter MD, PhD  Attending Physician

## 2024-08-19 ENCOUNTER — APPOINTMENT (OUTPATIENT)
Dept: HEMATOLOGY/ONCOLOGY | Facility: CLINIC | Age: 39
End: 2024-08-19
Payer: COMMERCIAL

## 2024-08-30 ENCOUNTER — APPOINTMENT (OUTPATIENT)
Dept: HEMATOLOGY/ONCOLOGY | Facility: CLINIC | Age: 39
End: 2024-08-30
Payer: COMMERCIAL

## 2024-08-30 ENCOUNTER — PATIENT MESSAGE (OUTPATIENT)
Dept: RADIATION ONCOLOGY | Facility: CLINIC | Age: 39
End: 2024-08-30
Payer: COMMERCIAL

## 2024-08-30 DIAGNOSIS — Z17.0 MALIGNANT NEOPLASM OF UPPER-OUTER QUADRANT OF RIGHT BREAST IN FEMALE, ESTROGEN RECEPTOR POSITIVE (MULTI): Primary | ICD-10-CM

## 2024-08-30 DIAGNOSIS — C50.411 MALIGNANT NEOPLASM OF UPPER-OUTER QUADRANT OF RIGHT BREAST IN FEMALE, ESTROGEN RECEPTOR POSITIVE (MULTI): Primary | ICD-10-CM

## 2024-08-30 NOTE — PATIENT COMMUNICATION
RN received call and message from patient in regards to increased tenderness at surgical site. Patient's last radiation treatment was 2 weeks ago. Patient states that pain was worse yesterday and much improved today. Pain worsens when not wearing a bra and is better when wearing a bra. Re-assured patient that this discomfort is an expected side effect given timing of last treatment and that symptoms should continue to improve over the next 2 weeks. Encouraged patient to call if pain worsens over the next week. Dr. Richter updated as well.

## 2024-09-03 ENCOUNTER — LAB (OUTPATIENT)
Dept: LAB | Facility: CLINIC | Age: 39
End: 2024-09-03
Payer: COMMERCIAL

## 2024-09-03 DIAGNOSIS — Z17.0 MALIGNANT NEOPLASM OF UPPER-OUTER QUADRANT OF RIGHT BREAST IN FEMALE, ESTROGEN RECEPTOR POSITIVE (MULTI): ICD-10-CM

## 2024-09-03 DIAGNOSIS — C50.411 MALIGNANT NEOPLASM OF UPPER-OUTER QUADRANT OF RIGHT BREAST IN FEMALE, ESTROGEN RECEPTOR POSITIVE (MULTI): ICD-10-CM

## 2024-09-03 LAB
ALBUMIN SERPL BCP-MCNC: 4.3 G/DL (ref 3.4–5)
ALP SERPL-CCNC: 77 U/L (ref 33–110)
ALT SERPL W P-5'-P-CCNC: 44 U/L (ref 7–45)
ANION GAP SERPL CALC-SCNC: 12 MMOL/L (ref 10–20)
AST SERPL W P-5'-P-CCNC: 28 U/L (ref 9–39)
BASOPHILS # BLD AUTO: 0.03 X10*3/UL (ref 0–0.1)
BASOPHILS NFR BLD AUTO: 0.7 %
BILIRUB SERPL-MCNC: 0.4 MG/DL (ref 0–1.2)
BUN SERPL-MCNC: 14 MG/DL (ref 6–23)
CALCIUM SERPL-MCNC: 9.6 MG/DL (ref 8.6–10.3)
CHLORIDE SERPL-SCNC: 101 MMOL/L (ref 98–107)
CO2 SERPL-SCNC: 28 MMOL/L (ref 21–32)
CREAT SERPL-MCNC: 0.75 MG/DL (ref 0.5–1.05)
EGFRCR SERPLBLD CKD-EPI 2021: >90 ML/MIN/1.73M*2
EOSINOPHIL # BLD AUTO: 0.21 X10*3/UL (ref 0–0.7)
EOSINOPHIL NFR BLD AUTO: 4.8 %
ERYTHROCYTE [DISTWIDTH] IN BLOOD BY AUTOMATED COUNT: 13.3 % (ref 11.5–14.5)
GLUCOSE SERPL-MCNC: 89 MG/DL (ref 74–99)
HCT VFR BLD AUTO: 38.5 % (ref 36–46)
HGB BLD-MCNC: 12.7 G/DL (ref 12–16)
IMM GRANULOCYTES # BLD AUTO: 0 X10*3/UL (ref 0–0.7)
IMM GRANULOCYTES NFR BLD AUTO: 0 % (ref 0–0.9)
LYMPHOCYTES # BLD AUTO: 0.78 X10*3/UL (ref 1.2–4.8)
LYMPHOCYTES NFR BLD AUTO: 17.6 %
MCH RBC QN AUTO: 29.7 PG (ref 26–34)
MCHC RBC AUTO-ENTMCNC: 33 G/DL (ref 32–36)
MCV RBC AUTO: 90 FL (ref 80–100)
MONOCYTES # BLD AUTO: 0.49 X10*3/UL (ref 0.1–1)
MONOCYTES NFR BLD AUTO: 11.1 %
NEUTROPHILS # BLD AUTO: 2.91 X10*3/UL (ref 1.2–7.7)
NEUTROPHILS NFR BLD AUTO: 65.8 %
PLATELET # BLD AUTO: 254 X10*3/UL (ref 150–450)
POTASSIUM SERPL-SCNC: 3.6 MMOL/L (ref 3.5–5.3)
PROT SERPL-MCNC: 7.7 G/DL (ref 6.4–8.2)
RBC # BLD AUTO: 4.28 X10*6/UL (ref 4–5.2)
SODIUM SERPL-SCNC: 137 MMOL/L (ref 136–145)
WBC # BLD AUTO: 4.4 X10*3/UL (ref 4.4–11.3)

## 2024-09-03 PROCEDURE — 36415 COLL VENOUS BLD VENIPUNCTURE: CPT

## 2024-09-03 PROCEDURE — 85025 COMPLETE CBC W/AUTO DIFF WBC: CPT

## 2024-09-03 PROCEDURE — 80053 COMPREHEN METABOLIC PANEL: CPT

## 2024-09-04 NOTE — PROGRESS NOTES
"Patient ID: Alice Rojo is a 38 y.o. female.  Cancer Staging   Malignant neoplasm of upper-outer quadrant of right breast in female, estrogen receptor positive (Multi)  Staging form: Breast, AJCC 8th Edition  - Clinical: Stage IB (cT1c, cN1, cM0, G2, ER+, CT+, HER2-) - Signed by Liliana Barrett DO on 2/6/2024  - Pathologic: Stage IB (pT1c, pN1(sn), cM0, G3, ER+, CT+, HER2-) - Signed by Liliana Barrett DO on 4/9/2024  Oncology History   Malignant neoplasm of upper-outer quadrant of right breast in female, estrogen receptor positive (Multi)   2/6/2024 Initial Diagnosis    Malignant neoplasm of upper-outer quadrant of right breast in female, estrogen receptor positive (CMS/HCC)     2/6/2024 Cancer Staged    Staging form: Breast, AJCC 8th Edition, Clinical: Stage IB (cT1c, cN1, cM0, G2, ER+, CT+, HER2-) - Signed by Liliana Barrett DO on 2/6/2024 4/9/2024 Cancer Staged    Staging form: Breast, AJCC 8th Edition, Pathologic: Stage IB (pT1c, pN1(sn), cM0, G3, ER+, CT+, HER2-) - Signed by Liliana Barrett DO on 4/9/2024 4/22/2024 -  Chemotherapy    TC (DOCEtaxel / Cyclophosphamide), 21 Day Cycles       Subjective    HPI  Ms. Luz Rojo \"Alfonzo" is a 37 y/o female presenting for follow-up for breast cancer.     Patient states she is feeling pretty normal. She reports a recent cold that she was given a Zpak for per Dr. Coyle that resolved it, she has a cough every once in a while but no other symptoms. Her hair is coming back in. She reports some cramping 1-2 weeks ago with a small amount of discharge but no other period or symptoms. No other major complaints at this time.     Patient's past medical history, surgical history, family history and social history reviewed.    Objective    Vitals:    09/06/24 0930   BP: 149/87   Pulse: 102   Resp: 18   Temp: 36.7 °C (98.1 °F)   SpO2: 97%       Review of Systems:   Review of Systems:    Positive per HPI, otherwise negative.    Physical Exam  Gen: appears well in " clinic, NAD  HEENT: atraumatic head, normocephalic, EOMI, conjunctiva normal  LUNG: no increased WOB, CTAB  CV: No JVD. RRR  GI: soft, NT, ND  LE: no LE edema  Skin: no obvious rashes or lesions on visible skin  Neuro: interactive, no focal deficits noted  Psych: normal mood and affect  Performance Status:  Symptomatic; fully ambulatory    Labs/Imaging/Pathology: personally reviewed reports and images in Epic electronic medical record system. Pertinent results as it related to the plan represented in below in assessment and plan.   Assessment/Plan   1.Right breast IDC, stage IB ypT1c (20mm)pN1 (2/6LNs) M0, ER 60-70%, AL 80-90% , HER 2 IHC 1+  - initially diagnosed after abnormal mammogram first noted 1/24/2024 at [facility]  - Follow-up diagnostic imaging on 1/26/2024 reveals a spiculated mass at 10:30 10cmFN measuring 2cm on imaging.  There are also 2 abnormal axillary lymph nodes  - 1/30/2024 core bx of right breast mass path and LN revealed IDC grade 2 and axillary node bx same day revealed metastatic breast carciboma. ER 60-70%, AL 80-90% , HER 2 IHC 1+  - staging scans with CT c/a/p and bone scan 2/6/24 were negative.  - 3/22/24 right breast partial mastectomy path revealed 20 mm focus of invasive cancer, G3, along with Grade 3 DCIS. Margins negative. 2 out of 6 lymph nodes positive. Final staging pT1c pN1a.     - Today we discussed the role of adjuvant chemotherapy given higher clinical risk factors with size of tumor and node positive disease in setting of premenopausal state  - We did discuss there is a role for anthracycline-based therapy given her premenopausal status and node positive disease putting her at higher risk per the ABC trials (Rashawn, et al JCO 2017) we discussed with hormone positive being lower risk,  the benefit from anthracyclines compared to taxotere plus cytoxan is less clear and after reviewing side effects from both, patient is hesitant about the potential side effects from anthracycline  especially as she has hx of hyperemesis along with concern about secondary leukemia  - we discussed Taxotere plus cytoxan is a very reasonable alternative  - We also reviewed that much of the benefit of chemotherapy comes from the ovarian suppression affect and she is open to ovarian suppression long-term.  - 4/22/24 C1D1  Taxotere plus cytoxan included Emend pre-med in addition to dexamethasone 3 days post and the night before due to her concern for hyperemesis (as she had with pregnancy)  - cooling cap      5/13/24:  - overall tolerated C1 very well with minimal toxicity, reports minimal nausea  - does have some trouble with sleep and will decrease dex to daily after treatment to see if still manageable  - changes in dose or premeds today  - No contraindications to proceed with cycle 2  - Will continue Ativan for anxiety as premed  - Patient is tolerating treatment   - We discussed that there will be no dose adjustment at this time   - Patient would like to cancel day 8 visits and call the office PRN.   - Patient has no other major complaints at this time   - RTC in 3 weeks for cycle 3        6/3/24:   - Patient overall tolerated cycle 2 very well, her main complaint was constipation.   - Discuss with her and she can up titrate Miralax.  - Will also send a prescription for Lactulose to use if severe constipation.  - No neuropathy, no other GI complaints.  - No contraindications to proceed with cycle 3 today.  - Labs reviewed, slight elevation in ALT is likely from the chemotherapy and we will monitor.  - RTC in three weeks for cycle 4     6/24/24:   - Tolerated last cycle fairly well without any increased toxicity.  - She did have some days of fatigue  - no dose adjustments today  - No contraindications to proceed with cycle 4  - We did discuss that at this point she will proceed to radiation simulation tomorrow  - I will send a prescription for Arimidex that she can start 2 weeks after finishing radiation.  - She  will start Lupron in two months when I see her and we will plan for ovarian suppression.   - She did not need any refills today.   - RTC with me in 2 months     9/6/24:  - Patient tolerated RT well with some minor burn but the skin is healing now.   - She will receive Lupron today.  - She will start anastrozole which she already has at home.  - We will plan for Zometa in 2 months.  - We again reviewed the benefits of endocrine therapy.  - RTC with me in 2 months, she will see Dr. Barrett in January, and can see Ashok in May.     Reviewed ongoing medical problems and how they relate to her breast cancer, will continue long term monitoring.     RTC in 2 months. This note has been transcribed using a medical scribe and there is a possibility of unintentional typing misprints.        Diagnoses and all orders for this visit:  Malignant neoplasm of upper-outer quadrant of right breast in female, estrogen receptor positive (Multi)  -     Clinic Appointment Request  -     Referral to [facility]; Future  -     Clinic Appointment Request; Future  -     CBC and Auto Differential; Future  -     Comprehensive metabolic panel; Future  Other orders  -     sodium chloride 0.9 % bolus 500 mL  -     zoledronic acid (Zometa) 4 mg in dextrose 5% 100 mL IV  -     sodium chloride 0.9 % bolus 500 mL  -     dextrose 5 % in water (D5W) bolus  -     diphenhydrAMINE (BENADryl) injection 50 mg  -     methylPREDNISolone sod succinate (SOLU-Medrol) 40 mg/mL injection 40 mg  -     famotidine PF (Pepcid) injection 20 mg  -     EPINEPHrine (Epipen) injection syringe 0.3 mg  -     albuterol 2.5 mg /3 mL (0.083 %) nebulizer solution 3 mL         Ban Gallegos MD  Hematology/Oncology  [facility]    Scribe Attestation  By signing my name below, I, Marie Pleitez   attest that this documentation has been prepared under the direction and in the presence of Ban Gallegos MD.     Time Spent  Prep time on day of  patient encounter: 5 minutes  Time spent directly with patient, family or caregiver: 25 minutes  Additional Time Spent on Patient Care Activities: 5 minutes  Documentation Time: 5 minutes  Other Time Spent: 0 minutes  Total: 40 minutes

## 2024-09-06 ENCOUNTER — TELEPHONE (OUTPATIENT)
Dept: HEMATOLOGY/ONCOLOGY | Facility: CLINIC | Age: 39
End: 2024-09-06

## 2024-09-06 ENCOUNTER — INFUSION (OUTPATIENT)
Dept: HEMATOLOGY/ONCOLOGY | Facility: CLINIC | Age: 39
End: 2024-09-06
Payer: COMMERCIAL

## 2024-09-06 ENCOUNTER — OFFICE VISIT (OUTPATIENT)
Dept: HEMATOLOGY/ONCOLOGY | Facility: CLINIC | Age: 39
End: 2024-09-06
Payer: COMMERCIAL

## 2024-09-06 VITALS
SYSTOLIC BLOOD PRESSURE: 149 MMHG | RESPIRATION RATE: 18 BRPM | WEIGHT: 222.44 LBS | OXYGEN SATURATION: 97 % | DIASTOLIC BLOOD PRESSURE: 87 MMHG | BODY MASS INDEX: 35.9 KG/M2 | TEMPERATURE: 98.1 F | HEART RATE: 102 BPM

## 2024-09-06 DIAGNOSIS — Z17.0 MALIGNANT NEOPLASM OF UPPER-OUTER QUADRANT OF RIGHT BREAST IN FEMALE, ESTROGEN RECEPTOR POSITIVE (MULTI): Primary | ICD-10-CM

## 2024-09-06 DIAGNOSIS — C50.411 MALIGNANT NEOPLASM OF UPPER-OUTER QUADRANT OF RIGHT BREAST IN FEMALE, ESTROGEN RECEPTOR POSITIVE (MULTI): ICD-10-CM

## 2024-09-06 DIAGNOSIS — C50.411 MALIGNANT NEOPLASM OF UPPER-OUTER QUADRANT OF RIGHT BREAST IN FEMALE, ESTROGEN RECEPTOR POSITIVE (MULTI): Primary | ICD-10-CM

## 2024-09-06 DIAGNOSIS — Z17.0 MALIGNANT NEOPLASM OF UPPER-OUTER QUADRANT OF RIGHT BREAST IN FEMALE, ESTROGEN RECEPTOR POSITIVE (MULTI): ICD-10-CM

## 2024-09-06 PROCEDURE — 2500000004 HC RX 250 GENERAL PHARMACY W/ HCPCS (ALT 636 FOR OP/ED): Mod: JZ | Performed by: INTERNAL MEDICINE

## 2024-09-06 PROCEDURE — G2211 COMPLEX E/M VISIT ADD ON: HCPCS | Performed by: INTERNAL MEDICINE

## 2024-09-06 PROCEDURE — 99215 OFFICE O/P EST HI 40 MIN: CPT | Performed by: INTERNAL MEDICINE

## 2024-09-06 PROCEDURE — 96402 CHEMO HORMON ANTINEOPL SQ/IM: CPT

## 2024-09-06 RX ORDER — FAMOTIDINE 10 MG/ML
20 INJECTION INTRAVENOUS ONCE AS NEEDED
OUTPATIENT
Start: 2024-09-19

## 2024-09-06 RX ORDER — EPINEPHRINE 0.3 MG/.3ML
0.3 INJECTION SUBCUTANEOUS EVERY 5 MIN PRN
OUTPATIENT
Start: 2024-09-19

## 2024-09-06 RX ORDER — ALBUTEROL SULFATE 0.83 MG/ML
3 SOLUTION RESPIRATORY (INHALATION) AS NEEDED
OUTPATIENT
Start: 2024-11-05

## 2024-09-06 RX ORDER — FAMOTIDINE 10 MG/ML
20 INJECTION INTRAVENOUS ONCE AS NEEDED
Status: DISCONTINUED | OUTPATIENT
Start: 2024-09-06 | End: 2024-09-06 | Stop reason: HOSPADM

## 2024-09-06 RX ORDER — FAMOTIDINE 10 MG/ML
20 INJECTION INTRAVENOUS ONCE AS NEEDED
OUTPATIENT
Start: 2024-11-05

## 2024-09-06 RX ORDER — DIPHENHYDRAMINE HYDROCHLORIDE 50 MG/ML
50 INJECTION INTRAMUSCULAR; INTRAVENOUS AS NEEDED
OUTPATIENT
Start: 2024-09-19

## 2024-09-06 RX ORDER — DIPHENHYDRAMINE HYDROCHLORIDE 50 MG/ML
50 INJECTION INTRAMUSCULAR; INTRAVENOUS AS NEEDED
OUTPATIENT
Start: 2024-11-05

## 2024-09-06 RX ORDER — ALBUTEROL SULFATE 0.83 MG/ML
3 SOLUTION RESPIRATORY (INHALATION) AS NEEDED
OUTPATIENT
Start: 2024-09-19

## 2024-09-06 RX ORDER — EPINEPHRINE 0.3 MG/.3ML
0.3 INJECTION SUBCUTANEOUS EVERY 5 MIN PRN
Status: DISCONTINUED | OUTPATIENT
Start: 2024-09-06 | End: 2024-09-06 | Stop reason: HOSPADM

## 2024-09-06 RX ORDER — EPINEPHRINE 0.3 MG/.3ML
0.3 INJECTION SUBCUTANEOUS EVERY 5 MIN PRN
OUTPATIENT
Start: 2024-11-05

## 2024-09-06 RX ORDER — ALBUTEROL SULFATE 0.83 MG/ML
3 SOLUTION RESPIRATORY (INHALATION) AS NEEDED
Status: DISCONTINUED | OUTPATIENT
Start: 2024-09-06 | End: 2024-09-06 | Stop reason: HOSPADM

## 2024-09-06 RX ORDER — DIPHENHYDRAMINE HYDROCHLORIDE 50 MG/ML
50 INJECTION INTRAMUSCULAR; INTRAVENOUS AS NEEDED
Status: DISCONTINUED | OUTPATIENT
Start: 2024-09-06 | End: 2024-09-06 | Stop reason: HOSPADM

## 2024-09-06 ASSESSMENT — PAIN SCALES - GENERAL: PAINLEVEL: 0-NO PAIN

## 2024-09-06 NOTE — TELEPHONE ENCOUNTER
Left detailed message for the patient that patient does have scheduling order pending for treatment in October and scheduling should be calling next week to complete. Encouraged patient to call office back with any further questions or concerns,

## 2024-09-06 NOTE — PATIENT INSTRUCTIONS
Please have labs drawn on 11/1 or 11/4 in preparation for your Zometa infusion on 11/5/24. Any questions call us! :)

## 2024-09-11 ENCOUNTER — APPOINTMENT (OUTPATIENT)
Dept: OCCUPATIONAL THERAPY | Facility: CLINIC | Age: 39
End: 2024-09-11
Payer: COMMERCIAL

## 2024-09-11 ENCOUNTER — APPOINTMENT (OUTPATIENT)
Dept: PRIMARY CARE | Facility: CLINIC | Age: 39
End: 2024-09-11
Payer: COMMERCIAL

## 2024-09-11 ENCOUNTER — TELEPHONE (OUTPATIENT)
Dept: PRIMARY CARE | Facility: CLINIC | Age: 39
End: 2024-09-11

## 2024-09-11 VITALS — WEIGHT: 222 LBS | HEIGHT: 66 IN | BODY MASS INDEX: 35.68 KG/M2

## 2024-09-11 DIAGNOSIS — C50.411 MALIGNANT NEOPLASM OF UPPER-OUTER QUADRANT OF RIGHT BREAST IN FEMALE, ESTROGEN RECEPTOR POSITIVE: ICD-10-CM

## 2024-09-11 DIAGNOSIS — Z17.0 MALIGNANT NEOPLASM OF UPPER-OUTER QUADRANT OF RIGHT BREAST IN FEMALE, ESTROGEN RECEPTOR POSITIVE: ICD-10-CM

## 2024-09-11 DIAGNOSIS — E03.8 OTHER SPECIFIED HYPOTHYROIDISM: ICD-10-CM

## 2024-09-11 DIAGNOSIS — E66.9 CLASS 2 OBESITY: Primary | ICD-10-CM

## 2024-09-11 DIAGNOSIS — E55.9 VITAMIN D DEFICIENCY: ICD-10-CM

## 2024-09-11 DIAGNOSIS — Z17.0 MALIGNANT NEOPLASM OF UPPER-OUTER QUADRANT OF RIGHT BREAST IN FEMALE, ESTROGEN RECEPTOR POSITIVE (MULTI): ICD-10-CM

## 2024-09-11 DIAGNOSIS — F32.0 DEPRESSION, MAJOR, SINGLE EPISODE, MILD (CMS-HCC): ICD-10-CM

## 2024-09-11 DIAGNOSIS — C50.411 MALIGNANT NEOPLASM OF UPPER-OUTER QUADRANT OF RIGHT BREAST IN FEMALE, ESTROGEN RECEPTOR POSITIVE (MULTI): ICD-10-CM

## 2024-09-11 PROCEDURE — 99214 OFFICE O/P EST MOD 30 MIN: CPT | Performed by: INTERNAL MEDICINE

## 2024-09-11 PROCEDURE — 3008F BODY MASS INDEX DOCD: CPT | Performed by: INTERNAL MEDICINE

## 2024-09-11 PROCEDURE — 1036F TOBACCO NON-USER: CPT | Performed by: INTERNAL MEDICINE

## 2024-09-11 RX ORDER — ANASTROZOLE 1 MG/1
TABLET ORAL
COMMUNITY
Start: 2024-07-31

## 2024-09-11 RX ORDER — SEMAGLUTIDE 0.25 MG/.5ML
0.25 INJECTION, SOLUTION SUBCUTANEOUS WEEKLY
Qty: 2 ML | Refills: 3 | Status: SHIPPED | OUTPATIENT
Start: 2024-09-11 | End: 2024-12-26

## 2024-09-11 NOTE — PATIENT INSTRUCTIONS
Discussed with pt SGL1 tx for obesity , med side effects , complication as well as risks, also the fact that might no9t be covered  by insurance

## 2024-09-11 NOTE — PROGRESS NOTES
"Subjective   Luz Rojo \"Alice\" is a 38 y.o. female who presents for Follow-up (Weight loss medication - video johnny.).  Video appointment - weight loss medication - patient  wants to start taking TX to loose weight , tried   diet, exercising , not  losing weight   Labs - 1.2024  Had breast cancer seen by  surgeon, surgery procedure done on  3.22.2024 - R side  , dr Barrett   Had chemotherapy, radiation and just started hormone therapy, seeing oncologist dr Gallegos -at  .  Labs and test reviewed with patient , all question answered, further evaluation, if needed , discussed.          Review of Systems    Objective   Physical Exam  Ht 1.676 m (5' 6\")   Wt 101 kg (222 lb)   LMP 04/20/2024 Comment: no periods since than due to chemothearphy /per  patient statement  BMI 35.83 kg/m²     Lab Results   Component Value Date    GLUCOSE 89 09/03/2024    CALCIUM 9.6 09/03/2024     09/03/2024    K 3.6 09/03/2024    CO2 28 09/03/2024     09/03/2024    BUN 14 09/03/2024    CREATININE 0.75 09/03/2024     Lab Results   Component Value Date    ALT 44 09/03/2024    AST 28 09/03/2024    ALKPHOS 77 09/03/2024    BILITOT 0.4 09/03/2024     Lab Results   Component Value Date    WBC 4.4 09/03/2024    HGB 12.7 09/03/2024    HCT 38.5 09/03/2024    MCV 90 09/03/2024     09/03/2024     Lab Results   Component Value Date    TSH 1.66 05/08/2024         Assessment/Plan   Problem List Items Addressed This Visit       Depression, major, single episode, mild (CMS-HCC)    Hypothyroidism    Relevant Orders    TSH with reflex to Free T4 if abnormal    Vitamin D deficiency    Relevant Orders    Vitamin D 25-Hydroxy,Total (for eval of Vitamin D levels)    Class 2 obesity - Primary    Relevant Medications    semaglutide, weight loss, (Wegovy) 0.25 mg/0.5 mL pen injector    Other Relevant Orders    Insulin, Fasting    Hemoglobin A1C    Vitamin B12    Hepatic Function Panel    TSH with reflex to Free T4 if abnormal    Malignant " neoplasm of upper-outer quadrant of right breast in female, estrogen receptor positive (Multi)    Relevant Medications    semaglutide, weight loss, (Wegovy) 0.25 mg/0.5 mL pen injector

## 2024-09-13 NOTE — TELEPHONE ENCOUNTER
Patient calling with questions regarding the prior authorization for wegovy- requesting call back from clinical staff.

## 2024-09-14 DIAGNOSIS — E03.9 HYPOTHYROIDISM, UNSPECIFIED: ICD-10-CM

## 2024-09-16 ENCOUNTER — DOCUMENTATION (OUTPATIENT)
Dept: PHARMACY | Facility: HOSPITAL | Age: 39
End: 2024-09-16
Payer: COMMERCIAL

## 2024-09-16 RX ORDER — LEVOTHYROXINE SODIUM 88 UG/1
TABLET ORAL
Qty: 90 TABLET | Refills: 1 | Status: SHIPPED | OUTPATIENT
Start: 2024-09-16

## 2024-09-16 NOTE — PROGRESS NOTES
Prior Authorization - [employer]  A prior authorization request for Luz VAUGHN Rojo was submitted for [employer] on 9/16/24.  The authorization request was approved by the patient's insurance on 09/16/24.    Approval Duration: 04/14/2025    The patient has been notified of this outcome. Left voicemail.  Follow-up with pharmacy as scheduled or sooner as needed.  Please contact clinical pharmacy with any further questions. Thank you.    Luz Woods, PharmD  [phone]  09/16/24

## 2024-09-17 ENCOUNTER — HOSPITAL ENCOUNTER (OUTPATIENT)
Dept: RADIATION ONCOLOGY | Facility: CLINIC | Age: 39
Setting detail: RADIATION/ONCOLOGY SERIES
Discharge: HOME | End: 2024-09-17
Payer: COMMERCIAL

## 2024-09-17 VITALS
TEMPERATURE: 97.7 F | HEART RATE: 105 BPM | DIASTOLIC BLOOD PRESSURE: 98 MMHG | WEIGHT: 222.44 LBS | RESPIRATION RATE: 18 BRPM | BODY MASS INDEX: 35.9 KG/M2 | SYSTOLIC BLOOD PRESSURE: 146 MMHG | OXYGEN SATURATION: 96 %

## 2024-09-17 DIAGNOSIS — C50.411 MALIGNANT NEOPLASM OF UPPER-OUTER QUADRANT OF RIGHT BREAST IN FEMALE, ESTROGEN RECEPTOR POSITIVE: Primary | ICD-10-CM

## 2024-09-17 DIAGNOSIS — R59.0 AXILLARY LYMPHADENOPATHY: ICD-10-CM

## 2024-09-17 DIAGNOSIS — Z17.0 MALIGNANT NEOPLASM OF UPPER-OUTER QUADRANT OF RIGHT BREAST IN FEMALE, ESTROGEN RECEPTOR POSITIVE: Primary | ICD-10-CM

## 2024-09-17 PROCEDURE — 99214 OFFICE O/P EST MOD 30 MIN: CPT | Performed by: NURSE PRACTITIONER

## 2024-09-17 RX ORDER — SEMAGLUTIDE 0.25 MG/.5ML
0.25 INJECTION, SOLUTION SUBCUTANEOUS WEEKLY
Refills: 3 | OUTPATIENT
Start: 2024-09-17 | End: 2025-01-01

## 2024-09-17 ASSESSMENT — PAIN SCALES - GENERAL: PAINLEVEL: 0-NO PAIN

## 2024-09-17 ASSESSMENT — PATIENT HEALTH QUESTIONNAIRE - PHQ9
2. FEELING DOWN, DEPRESSED OR HOPELESS: NOT AT ALL
SUM OF ALL RESPONSES TO PHQ9 QUESTIONS 1 AND 2: 0
1. LITTLE INTEREST OR PLEASURE IN DOING THINGS: NOT AT ALL

## 2024-09-17 ASSESSMENT — ENCOUNTER SYMPTOMS
LOSS OF SENSATION IN FEET: 0
OCCASIONAL FEELINGS OF UNSTEADINESS: 0
DEPRESSION: 0

## 2024-09-17 ASSESSMENT — COLUMBIA-SUICIDE SEVERITY RATING SCALE - C-SSRS
2. HAVE YOU ACTUALLY HAD ANY THOUGHTS OF KILLING YOURSELF?: NO
1. IN THE PAST MONTH, HAVE YOU WISHED YOU WERE DEAD OR WISHED YOU COULD GO TO SLEEP AND NOT WAKE UP?: NO
6. HAVE YOU EVER DONE ANYTHING, STARTED TO DO ANYTHING, OR PREPARED TO DO ANYTHING TO END YOUR LIFE?: NO

## 2024-09-17 NOTE — PROGRESS NOTES
Radiation Oncology Follow-Up    Patient Name:  Luz Rojo  MRN:  [id number]  :  1985    Referring Provider: Liliana Barrett DO  Primary Care Provider: Richa Naranjo MD  Care Team: Patient Care Team:  Richa Naranjo MD as PCP - General  Richa Naranjo MD as PCP - [facility] PCP  MD Ban Gonzalez MD as Consulting Physician (Hematology and Oncology)  MEMO Gee as  (Oncology)    Date of Service: 2024   38 y.o. female with Malignant neoplasm of upper-outer quadrant of right breast in female, estrogen receptor positive (Multi), Clinical: Stage IB (cT1c, cN1, cM0, G2, ER+, MA+, HER2-)  Malignant neoplasm of upper-outer quadrant of right breast in female, estrogen receptor positive (Multi), Pathologic: Stage IB (pT1c, pN1(sn), cM0, G3, ER+, MA+, HER2-).  The patient completed radiotherapy as outlined below.     Oncologic History:     2024: First ever screening mammogram showed an abnormality of the right breast prompting further imaging.     2024 diagnostic imaging showed a spiculated mass at the 10:30 position 10 cm from the nipple measuring 2 cm on imaging.  There were 2 abnormal axillary lymph nodes noted.  Biopsy was recommended.     2024: Ultrasound-guided core needle biopsy showed invasive ductal carcinoma, grade 2, ER 60 to 70% positive, MA 80-90% positive, HER2 1+ nonamplified.  Biopsy of the axillary lymph node revealed metastatic breast carcinoma.     2024: Staging scans with CT chest abdomen pelvis and bone scan showed no evidence of distant metastatic disease.     She was seen by Dr. Liliana Barrett and surgical oncology to discuss treatment options.  Patient elected to proceed with lumpectomy and sentinel lymph node biopsy.     2024: Right lumpectomy and sentinel lymph node biopsy showed 2.0 cm invasive ductal carcinoma, grade 3 with associated high-grade DCIS measuring 2.8 cm  "with comedonecrosis, negative surgical margins, though the posterior margin on the DCIS was less than 2 mm, 2 out of 6 lymph nodes involved with microscopic extranodal extension, ER 60-70% positive, [place] 80-90% positive, HER2-, final stage pT1c N1a anatomic stage IIa prognostic stage Ib     April 10, 2024: Patient seen by Dr. Orlando in medical oncology.  Discussed oral chemotherapy for premenopausal node positive patient and discussed Adriamycin based chemo versus TC alone.  Given concerns for side effects including emesis with Adriamycin, patient elected to proceed with TC based chemotherapy.     April 18, 2024: Patient's case discussed at multidisciplinary tumor board.  Recommendation was for referral to medical and radiation oncology.  Surgery was complete but plan to consider adjuvant chemotherapy.  Also consider  CTG 1119 (MA.39) for which she might be eligible if her Oncotype score were low.     April 23, 2024: Patient seen in integrative medicine by Dr. Elliott with plans to check vitamin levels acupuncture active.      4/22/24 C1D1  Taxotere plus cytoxan included - cooling cap. Completed 4 cycles TC 6/24/24    Plan for Zometa infusions q 6 mo.     Radiation Treatment Summary:            3D CRT: Right Breast with lymph nodes     Treatment Period Technique Fraction Dose Fractions Total Dose   Course 1 7/15/2024-8/15/2024  (days elapsed: 31)         BREAST_RNI_R 7/15/2024-8/9/2024 VMAT 266 / 266 cGy 16 / 16 4256 / 4,256 cGy         TB_BOOST_R 8/12/2024-8/15/2024 VMAT 250 / 250 cGy 4 / 4 1000 / 1,000 cGy     Adjuvant endocrine therapy with anastrozole. Monthly Lupron injections for ovarian suppression.     SUBJECTIVE  History of Present Illness:   Luz VAUGHN Rojo \"Alice\" is here today for routine radiation follow up/initial radiation survivorship visit.  She is doing well overall.  Right breast has healed well and skin intact. No swelling of right arm.  She has some mild decreased ROM right arm and has " experienced some right axillary cording. She is now on ovarian suppression and taking anastrozole. No adverse effects thus far. Denies headaches, fever, chills, cough, SOB, chest pain, n/v/c/d or bony pain. Plan is to start Zometa infusions.     Review of Systems:    Review of Systems   All other systems reviewed and are negative.    Performance Status:   The Karnofsky performance scale today is 100, Fully active, able to carry on all pre-disease performed without restriction (ECOG equivalent 0).      OBJECTIVE  Vital Signs:  LMP 04/20/2024 Comment: no periods since than due to chemothearphy /per  patient statement     Current Outpatient Medications:     anastrozole (Arimidex) 1 mg tablet, TAKE 1 TABLET (1 MG TOTAL) BY MOUTH ONCE DAILY. SWALLOW WHOLE WITH A DRINK OF WATER., Disp: , Rfl:     bacitracin zinc-polymyxin B 500-10,000 unit/gram ointment, Apply to affected area daily, Disp: 28 g, Rfl: 0    calcium carbonate (CALCIUM 500 ORAL), Take by mouth. 2tabs or 3 tabs, Disp: , Rfl:     cholecalciferol (Vitamin D3) 5,000 Units tablet, Take 1 tablet (5,000 Units) by mouth once daily., Disp: , Rfl:     cyanocobalamin, vitamin B-12, (VITAMIN B-12 ORAL), Take 1 capsule by mouth once daily., Disp: , Rfl:     levothyroxine (Synthroid, Levoxyl) 88 mcg tablet, TAKE 1 TABLET BY MOUTH EVERY DAY, Disp: 90 tablet, Rfl: 1    omega-3 acid ethyl esters (Lovaza) 1 gram capsule, TAKE 1 CAPSULE (1 G) BY MOUTH ONCE DAILY., Disp: 90 capsule, Rfl: 1    semaglutide, weight loss, (Wegovy) 0.25 mg/0.5 mL pen injector, Inject 0.25 mg under the skin 1 (one) time per week for 16 doses., Disp: 2 mL, Rfl: 3    sertraline (Zoloft) 100 mg tablet, Take 1 tablet (100 mg) by mouth once daily., Disp: 90 tablet, Rfl: 3     Physical Exam  Vitals reviewed.   Constitutional:       Appearance: Normal appearance.   HENT:      Head: Normocephalic and atraumatic.      Nose: Nose normal.      Mouth/Throat:      Mouth: Mucous membranes are moist.      Pharynx:  Oropharynx is clear.   Eyes:      Conjunctiva/sclera: Conjunctivae normal.      Pupils: Pupils are equal, round, and reactive to light.   Cardiovascular:      Rate and Rhythm: Normal rate and regular rhythm.      Heart sounds: Normal heart sounds.   Pulmonary:      Effort: Pulmonary effort is normal.      Breath sounds: Normal breath sounds.   Chest:   Breasts:     Right: No swelling, inverted nipple, mass or nipple discharge.      Left: No swelling, inverted nipple, mass, nipple discharge or skin change.          Comments: Right breast with well healed surgical incisions. Skin intact. Tanning in radiation field.   Abdominal:      Palpations: Abdomen is soft.   Musculoskeletal:         General: No swelling. Normal range of motion.      Cervical back: Normal range of motion and neck supple.   Lymphadenopathy:      Cervical: No cervical adenopathy.      Upper Body:      Right upper body: No supraclavicular or axillary adenopathy.      Left upper body: No supraclavicular or axillary adenopathy.   Skin:     General: Skin is warm and dry.   Neurological:      General: No focal deficit present.      Mental Status: She is alert and oriented to person, place, and time.   Psychiatric:         Mood and Affect: Mood normal.         Behavior: Behavior normal.         ASSESSMENT:  38 y.o. female with stage IB right breast cancer s/p breast conserving surgery followed by adjuvant chemotherapy and radiation.  Reviewed skin care, possible late effects of treatment, anastrozole side effects and follow up plan. Will refer her to PT for right arm mobility and cording.    PLAN:    - Mammogram and FUV with Dr. Barrett  - Continue ovarian suppression, anastrozole and follow up with Dr. Gallegos  - PT referral  - Radiation follow up in 6 mo.  Call us with any questions or concerns.   Ana Alberto CNP  [phone]

## 2024-09-18 ENCOUNTER — APPOINTMENT (OUTPATIENT)
Dept: PRIMARY CARE | Facility: CLINIC | Age: 39
End: 2024-09-18
Payer: COMMERCIAL

## 2024-09-20 ENCOUNTER — APPOINTMENT (OUTPATIENT)
Dept: HEMATOLOGY/ONCOLOGY | Facility: CLINIC | Age: 39
End: 2024-09-20
Payer: COMMERCIAL

## 2024-09-20 RX ORDER — SEMAGLUTIDE 0.25 MG/.5ML
0.25 INJECTION, SOLUTION SUBCUTANEOUS WEEKLY
Qty: 2 ML | Refills: 3 | Status: SHIPPED | OUTPATIENT
Start: 2024-09-20 | End: 2025-01-04

## 2024-10-01 ENCOUNTER — TELEPHONE (OUTPATIENT)
Dept: PHYSICAL THERAPY | Facility: HOSPITAL | Age: 39
End: 2024-10-01
Payer: COMMERCIAL

## 2024-10-01 NOTE — TELEPHONE ENCOUNTER
[facility] APPT CANC NOTICE FOR IE SCHED 10/03/24. PC TO PT TO SEE IF SHE NEEDS TO RESCHED; LMOM IN REFERENCE

## 2024-10-02 ENCOUNTER — APPOINTMENT (OUTPATIENT)
Dept: OCCUPATIONAL THERAPY | Facility: CLINIC | Age: 39
End: 2024-10-02
Payer: COMMERCIAL

## 2024-10-02 ENCOUNTER — INFUSION (OUTPATIENT)
Dept: HEMATOLOGY/ONCOLOGY | Facility: CLINIC | Age: 39
End: 2024-10-02
Payer: COMMERCIAL

## 2024-10-02 ENCOUNTER — APPOINTMENT (OUTPATIENT)
Dept: PHARMACY | Facility: HOSPITAL | Age: 39
End: 2024-10-02
Payer: COMMERCIAL

## 2024-10-02 VITALS
BODY MASS INDEX: 35.73 KG/M2 | SYSTOLIC BLOOD PRESSURE: 152 MMHG | WEIGHT: 221.34 LBS | DIASTOLIC BLOOD PRESSURE: 100 MMHG | RESPIRATION RATE: 18 BRPM | HEART RATE: 90 BPM | TEMPERATURE: 97.5 F | OXYGEN SATURATION: 97 %

## 2024-10-02 DIAGNOSIS — Z17.0 MALIGNANT NEOPLASM OF UPPER-OUTER QUADRANT OF RIGHT BREAST IN FEMALE, ESTROGEN RECEPTOR POSITIVE: ICD-10-CM

## 2024-10-02 DIAGNOSIS — C50.411 MALIGNANT NEOPLASM OF UPPER-OUTER QUADRANT OF RIGHT BREAST IN FEMALE, ESTROGEN RECEPTOR POSITIVE: ICD-10-CM

## 2024-10-02 PROCEDURE — 2500000004 HC RX 250 GENERAL PHARMACY W/ HCPCS (ALT 636 FOR OP/ED): Mod: JZ | Performed by: INTERNAL MEDICINE

## 2024-10-02 PROCEDURE — 96401 CHEMO ANTI-NEOPL SQ/IM: CPT

## 2024-10-02 RX ORDER — FAMOTIDINE 10 MG/ML
20 INJECTION INTRAVENOUS ONCE AS NEEDED
OUTPATIENT
Start: 2024-10-04

## 2024-10-02 RX ORDER — EPINEPHRINE 0.3 MG/.3ML
0.3 INJECTION SUBCUTANEOUS EVERY 5 MIN PRN
OUTPATIENT
Start: 2024-10-04

## 2024-10-02 RX ORDER — DIPHENHYDRAMINE HYDROCHLORIDE 50 MG/ML
50 INJECTION INTRAMUSCULAR; INTRAVENOUS AS NEEDED
OUTPATIENT
Start: 2024-10-04

## 2024-10-02 RX ORDER — ALBUTEROL SULFATE 0.83 MG/ML
3 SOLUTION RESPIRATORY (INHALATION) AS NEEDED
OUTPATIENT
Start: 2024-10-04

## 2024-10-02 ASSESSMENT — PAIN SCALES - GENERAL: PAINLEVEL: 3

## 2024-10-03 ENCOUNTER — APPOINTMENT (OUTPATIENT)
Dept: OCCUPATIONAL THERAPY | Facility: HOSPITAL | Age: 39
End: 2024-10-03
Payer: COMMERCIAL

## 2024-10-04 ENCOUNTER — PATIENT MESSAGE (OUTPATIENT)
Dept: SURGICAL ONCOLOGY | Facility: HOSPITAL | Age: 39
End: 2024-10-04
Payer: COMMERCIAL

## 2024-10-04 ENCOUNTER — APPOINTMENT (OUTPATIENT)
Dept: HEMATOLOGY/ONCOLOGY | Facility: CLINIC | Age: 39
End: 2024-10-04
Payer: COMMERCIAL

## 2024-10-04 LAB
AP SUMMARY REPORT: NORMAL
SCAN RESULT: NORMAL

## 2024-10-22 DIAGNOSIS — C50.411 MALIGNANT NEOPLASM OF UPPER-OUTER QUADRANT OF RIGHT BREAST IN FEMALE, ESTROGEN RECEPTOR POSITIVE: ICD-10-CM

## 2024-10-22 DIAGNOSIS — Z17.0 MALIGNANT NEOPLASM OF UPPER-OUTER QUADRANT OF RIGHT BREAST IN FEMALE, ESTROGEN RECEPTOR POSITIVE: ICD-10-CM

## 2024-10-22 DIAGNOSIS — E66.812 CLASS 2 OBESITY: ICD-10-CM

## 2024-10-22 RX ORDER — SEMAGLUTIDE 0.5 MG/.5ML
0.5 INJECTION, SOLUTION SUBCUTANEOUS WEEKLY
Qty: 2 ML | Refills: 1 | Status: SHIPPED | OUTPATIENT
Start: 2024-10-22 | End: 2024-12-11

## 2024-10-22 NOTE — PROGRESS NOTES
"  Clinical Pharmacy Appointment    Patient ID: Luz Rojo \"Alfonzo" is a 39 y.o. female who presents for obesity.    Pt is here for First appointment.   Referring Provider: Richa Naranjo MD  PCP: Richa Naranjo MD, last visit: 9/11/24, next visit: 1/15/25    Subjective   HPI  PMH significant for obesity, anxiety, depression, HLD, hypothyroidism.  Special needs/barriers to therapy: None identified    Medication System Management  Patients preferred pharmacy: [facility] home delivery  Adherence/Organization: No current concerns  Affordability/Accessibility: No current concerns    Drug Interactions  No relevant drug interactions were noted.    WEIGHT LOSS:   BMI Readings from Last 1 Encounters:   10/02/24 35.73 kg/m²   Starting weight: 222 lbs. (9/11/24)  Current weight: 218 lbs.    Lab Results   Component Value Date    HGBA1C 5.2 01/05/2024    GLUCOSE 89 09/03/2024   Hyperglycemia: Denies signs and symptoms    Lifestyle  Diet: 3 meals/day.   BK: coffee, peanut butter toast  LN: turkey sandwich, tuna, berries, pretzels  DN: chicken, vegetables   Drinks: water  Has worked with nutritionist/dietician? No  Physical Activity: resistance training 15min/day, treadmill walk    Other Potential Contributing Factors  Alcohol use: on occasion  Medications that may cause weight gain:  sertraline   Mental health: No current concerns  Eating Disorders: Denies Hx of any eating disorder  Comorbidities: dyslipidemias    Non-Pharmacological Therapy  Weight loss techniques attempted:  Commercial weight loss program: Weight Watchers     Pharmacological Therapy  Current Medications: Wegovy 0.25mg once weekly (Thursday)  Previous Medications: none     Clarifications to above regimen: none  Adverse Effects: nausea day after injection    Insurance coverage of weight-loss medications? Yes, Wegovy Prior Authorization approved through 4/14/25  Eligible for copay cards/programs? Yes    Medication Estimated Cost Expected weight " loss Patient Risks/Cautions Notes   Wegovy (semaglutide) ~$650/month w/ savings card 10-20% None      Zepbound (tirzepatide) $650/month   w/ savings card 10-20%     Qsymia (phentermine-topiramate) $98/month via [employer] 5-10% None Controlled substance   Contrave (bupropion-naltrexone) $99/month   via [employer] 5-10% None    Adipex (phentermine) ~$15/month 3-5% None Controlled substance,  Short-term use only   Shai (OTC Orlistat) ~$60/month 3-5%  Adverse effects likely outweigh benefit.     Preventative Care  Immunizations Needed: Annual Flu  Tobacco Use: non-smoker    Objective   No Known Allergies  Social History     Social History Narrative    Not on file      Medication Review  Current Outpatient Medications   Medication Instructions    anastrozole (Arimidex) 1 mg tablet TAKE 1 TABLET (1 MG TOTAL) BY MOUTH ONCE DAILY. SWALLOW WHOLE WITH A DRINK OF WATER.    bacitracin zinc-polymyxin B 500-10,000 unit/gram ointment Apply to affected area daily    calcium carbonate (CALCIUM 500 ORAL) oral, 2tabs or 3 tabs     cholecalciferol (VITAMIN D3) 5,000 Units, oral, Daily    cyanocobalamin, vitamin B-12, (VITAMIN B-12 ORAL) 1 capsule, oral, Daily    levothyroxine (Synthroid, Levoxyl) 88 mcg tablet TAKE 1 TABLET BY MOUTH EVERY DAY    omega-3 acid ethyl esters (LOVAZA) 1 g, oral, Daily    sertraline (ZOLOFT) 100 mg, oral, Daily    Wegovy 0.5 mg, subcutaneous, Weekly      Vitals  BP Readings from Last 2 Encounters:   10/02/24 (!) 152/100   09/17/24 (!) 146/98     Labs  A1C  Lab Results   Component Value Date    HGBA1C 5.2 01/05/2024     BMP  Lab Results   Component Value Date    CALCIUM 9.6 09/03/2024     09/03/2024    K 3.6 09/03/2024    CO2 28 09/03/2024     09/03/2024    BUN 14 09/03/2024    CREATININE 0.75 09/03/2024    EGFR >90 09/03/2024     LFTs  Lab Results   Component Value Date    ALT 44 09/03/2024    AST 28 09/03/2024    ALKPHOS 77 09/03/2024    BILITOT 0.4 09/03/2024     FLP  Lab Results   Component  "Value Date    TRIG 205 (H) 01/05/2024    CHOL 196 01/05/2024    LDLF 122 (H) 03/04/2022    LDLCALC 120 (H) 01/05/2024    HDL 35.3 01/05/2024     Urine Microalbumin  No results found for: \"MICROALBCREA\"  Weight Management  Wt Readings from Last 3 Encounters:   10/02/24 100 kg (221 lb 5.5 oz)   09/17/24 101 kg (222 lb 7.1 oz)   09/11/24 101 kg (222 lb)      There is no height or weight on file to calculate BMI.     Assessment/Plan     WEIGHT LOSS:   Patient's current weight reported as 218 lbs. Has lost 4 lbs. (1.8% of TBW) since starting therapy plan.  Current regimen includes: Wegovy 0.25mg once weekly  Rationale for plan: Patient is tolerating Wegovy well; experiencing mild nausea day after injection, but reported it is not intolerable. Plan to increase Wegovy to 0.5mg once weekly for further weight loss benefits.     Medication Changes:  Increase: Wegovy from 0.25mg to 0.5mg once weekly     Patient Education:  Counseled patient on relevant MOA, expectations, side effects, duration of therapy, contraindications, administration, and monitoring parameters.  All questions and concerns addressed. Contact pharmacist with any further questions or concerns prior to next appointment.    Clinical Pharmacist follow-up: 11/20/24 9:00AM, Telehealth visit    Thank You,  Charlene Olmos, PharmD  PGY-1 Pharmacy Resident    Continue all meds under the continuation of care with the referring provider and clinical pharmacy team.  Verbal consent to manage patient's drug therapy was obtained from the patient. They were informed they may decline to participate or withdraw from participation in pharmacy services at any time.    "

## 2024-10-23 ENCOUNTER — APPOINTMENT (OUTPATIENT)
Dept: PHARMACY | Facility: HOSPITAL | Age: 39
End: 2024-10-23
Payer: COMMERCIAL

## 2024-10-23 DIAGNOSIS — E66.812 CLASS 2 OBESITY: ICD-10-CM

## 2024-10-30 ENCOUNTER — APPOINTMENT (OUTPATIENT)
Dept: HEMATOLOGY/ONCOLOGY | Facility: CLINIC | Age: 39
End: 2024-10-30
Payer: COMMERCIAL

## 2024-10-30 ENCOUNTER — INFUSION (OUTPATIENT)
Dept: HEMATOLOGY/ONCOLOGY | Facility: CLINIC | Age: 39
End: 2024-10-30
Payer: COMMERCIAL

## 2024-10-30 VITALS
WEIGHT: 219.58 LBS | OXYGEN SATURATION: 98 % | HEART RATE: 98 BPM | DIASTOLIC BLOOD PRESSURE: 84 MMHG | RESPIRATION RATE: 16 BRPM | TEMPERATURE: 97.5 F | SYSTOLIC BLOOD PRESSURE: 146 MMHG | BODY MASS INDEX: 35.44 KG/M2

## 2024-10-30 DIAGNOSIS — C50.411 MALIGNANT NEOPLASM OF UPPER-OUTER QUADRANT OF RIGHT BREAST IN FEMALE, ESTROGEN RECEPTOR POSITIVE: ICD-10-CM

## 2024-10-30 DIAGNOSIS — Z17.0 MALIGNANT NEOPLASM OF UPPER-OUTER QUADRANT OF RIGHT BREAST IN FEMALE, ESTROGEN RECEPTOR POSITIVE: ICD-10-CM

## 2024-10-30 PROCEDURE — 2500000004 HC RX 250 GENERAL PHARMACY W/ HCPCS (ALT 636 FOR OP/ED): Mod: JZ | Performed by: INTERNAL MEDICINE

## 2024-10-30 PROCEDURE — 96401 CHEMO ANTI-NEOPL SQ/IM: CPT

## 2024-10-30 RX ORDER — EPINEPHRINE 0.3 MG/.3ML
0.3 INJECTION SUBCUTANEOUS EVERY 5 MIN PRN
OUTPATIENT
Start: 2024-11-27

## 2024-10-30 RX ORDER — DIPHENHYDRAMINE HYDROCHLORIDE 50 MG/ML
50 INJECTION INTRAMUSCULAR; INTRAVENOUS AS NEEDED
OUTPATIENT
Start: 2024-11-27

## 2024-10-30 RX ORDER — ALBUTEROL SULFATE 0.83 MG/ML
3 SOLUTION RESPIRATORY (INHALATION) AS NEEDED
OUTPATIENT
Start: 2024-11-27

## 2024-10-30 RX ORDER — FAMOTIDINE 10 MG/ML
20 INJECTION INTRAVENOUS ONCE AS NEEDED
OUTPATIENT
Start: 2024-11-27

## 2024-10-31 DIAGNOSIS — C50.411 MALIGNANT NEOPLASM OF UPPER-OUTER QUADRANT OF RIGHT BREAST IN FEMALE, ESTROGEN RECEPTOR POSITIVE: Primary | ICD-10-CM

## 2024-10-31 DIAGNOSIS — Z17.0 MALIGNANT NEOPLASM OF UPPER-OUTER QUADRANT OF RIGHT BREAST IN FEMALE, ESTROGEN RECEPTOR POSITIVE: Primary | ICD-10-CM

## 2024-10-31 RX ORDER — LORAZEPAM 0.5 MG/1
0.5 TABLET ORAL EVERY 6 HOURS PRN
Qty: 15 TABLET | Refills: 0 | Status: SHIPPED | OUTPATIENT
Start: 2024-10-31 | End: 2024-11-07

## 2024-11-01 ENCOUNTER — APPOINTMENT (OUTPATIENT)
Dept: HEMATOLOGY/ONCOLOGY | Facility: CLINIC | Age: 39
End: 2024-11-01
Payer: COMMERCIAL

## 2024-11-05 ENCOUNTER — LAB (OUTPATIENT)
Dept: LAB | Facility: LAB | Age: 39
End: 2024-11-05
Payer: COMMERCIAL

## 2024-11-05 DIAGNOSIS — Z17.0 MALIGNANT NEOPLASM OF UPPER-OUTER QUADRANT OF RIGHT BREAST IN FEMALE, ESTROGEN RECEPTOR POSITIVE: ICD-10-CM

## 2024-11-05 DIAGNOSIS — E55.9 VITAMIN D DEFICIENCY: ICD-10-CM

## 2024-11-05 DIAGNOSIS — E03.9 HYPOTHYROIDISM, UNSPECIFIED: ICD-10-CM

## 2024-11-05 DIAGNOSIS — C50.411 MALIGNANT NEOPLASM OF UPPER-OUTER QUADRANT OF RIGHT BREAST IN FEMALE, ESTROGEN RECEPTOR POSITIVE: Primary | ICD-10-CM

## 2024-11-05 DIAGNOSIS — C50.411 MALIGNANT NEOPLASM OF UPPER-OUTER QUADRANT OF RIGHT BREAST IN FEMALE, ESTROGEN RECEPTOR POSITIVE: ICD-10-CM

## 2024-11-05 DIAGNOSIS — E66.812 CLASS 2 OBESITY: ICD-10-CM

## 2024-11-05 DIAGNOSIS — Z17.0 MALIGNANT NEOPLASM OF UPPER-OUTER QUADRANT OF RIGHT BREAST IN FEMALE, ESTROGEN RECEPTOR POSITIVE: Primary | ICD-10-CM

## 2024-11-05 DIAGNOSIS — E03.8 OTHER SPECIFIED HYPOTHYROIDISM: ICD-10-CM

## 2024-11-05 LAB
25(OH)D3 SERPL-MCNC: 60 NG/ML (ref 30–100)
ALBUMIN SERPL BCP-MCNC: 4.7 G/DL (ref 3.4–5)
ALP SERPL-CCNC: 67 U/L (ref 33–110)
ALT SERPL W P-5'-P-CCNC: 31 U/L (ref 7–45)
ANION GAP SERPL CALC-SCNC: 11 MMOL/L (ref 10–20)
AST SERPL W P-5'-P-CCNC: 27 U/L (ref 9–39)
BASOPHILS # BLD AUTO: 0.02 X10*3/UL (ref 0–0.1)
BASOPHILS NFR BLD AUTO: 0.5 %
BILIRUB SERPL-MCNC: 0.6 MG/DL (ref 0–1.2)
BUN SERPL-MCNC: 19 MG/DL (ref 6–23)
CALCIUM SERPL-MCNC: 9.7 MG/DL (ref 8.6–10.3)
CHLORIDE SERPL-SCNC: 102 MMOL/L (ref 98–107)
CO2 SERPL-SCNC: 29 MMOL/L (ref 21–32)
CREAT SERPL-MCNC: 0.93 MG/DL (ref 0.5–1.05)
EGFRCR SERPLBLD CKD-EPI 2021: 79 ML/MIN/1.73M*2
EOSINOPHIL # BLD AUTO: 0.12 X10*3/UL (ref 0–0.7)
EOSINOPHIL NFR BLD AUTO: 3 %
ERYTHROCYTE [DISTWIDTH] IN BLOOD BY AUTOMATED COUNT: 13.8 % (ref 11.5–14.5)
EST. AVERAGE GLUCOSE BLD GHB EST-MCNC: 117 MG/DL
GLUCOSE SERPL-MCNC: 91 MG/DL (ref 74–99)
HBA1C MFR BLD: 5.7 %
HCT VFR BLD AUTO: 43.7 % (ref 36–46)
HGB BLD-MCNC: 14 G/DL (ref 12–16)
IMM GRANULOCYTES # BLD AUTO: 0.01 X10*3/UL (ref 0–0.7)
IMM GRANULOCYTES NFR BLD AUTO: 0.2 % (ref 0–0.9)
INSULIN P FAST SERPL-ACNC: 21 UIU/ML (ref 3–25)
LYMPHOCYTES # BLD AUTO: 0.93 X10*3/UL (ref 1.2–4.8)
LYMPHOCYTES NFR BLD AUTO: 23 %
MAGNESIUM SERPL-MCNC: 1.94 MG/DL (ref 1.6–2.4)
MCH RBC QN AUTO: 28.1 PG (ref 26–34)
MCHC RBC AUTO-ENTMCNC: 32 G/DL (ref 32–36)
MCV RBC AUTO: 88 FL (ref 80–100)
MONOCYTES # BLD AUTO: 0.36 X10*3/UL (ref 0.1–1)
MONOCYTES NFR BLD AUTO: 8.9 %
NEUTROPHILS # BLD AUTO: 2.61 X10*3/UL (ref 1.2–7.7)
NEUTROPHILS NFR BLD AUTO: 64.4 %
NRBC BLD-RTO: 0 /100 WBCS (ref 0–0)
PHOSPHATE SERPL-MCNC: 5.2 MG/DL (ref 2.5–4.9)
PLATELET # BLD AUTO: 327 X10*3/UL (ref 150–450)
POTASSIUM SERPL-SCNC: 4 MMOL/L (ref 3.5–5.3)
PROT SERPL-MCNC: 8.4 G/DL (ref 6.4–8.2)
RBC # BLD AUTO: 4.99 X10*6/UL (ref 4–5.2)
SODIUM SERPL-SCNC: 138 MMOL/L (ref 136–145)
T4 FREE SERPL-MCNC: 0.72 NG/DL (ref 0.61–1.12)
TSH SERPL-ACNC: 26.98 MIU/L (ref 0.44–3.98)
VIT B12 SERPL-MCNC: >2000 PG/ML (ref 211–911)
WBC # BLD AUTO: 4.1 X10*3/UL (ref 4.4–11.3)

## 2024-11-05 PROCEDURE — 80053 COMPREHEN METABOLIC PANEL: CPT

## 2024-11-05 PROCEDURE — 84439 ASSAY OF FREE THYROXINE: CPT

## 2024-11-05 PROCEDURE — 84443 ASSAY THYROID STIM HORMONE: CPT

## 2024-11-05 PROCEDURE — 85025 COMPLETE CBC W/AUTO DIFF WBC: CPT

## 2024-11-05 PROCEDURE — 36415 COLL VENOUS BLD VENIPUNCTURE: CPT

## 2024-11-05 PROCEDURE — 83525 ASSAY OF INSULIN: CPT

## 2024-11-05 PROCEDURE — 82607 VITAMIN B-12: CPT

## 2024-11-05 PROCEDURE — 83036 HEMOGLOBIN GLYCOSYLATED A1C: CPT

## 2024-11-05 PROCEDURE — 82306 VITAMIN D 25 HYDROXY: CPT

## 2024-11-05 PROCEDURE — 83735 ASSAY OF MAGNESIUM: CPT

## 2024-11-05 PROCEDURE — 84100 ASSAY OF PHOSPHORUS: CPT

## 2024-11-05 RX ORDER — LEVOTHYROXINE SODIUM 100 UG/1
100 TABLET ORAL DAILY
Qty: 30 TABLET | Refills: 11 | Status: SHIPPED | OUTPATIENT
Start: 2024-11-05 | End: 2025-11-05

## 2024-11-08 ENCOUNTER — OFFICE VISIT (OUTPATIENT)
Dept: HEMATOLOGY/ONCOLOGY | Facility: CLINIC | Age: 39
End: 2024-11-08
Payer: COMMERCIAL

## 2024-11-08 ENCOUNTER — INFUSION (OUTPATIENT)
Dept: HEMATOLOGY/ONCOLOGY | Facility: CLINIC | Age: 39
End: 2024-11-08
Payer: COMMERCIAL

## 2024-11-08 VITALS
RESPIRATION RATE: 16 BRPM | OXYGEN SATURATION: 98 % | TEMPERATURE: 97.5 F | SYSTOLIC BLOOD PRESSURE: 168 MMHG | BODY MASS INDEX: 35.01 KG/M2 | DIASTOLIC BLOOD PRESSURE: 112 MMHG | HEART RATE: 102 BPM | WEIGHT: 216.93 LBS

## 2024-11-08 DIAGNOSIS — Z17.0 MALIGNANT NEOPLASM OF UPPER-OUTER QUADRANT OF RIGHT BREAST IN FEMALE, ESTROGEN RECEPTOR POSITIVE: ICD-10-CM

## 2024-11-08 DIAGNOSIS — C50.411 MALIGNANT NEOPLASM OF UPPER-OUTER QUADRANT OF RIGHT BREAST IN FEMALE, ESTROGEN RECEPTOR POSITIVE: ICD-10-CM

## 2024-11-08 PROCEDURE — 99417 PROLNG OP E/M EACH 15 MIN: CPT | Performed by: INTERNAL MEDICINE

## 2024-11-08 PROCEDURE — 99214 OFFICE O/P EST MOD 30 MIN: CPT | Performed by: INTERNAL MEDICINE

## 2024-11-08 ASSESSMENT — PAIN SCALES - GENERAL: PAINLEVEL_OUTOF10: 0-NO PAIN

## 2024-11-08 NOTE — PROGRESS NOTES
"Patient ID: Alice Rojo is a 39 y.o. female.  Cancer Staging   Malignant neoplasm of upper-outer quadrant of right breast in female, estrogen receptor positive  Staging form: Breast, AJCC 8th Edition  - Clinical: Stage IB (cT1c, cN1, cM0, G2, ER+, MI+, HER2-) - Signed by Liliana Barrett DO on 2/6/2024  - Pathologic: Stage IB (pT1c, pN1(sn), cM0, G3, ER+, MI+, HER2-) - Signed by Liliana Barrett DO on 4/9/2024  Oncology History   Malignant neoplasm of upper-outer quadrant of right breast in female, estrogen receptor positive   2/6/2024 Initial Diagnosis    Malignant neoplasm of upper-outer quadrant of right breast in female, estrogen receptor positive (CMS/HCC)     2/6/2024 Cancer Staged    Staging form: Breast, AJCC 8th Edition, Clinical: Stage IB (cT1c, cN1, cM0, G2, ER+, MI+, HER2-) - Signed by Liliana Barrett DO on 2/6/2024 4/9/2024 Cancer Staged    Staging form: Breast, AJCC 8th Edition, Pathologic: Stage IB (pT1c, pN1(sn), cM0, G3, ER+, MI+, HER2-) - Signed by Liliana Barrett DO on 4/9/2024       Subjective    HPI  Ms. Luz Rojo \"Alfonzo" is a 40 y/o female presenting for follow-up for breast cancer. Currently on anastrozole. Here for her 1st dose of Zometa today.    Blood work from 11/5/24 shows CBC unremarkable, TSH and Vit D normal.    Patient denies any new bone pain. She has noticed since RT she had some stiffness and limited ROM and was seeing a lymphatic therapist at [facility] but insurance doesn't cover so she plans to start some PT here. She thinks it is helping. She started Wegovy and is down 10 lbs with minimal side effects. She is a little nauseas the first day or two but nothing else. She denies any LLE. No new cough or SOB. No chest pain or pressure. No other major complaints at this time.     Patient's past medical history, surgical history, family history and social history reviewed.    Objective      Vitals:    11/08/24 1204   BP: (!) 168/112   Pulse: 102   Resp: 16 "   Temp: 36.4 °C (97.5 °F)   SpO2: 98%     Review of Systems:   Review of Systems:    Positive per HPI, otherwise negative.    Physical Exam  Gen: appears well in clinic, NAD  HEENT: atraumatic head, normocephalic, EOMI, conjunctiva normal  LUNG: no increased WOB, CTAB  CV: No JVD. RRR  GI: soft, NT, ND  LE: no LE edema  Skin: no obvious rashes or lesions on visible skin  Neuro: interactive, no focal deficits noted  Psych: normal mood and affect  Performance Status:  Symptomatic; fully ambulatory    Labs/Imaging/Pathology: personally reviewed reports and images in Epic electronic medical record system. Pertinent results as it related to the plan represented in below in assessment and plan.   Assessment/Plan   1.Right breast IDC, stage IB ypT1c (20mm)pN1 (2/6LNs) M0, ER 60-70%, OR 80-90% , HER 2 IHC 1+  - initially diagnosed after abnormal mammogram first noted 1/24/2024 at [facility]  - Follow-up diagnostic imaging on 1/26/2024 reveals a spiculated mass at 10:30 10cmFN measuring 2cm on imaging.  There are also 2 abnormal axillary lymph nodes  - 1/30/2024 core bx of right breast mass path and LN revealed IDC grade 2 and axillary node bx same day revealed metastatic breast carciboma. ER 60-70%, OR 80-90% , HER 2 IHC 1+  - staging scans with CT c/a/p and bone scan 2/6/24 were negative.  - 3/22/24 right breast partial mastectomy path revealed 20 mm focus of invasive cancer, G3, along with Grade 3 DCIS. Margins negative. 2 out of 6 lymph nodes positive. Final staging pT1c pN1a.     - Today we discussed the role of adjuvant chemotherapy given higher clinical risk factors with size of tumor and node positive disease in setting of premenopausal state  - We did discuss there is a role for anthracycline-based therapy given her premenopausal status and node positive disease putting her at higher risk per the ABC trials (Rashawn, et al JCO 2017) we discussed with hormone positive being lower risk,  the benefit from anthracyclines compared  to taxotere plus cytoxan is less clear and after reviewing side effects from both, patient is hesitant about the potential side effects from anthracycline especially as she has hx of hyperemesis along with concern about secondary leukemia  - we discussed Taxotere plus cytoxan is a very reasonable alternative  - We also reviewed that much of the benefit of chemotherapy comes from the ovarian suppression affect and she is open to ovarian suppression long-term.  - 4/22/24 C1D1  Taxotere plus cytoxan included Emend pre-med in addition to dexamethasone 3 days post and the night before due to her concern for hyperemesis (as she had with pregnancy)  - cooling cap      5/13/24:  - overall tolerated C1 very well with minimal toxicity, reports minimal nausea  - does have some trouble with sleep and will decrease dex to daily after treatment to see if still manageable  - changes in dose or premeds today  - No contraindications to proceed with cycle 2  - Will continue Ativan for anxiety as premed  - Patient is tolerating treatment   - We discussed that there will be no dose adjustment at this time   - Patient would like to cancel day 8 visits and call the office PRN.   - Patient has no other major complaints at this time   - RTC in 3 weeks for cycle 3        6/3/24:   - Patient overall tolerated cycle 2 very well, her main complaint was constipation.   - Discuss with her and she can up titrate Miralax.  - Will also send a prescription for Lactulose to use if severe constipation.  - No neuropathy, no other GI complaints.  - No contraindications to proceed with cycle 3 today.  - Labs reviewed, slight elevation in ALT is likely from the chemotherapy and we will monitor.  - RTC in three weeks for cycle 4     6/24/24:   - Tolerated last cycle fairly well without any increased toxicity.  - She did have some days of fatigue  - no dose adjustments today  - No contraindications to proceed with cycle 4  - We did discuss that at this  point she will proceed to radiation simulation tomorrow  - I will send a prescription for Arimidex that she can start 2 weeks after finishing radiation.  - She will start Lupron in two months when I see her and we will plan for ovarian suppression.   - She did not need any refills today.   - RTC with me in 2 months     9/6/24:  - Patient tolerated RT well with some minor burn but the skin is healing now.   - She will receive Lupron today.  - She will start anastrozole which she already has at home.  - We will plan for Zometa in 2 months.  - We again reviewed the benefits of endocrine therapy.  - RTC with me in 2 months, she will see Dr. Barrett in January, and can see Ashok in May.    11/8/24:  - No evidence of recurrence on exam today.  - She is tolerating anastrozole weill with no major issues.   - We again discussed the role of Zometa and she will plan to get first dose when she returns for her next Lupron on 11/25/24.  - She is planned to see Dr. Barrett in January and I will see her with her next Zometa after that in March.     Reviewed ongoing medical problems and how they relate to her breast cancer, will continue long term monitoring.     RTC in March. This note has been transcribed using a medical scribe and there is a possibility of unintentional typing misprints.     Diagnoses and all orders for this visit:  Malignant neoplasm of upper-outer quadrant of right breast in female, estrogen receptor positive  -     Clinic Appointment Request         Ban Gallegos MD  Hematology/Oncology  [facility]    Marie Attestation  By signing my name below, IVera Scribe   attest that this documentation has been prepared under the direction and in the presence of Ban Gallegos MD.     Time Spent  Prep time on day of patient encounter: 5 minutes  Time spent directly with patient, family or caregiver: 18 minutes  Additional Time Spent on Patient Care Activities: 5 minutes  Documentation  Time: 5 minutes  Other Time Spent: 0 minutes  Total: 33 minutes

## 2024-11-19 NOTE — PROGRESS NOTES
"  Clinical Pharmacy Appointment    Patient ID: Luz Rojo \"Alfonzo" is a 39 y.o. female who presents for Obesity.    Pt is here for Follow Up appointment.   Referring Provider: Richa Naranjo MD  PCP: Richa Naranjo MD, last visit: 9/11/24, next visit: 1/15/25    Subjective   HPI  PMH significant for obesity, anxiety, depression, HLD, hypothyroidism.  Special needs/barriers to therapy: None identified    Medication System Management  Patients preferred pharmacy: [facility] home delivery  Adherence/Organization: No current concerns  Affordability/Accessibility: No current concerns    Drug Interactions  No relevant drug interactions were noted.    WEIGHT LOSS:   BMI Readings from Last 1 Encounters:   11/08/24 35.01 kg/m²   Starting weight: 222 lbs. (9/11/24)  Previous weight: 218 lbs. (10/23/24)  Current weight: 215 lbs.    Pharmacological Therapy  Current Medications: Wegovy 0.5mg once weekly (Thursday)  Previous Medications: none     Clarifications to above regimen: none  Adverse Effects: nausea day after injection    Preventative Care  Immunizations Needed: Annual Flu  Tobacco Use: non-smoker    Objective   No Known Allergies  Social History     Social History Narrative    Not on file      Medication Review  Current Outpatient Medications   Medication Instructions    anastrozole (Arimidex) 1 mg tablet TAKE 1 TABLET (1 MG TOTAL) BY MOUTH ONCE DAILY. SWALLOW WHOLE WITH A DRINK OF WATER.    bacitracin zinc-polymyxin B 500-10,000 unit/gram ointment Apply to affected area daily    calcium carbonate (CALCIUM 500 ORAL) Take by mouth. 2tabs or 3 tabs    cholecalciferol (VITAMIN D3) 5,000 Units, Daily    cyanocobalamin, vitamin B-12, (VITAMIN B-12 ORAL) 1 capsule, Daily    levothyroxine (SYNTHROID, LEVOXYL) 100 mcg, oral, Daily, Take on an empty stomach at the same time each day, either 30 to 60 minutes prior to breakfast    LORazepam (ATIVAN) 0.5 mg, oral, Every 6 hours PRN    omega-3 acid ethyl esters " "(LOVAZA) 1 g, oral, Daily    sertraline (ZOLOFT) 100 mg, oral, Daily    Wegovy 0.5 mg, subcutaneous, Weekly      Vitals  BP Readings from Last 2 Encounters:   11/08/24 (!) 168/112   10/30/24 146/84     Labs  A1C  Lab Results   Component Value Date    HGBA1C 5.7 (H) 11/05/2024    HGBA1C 5.2 01/05/2024     BMP  Lab Results   Component Value Date    CALCIUM 9.7 11/05/2024     11/05/2024    K 4.0 11/05/2024    CO2 29 11/05/2024     11/05/2024    BUN 19 11/05/2024    CREATININE 0.93 11/05/2024    EGFR 79 11/05/2024     LFTs  Lab Results   Component Value Date    ALT 31 11/05/2024    AST 27 11/05/2024    ALKPHOS 67 11/05/2024    BILITOT 0.6 11/05/2024     FLP  Lab Results   Component Value Date    TRIG 205 (H) 01/05/2024    CHOL 196 01/05/2024    LDLF 122 (H) 03/04/2022    LDLCALC 120 (H) 01/05/2024    HDL 35.3 01/05/2024     Urine Microalbumin  No results found for: \"MICROALBCREA\"  Weight Management  Wt Readings from Last 3 Encounters:   11/08/24 98.4 kg (216 lb 14.9 oz)   10/30/24 99.6 kg (219 lb 9.3 oz)   10/02/24 100 kg (221 lb 5.5 oz)      There is no height or weight on file to calculate BMI.     Assessment/Plan     WEIGHT LOSS:   Patient's current weight reported as 215 lbs. Has lost 7 lbs. (3.15% of TBW) since starting therapy plan.  Current regimen includes: Wegovy 0.5mg once weekly  Rationale for plan: Patient is tolerating Wegovy well. Plan to increase to Wegovy 1mg once weekly for further weight loss benefits.     Medication Changes:  Increase: Wegovy from 0.5mg to 1mg once weekly     Patient Education:  Counseled patient on relevant MOA, expectations, side effects, duration of therapy, contraindications, administration, and monitoring parameters.  All questions and concerns addressed. Contact pharmacist with any further questions or concerns prior to next appointment.    Clinical Pharmacist follow-up: 12/18/24 9:20AM, Telehealth visit    Thank You,  Charlene Olmos, PharmD  PGY-1 Pharmacy " Resident    Continue all meds under the continuation of care with the referring provider and clinical pharmacy team.  Verbal consent to manage patient's drug therapy was obtained from the patient. They were informed they may decline to participate or withdraw from participation in pharmacy services at any time.

## 2024-11-20 ENCOUNTER — APPOINTMENT (OUTPATIENT)
Dept: PHARMACY | Facility: HOSPITAL | Age: 39
End: 2024-11-20
Payer: COMMERCIAL

## 2024-11-20 DIAGNOSIS — E66.812 CLASS 2 OBESITY: ICD-10-CM

## 2024-11-20 RX ORDER — SEMAGLUTIDE 1 MG/.5ML
1 INJECTION, SOLUTION SUBCUTANEOUS WEEKLY
Qty: 2 ML | Refills: 0 | Status: SHIPPED | OUTPATIENT
Start: 2024-11-20

## 2024-11-27 ENCOUNTER — APPOINTMENT (OUTPATIENT)
Dept: HEMATOLOGY/ONCOLOGY | Facility: CLINIC | Age: 39
End: 2024-11-27
Payer: COMMERCIAL

## 2024-11-29 ENCOUNTER — INFUSION (OUTPATIENT)
Dept: HEMATOLOGY/ONCOLOGY | Facility: CLINIC | Age: 39
End: 2024-11-29
Payer: COMMERCIAL

## 2024-11-29 VITALS
TEMPERATURE: 96.8 F | BODY MASS INDEX: 34.52 KG/M2 | OXYGEN SATURATION: 98 % | RESPIRATION RATE: 16 BRPM | SYSTOLIC BLOOD PRESSURE: 145 MMHG | HEART RATE: 102 BPM | DIASTOLIC BLOOD PRESSURE: 88 MMHG | WEIGHT: 213.85 LBS

## 2024-11-29 DIAGNOSIS — Z17.0 MALIGNANT NEOPLASM OF UPPER-OUTER QUADRANT OF RIGHT BREAST IN FEMALE, ESTROGEN RECEPTOR POSITIVE: ICD-10-CM

## 2024-11-29 DIAGNOSIS — C50.411 MALIGNANT NEOPLASM OF UPPER-OUTER QUADRANT OF RIGHT BREAST IN FEMALE, ESTROGEN RECEPTOR POSITIVE: ICD-10-CM

## 2024-11-29 PROCEDURE — 96365 THER/PROPH/DIAG IV INF INIT: CPT | Mod: INF

## 2024-11-29 PROCEDURE — 96401 CHEMO ANTI-NEOPL SQ/IM: CPT

## 2024-11-29 PROCEDURE — 2500000004 HC RX 250 GENERAL PHARMACY W/ HCPCS (ALT 636 FOR OP/ED): Mod: JZ | Performed by: INTERNAL MEDICINE

## 2024-11-29 RX ORDER — EPINEPHRINE 0.3 MG/.3ML
0.3 INJECTION SUBCUTANEOUS EVERY 5 MIN PRN
OUTPATIENT
Start: 2025-04-22

## 2024-11-29 RX ORDER — FAMOTIDINE 10 MG/ML
20 INJECTION INTRAVENOUS ONCE AS NEEDED
Status: DISCONTINUED | OUTPATIENT
Start: 2024-11-29 | End: 2024-11-29 | Stop reason: HOSPADM

## 2024-11-29 RX ORDER — DIPHENHYDRAMINE HYDROCHLORIDE 50 MG/ML
50 INJECTION INTRAMUSCULAR; INTRAVENOUS AS NEEDED
OUTPATIENT
Start: 2024-12-25

## 2024-11-29 RX ORDER — ALBUTEROL SULFATE 0.83 MG/ML
3 SOLUTION RESPIRATORY (INHALATION) AS NEEDED
Status: DISCONTINUED | OUTPATIENT
Start: 2024-11-29 | End: 2024-11-29 | Stop reason: HOSPADM

## 2024-11-29 RX ORDER — DIPHENHYDRAMINE HYDROCHLORIDE 50 MG/ML
50 INJECTION INTRAMUSCULAR; INTRAVENOUS AS NEEDED
OUTPATIENT
Start: 2025-04-22

## 2024-11-29 RX ORDER — ZOLEDRONIC ACID 0.04 MG/ML
4 INJECTION, SOLUTION INTRAVENOUS ONCE
OUTPATIENT
Start: 2025-04-22

## 2024-11-29 RX ORDER — EPINEPHRINE 0.3 MG/.3ML
0.3 INJECTION SUBCUTANEOUS EVERY 5 MIN PRN
Status: DISCONTINUED | OUTPATIENT
Start: 2024-11-29 | End: 2024-11-29 | Stop reason: HOSPADM

## 2024-11-29 RX ORDER — FAMOTIDINE 10 MG/ML
20 INJECTION INTRAVENOUS ONCE AS NEEDED
OUTPATIENT
Start: 2025-04-22

## 2024-11-29 RX ORDER — DIPHENHYDRAMINE HYDROCHLORIDE 50 MG/ML
50 INJECTION INTRAMUSCULAR; INTRAVENOUS AS NEEDED
Status: DISCONTINUED | OUTPATIENT
Start: 2024-11-29 | End: 2024-11-29 | Stop reason: HOSPADM

## 2024-11-29 RX ORDER — ALBUTEROL SULFATE 0.83 MG/ML
3 SOLUTION RESPIRATORY (INHALATION) AS NEEDED
OUTPATIENT
Start: 2024-12-25

## 2024-11-29 RX ORDER — EPINEPHRINE 0.3 MG/.3ML
0.3 INJECTION SUBCUTANEOUS EVERY 5 MIN PRN
OUTPATIENT
Start: 2024-12-25

## 2024-11-29 RX ORDER — HEPARIN SODIUM,PORCINE/PF 10 UNIT/ML
50 SYRINGE (ML) INTRAVENOUS AS NEEDED
OUTPATIENT
Start: 2024-11-29

## 2024-11-29 RX ORDER — ALBUTEROL SULFATE 0.83 MG/ML
3 SOLUTION RESPIRATORY (INHALATION) AS NEEDED
OUTPATIENT
Start: 2025-04-22

## 2024-11-29 RX ORDER — ZOLEDRONIC ACID 0.04 MG/ML
4 INJECTION, SOLUTION INTRAVENOUS ONCE
Status: COMPLETED | OUTPATIENT
Start: 2024-11-29 | End: 2024-11-29

## 2024-11-29 RX ORDER — FAMOTIDINE 10 MG/ML
20 INJECTION INTRAVENOUS ONCE AS NEEDED
OUTPATIENT
Start: 2024-12-25

## 2024-11-29 RX ORDER — HEPARIN 100 UNIT/ML
500 SYRINGE INTRAVENOUS AS NEEDED
OUTPATIENT
Start: 2024-11-29

## 2024-11-29 ASSESSMENT — PAIN SCALES - GENERAL: PAINLEVEL_OUTOF10: 0-NO PAIN

## 2024-11-29 NOTE — PATIENT INSTRUCTIONS
Per Dr Gallegos please start vinegar soaks to your right hand middle finger. 1/2 part water and the other 1/2 vinegar. Please call PCP if no improvement for poss. Antibiotics.  Please make dentist aware you received zometa 11/29/24 and avoid invasive dental work within 4-6 weeks following zometa administration.

## 2024-12-02 DIAGNOSIS — L03.90 ACUTE CELLULITIS: Primary | ICD-10-CM

## 2024-12-02 DIAGNOSIS — E66.812 CLASS 2 OBESITY: ICD-10-CM

## 2024-12-02 RX ORDER — CEPHALEXIN 500 MG/1
500 CAPSULE ORAL 2 TIMES DAILY
Qty: 14 CAPSULE | Refills: 0 | Status: SHIPPED | OUTPATIENT
Start: 2024-12-02 | End: 2024-12-09

## 2024-12-05 ENCOUNTER — APPOINTMENT (OUTPATIENT)
Dept: OCCUPATIONAL THERAPY | Facility: CLINIC | Age: 39
End: 2024-12-05
Payer: COMMERCIAL

## 2024-12-12 ENCOUNTER — EVALUATION (OUTPATIENT)
Dept: OCCUPATIONAL THERAPY | Facility: CLINIC | Age: 39
End: 2024-12-12
Payer: COMMERCIAL

## 2024-12-12 DIAGNOSIS — R29.898 RUE WEAKNESS: Primary | ICD-10-CM

## 2024-12-12 PROCEDURE — 97166 OT EVAL MOD COMPLEX 45 MIN: CPT | Mod: GO

## 2024-12-12 PROCEDURE — 97530 THERAPEUTIC ACTIVITIES: CPT | Mod: GO

## 2024-12-12 ASSESSMENT — COLUMBIA-SUICIDE SEVERITY RATING SCALE - C-SSRS
6. HAVE YOU EVER DONE ANYTHING, STARTED TO DO ANYTHING, OR PREPARED TO DO ANYTHING TO END YOUR LIFE?: NO
2. HAVE YOU ACTUALLY HAD ANY THOUGHTS OF KILLING YOURSELF?: NO

## 2024-12-12 ASSESSMENT — PATIENT HEALTH QUESTIONNAIRE - PHQ9
1. LITTLE INTEREST OR PLEASURE IN DOING THINGS: NOT AT ALL
SUM OF ALL RESPONSES TO PHQ9 QUESTIONS 1 AND 2: 0
2. FEELING DOWN, DEPRESSED OR HOPELESS: NOT AT ALL

## 2024-12-12 NOTE — PROGRESS NOTES
Occupational Therapy        Occupational Therapy Evaluation/ Re evaluation    Name: Luz Rojo  MRN: [id number]  : 1985   DATE: 24   Time Calculation  Start Time: 1600  Stop Time: 1630  Time Calculation (min): 30 min     OT Evaluation Time Entry  OT Evaluation (Moderate) Time Entry: 15  OT Therapeutic Procedures Time Entry  Therapeutic Activity Time Entry: 15                     Evaluation date, progress note date: 24    Insurance Authorization Request:   S/P LYMPH NODE REMOVAL 3/2024, REQ STEFF // Laurie confirmed 12/10/24 3:46pm   30% COINS, 6000 DED (MET), 9450 OOP MAX, VS MED NEC, NO AUTH     Precautions:  NA    Subjective   Chief complaint: Since radiation pt reporting stiffness RUE    Homeliving/Social Support: , 8 year old boy, 6 year old girl     Work: Teacher, 4 days a week, [employer]     Meaningful Activities: Dinner, [facility], family     Previous Level of Function Per Patient/Caregiver Report: I  with ADL, IADL, work and leisure activities    Patient stated goals for therapy: Loosen up RUE    DOI: 1.Right breast IDC, stage IB ypT1c (20mm)pN1 (2/6LNs) M0, ER 60-70%, [place] 80-90% , HER 2 IHC 1+  - initially diagnosed after abnormal mammogram first noted 2024 at [facility]  - Follow-up diagnostic imaging on 2024 reveals a spiculated mass at 10:30 10cmFN measuring 2cm on imaging.  There are also 2 abnormal axillary lymph nodes  - 2024 core bx of right breast mass path and LN revealed IDC grade 2 and axillary node bx same day revealed metastatic breast carciboma. ER 60-70%, MA 80-90% , HER 2 IHC 1+  - staging scans with CT c/a/p and bone scan 24 were negative.  - 3/22/24 right breast partial mastectomy path revealed 20 mm focus of invasive cancer, G3, along with Grade 3 DCIS. Margins negative. 2 out of 6 lymph nodes positive. Final staging pT1c pN1a.    Pt did have 20 radiation treatments.  Hand Dominance: RUE   Affected Extremity: RUE    Mechanism of  Injury:   Refer above    Objective   UE Assessment  Observation:   No edema     Palpation:   Mild stiff soreness per ptr with palpation     Pain Assessment:  Location: NA  0/10 at rest, 0/10 at highest  Description: NA     Range of Motion (in degrees)  Shoulder AROM Right Left   Flexion WFL but painful with RUE, with 70 degrees abduction on RUE pt reporting tightness, stuck feeling     Extension     Abduction       Elbow AROM Right Left   Flexion WFL BUE    Extension       Wrist AROM Right Left   Flexion WFL BUE    Extension     Ulnar deviation     Radial deviation     Pronation     Supination       Thumb AROM Right Left   Radial abduction WFL BUE    Palmar abduction     Opposition to small finger     Opposition to RF     Kapandji score       Digit AROM Right Left   Flexion WFL BUE    Extension       Sensation:   Right Left   WFL WFL     Strength:   Right Left    70 80   Lateral pinch 22 20   Three point pinch 20 20   Shoulder flexion 4/5 4/5   Shoulder extension 4/5 4/5   Elbow flexion 4/5 4/5   Elbow extension 4/5 4/5   Wrist flexion 4/5 4/5   Wrist extension 4/5 4/5     Outcome Measures:  Other assessments   Lymph scale 8    RUE   Palm 20.5  Wrist 17  5 cm 19.5  10 cm 23.5  15 cm 27  20 cm 27  25 cm 27  30 cm 29  35 cm 36    LUE  Palm 19.5  Wrist 17  5 cm 19.5  10 cm 23.5  15 cm 27  20 cm 27  25 cm 27  30 cm 28  35 cm 38    Treatment Performed:   Pt arrived 15 minutes late  Education OT role, goals, POC, demos understanding via teach back     OP EDUCATION:  Educated pt on role of OT, goals, POC, precautions, safety, pt demonstrates good understanding via teach back.     Assessment: Patient is a  38 y/o FM presents with c/o RUE weakness. Pt has a PMH of 1.Right breast IDC, stage IB ypT1c (20mm)pN1 (2/6LNs) M0, ER 60-70%, [place] 80-90% , HER 2 IHC 1+  - initially diagnosed after abnormal mammogram first noted 1/24/2024 at [facility]  - Follow-up diagnostic imaging on 1/26/2024 reveals a spiculated mass at 10:30 10cmFN  measuring 2cm on imaging.  There are also 2 abnormal axillary lymph nodes  - 1/30/2024 core bx of right breast mass path and LN revealed IDC grade 2 and axillary node bx same day revealed metastatic breast carciboma. ER 60-70%, [place] 80-90% , HER 2 IHC 1+  - staging scans with CT c/a/p and bone scan 2/6/24 were negative.  - 3/22/24 right breast partial mastectomy path revealed 20 mm focus of invasive cancer, G3, along with Grade 3 DCIS. Margins negative. 2 out of 6 lymph nodes positive. Final staging pT1c pN1a.    Pt did have 20 radiation treatments. Upon examination patient demonstrates decreased RUE ROM, pain limiting overall function. Activity limitations and participations restrictions include ADL, IADL, ROM. Pt to benefit from outpatient OT to address deficits, maximize function and improve QOL.  The clinical presentation of this patient is stable and their history and examination findings are consistent with a moderate complexity evaluation with good rehab potential. Patient will benefit from skilled OT to support return to all ADL/ IADL, work, and leisure activities.    Plan:  1-2 week for 8-12 weeks. Interventions to include, ADL, IADL, therapeutic exercise, neuromuscular re education, and safety awareness. Pt demonstrates good understanding via teach back, MLD, CDT, compression as indicated.     Goals:  1. Patient to demonstrate AROM RUE with no pain or tightness for dressing and grooming.  2. Patient to demonstrate proper technique and competence with HEP.  3. Patient to improve lymph measure score to WNL.  4. Pt will demonstrate I with MLD and MFR as tolerated.     Problems To Be Addressed: Knowledge of precautions, knowledge of HEP, pain and edema management, ROM/joint mobility, coordination, motor skills, strength recovery, endurance recovery, orthosis use, wear/care, precautions.     Planned Interventions include: wound care as needed, patient education/instruction, home program instruction and review,  edema control, manual therapy, motor coordination, therapeutic exercise, therapeutic activities, ADL and IADL retraining, neuromuscular re-education, dry needling, NMES, Fluidotherapy, ultrasound, Kinesiotaping, orthosis fabrication/fit training, CDT.

## 2024-12-17 NOTE — PROGRESS NOTES
"  Clinical Pharmacy Appointment    Patient ID: Luz Rojo \"Alfonzo" is a 39 y.o. female who presents for Obesity.    Pt is here for Follow Up appointment.   Referring Provider: Richa Naranjo MD  PCP: Richa Naranjo MD, last visit: 9/11/25, next visit: 1/15/25    Subjective   HPI  PMH significant for obesity, anxiety, depression, HLD, hypothyroidism.  Special needs/barriers to therapy: None identified    Medication System Management  Patients preferred pharmacy: [facility] home delivery  Adherence/Organization: No current concerns  Affordability/Accessibility: No current concerns    Drug Interactions  No relevant drug interactions were noted.    WEIGHT LOSS:   BMI Readings from Last 1 Encounters:   11/29/24 34.52 kg/m²   Starting weight: 222 lbs. (9/11/24)  Previous weight: 215 lbs. (11/20/24)  Current weight: 203 lbs.    Pharmacological Therapy  Current Medications: Wegovy 1mg once weekly (Tuesdays)  Previous Medications: none     Clarifications to above regimen: none  Adverse Effects: diarrhea     Preventative Care  Immunizations Needed: Annual Flu  Tobacco Use: non-smoker    Objective   No Known Allergies  Social History     Social History Narrative    Not on file      Medication Review  Current Outpatient Medications   Medication Instructions    anastrozole (Arimidex) 1 mg tablet TAKE 1 TABLET (1 MG TOTAL) BY MOUTH ONCE DAILY. SWALLOW WHOLE WITH A DRINK OF WATER.    bacitracin zinc-polymyxin B 500-10,000 unit/gram ointment Apply to affected area daily    calcium carbonate (CALCIUM 500 ORAL) Take by mouth. 2tabs or 3 tabs    cholecalciferol (VITAMIN D3) 5,000 Units, Daily    cyanocobalamin, vitamin B-12, (VITAMIN B-12 ORAL) 1 capsule, Daily    levothyroxine (SYNTHROID, LEVOXYL) 100 mcg, oral, Daily, Take on an empty stomach at the same time each day, either 30 to 60 minutes prior to breakfast    LORazepam (ATIVAN) 0.5 mg, oral, Every 6 hours PRN    omega-3 acid ethyl esters (LOVAZA) 1 g, oral, " "Daily    sertraline (ZOLOFT) 100 mg, oral, Daily    Wegovy 1 mg, subcutaneous, Weekly      Vitals  BP Readings from Last 2 Encounters:   11/29/24 145/88   11/08/24 (!) 168/112     Labs  A1C  Lab Results   Component Value Date    HGBA1C 5.7 (H) 11/05/2024    HGBA1C 5.2 01/05/2024     BMP  Lab Results   Component Value Date    CALCIUM 9.7 11/05/2024     11/05/2024    K 4.0 11/05/2024    CO2 29 11/05/2024     11/05/2024    BUN 19 11/05/2024    CREATININE 0.93 11/05/2024    EGFR 79 11/05/2024     LFTs  Lab Results   Component Value Date    ALT 31 11/05/2024    AST 27 11/05/2024    ALKPHOS 67 11/05/2024    BILITOT 0.6 11/05/2024     FLP  Lab Results   Component Value Date    TRIG 205 (H) 01/05/2024    CHOL 196 01/05/2024    LDLF 122 (H) 03/04/2022    LDLCALC 120 (H) 01/05/2024    HDL 35.3 01/05/2024     Urine Microalbumin  No results found for: \"MICROALBCREA\"  Weight Management  Wt Readings from Last 3 Encounters:   11/29/24 97 kg (213 lb 13.5 oz)   11/08/24 98.4 kg (216 lb 14.9 oz)   10/30/24 99.6 kg (219 lb 9.3 oz)      There is no height or weight on file to calculate BMI.     Assessment/Plan   Problem List Items Addressed This Visit    None    WEIGHT LOSS:   Patient's current weight reported as 203 lbs. Has lost 19 lbs. (8.6% of TBW) since starting therapy plan.  Current regimen includes: Wegovy 1mg once weekly  Rationale for plan: Patient missed one week of Wegovy due to stomach bug, experiencing diarrhea. No other adverse effects. Due to recent illness, plan to continue current dose of Wegovy 1mg once weekly for another month, follow up in 4 weeks.     Medication Changes:  No Medication Changes     Patient Education:  Counseled patient on relevant medication mechanisms of action, expectations, side effects, duration of therapy, contraindications, administration, and monitoring parameters.  All questions and concerns addressed. Contact pharmacist with any further questions or concerns prior to next " appointment.    Clinical Pharmacist follow-up: 1/15/25 9:40AM, Telehealth visit    Thank You,  Charlene Olmos, PharmD  PGY-1 Pharmacy Resident    Continue all meds under the continuation of care with the referring provider and clinical pharmacy team.  Verbal consent to manage patient's drug therapy was obtained from the patient. They were informed they may decline to participate or withdraw from participation in pharmacy services at any time.

## 2024-12-18 ENCOUNTER — APPOINTMENT (OUTPATIENT)
Dept: PHARMACY | Facility: HOSPITAL | Age: 39
End: 2024-12-18
Payer: COMMERCIAL

## 2024-12-18 DIAGNOSIS — E66.812 CLASS 2 OBESITY: ICD-10-CM

## 2024-12-18 RX ORDER — SEMAGLUTIDE 1 MG/.5ML
1 INJECTION, SOLUTION SUBCUTANEOUS
Qty: 2 ML | Refills: 0 | OUTPATIENT
Start: 2024-12-18

## 2024-12-18 RX ORDER — SEMAGLUTIDE 1 MG/.5ML
1 INJECTION, SOLUTION SUBCUTANEOUS WEEKLY
Qty: 2 ML | Refills: 0 | Status: SHIPPED | OUTPATIENT
Start: 2024-12-18

## 2024-12-19 ENCOUNTER — TELEPHONE (OUTPATIENT)
Dept: PHYSICAL THERAPY | Facility: HOSPITAL | Age: 39
End: 2024-12-19

## 2024-12-19 ENCOUNTER — TREATMENT (OUTPATIENT)
Dept: OCCUPATIONAL THERAPY | Facility: CLINIC | Age: 39
End: 2024-12-19
Payer: COMMERCIAL

## 2024-12-19 DIAGNOSIS — R29.898 RUE WEAKNESS: Primary | ICD-10-CM

## 2024-12-19 PROCEDURE — 97140 MANUAL THERAPY 1/> REGIONS: CPT | Mod: GO

## 2024-12-19 NOTE — PROGRESS NOTES
Occupational Therapy Treatment  Name: Luz Rojo   MRN: [id number]   : 1985   Date:      Time Calculation  Start Time: 1545  Stop Time: 1645  Time Calculation (min): 60 min        OT Therapeutic Procedures Time Entry  Manual Therapy Time Entry: 45                   Evaluation date, progress note date: 24    Insurance Authorization Request:   S/P LYMPH NODE REMOVAL 3/2024, REQ STEFF // Laurie confirmed 12/10/24 3:46pm   30% COINS, 6000 DED (MET), 9450 OOP MAX, VS MED NEC, NO AUTH     Precautions:  NA    Current Problem:   RUE weakness    Visit Number:   2    Insurance Authorization Request:   S/P LYMPH NODE REMOVAL 3/2024, REQ STEFF // Laurie confirmed 12/10/24 3:46pm   30% COINS, 6000 DED (MET), 9450 OOP MAX, VS MED NEC, NO AUTH     Assessment:   Patient is a  40 y/o FM presents with c/o RUE weakness. Pt has a PMH of 1.Right breast IDC, stage IB ypT1c (20mm)pN1 (2/6LNs) M0, ER 60-70%, [place] 80-90% , HER 2 IHC 1+  - initially diagnosed after abnormal mammogram first noted 2024 at [facility]  - Follow-up diagnostic imaging on 2024 reveals a spiculated mass at 10:30 10cmFN measuring 2cm on imaging.  There are also 2 abnormal axillary lymph nodes  - 2024 core bx of right breast mass path and LN revealed IDC grade 2 and axillary node bx same day revealed metastatic breast carciboma. ER 60-70%, [place] 80-90% , HER 2 IHC 1+  - staging scans with CT c/a/p and bone scan 24 were negative.  - 3/22/24 right breast partial mastectomy path revealed 20 mm focus of invasive cancer, G3, along with Grade 3 DCIS. Margins negative. 2 out of 6 lymph nodes positive. Final staging pT1c pN1a.    Pt did have 20 radiation treatments. Upon examination patient demonstrates decreased RUE ROM, pain limiting overall function. Activity limitations and participations restrictions include ADL, IADL, ROM. Pt to benefit from outpatient OT to address deficits, maximize function and improve QOL.  The clinical presentation of  this patient is stable and their history and examination findings are consistent with a moderate complexity evaluation with good rehab potential. Patient will benefit from skilled OT to support return to all ADL/ IADL, work, and leisure activities.    Plan:  Continue current POC.      Subjective   General:  Doing well    Pain Assessment:  Location: NA  0/10 at rest, 0/10 at highest  Description: NA     HEP Adherence/Understanding: Good     Objective  Observation: Refer to eval    Palpation: Refer to eval     Range of motion (in degrees)  AROM  Refer to eval     Sensory:  Refer to eval     Strength:  Refer to eval     Treatment Interventions:   MLD abdominal breaths x 10  Opened L axilla, opened R axilla  Built anastomoses R to L  Opened R inguinal, built anastomoses R axilla to R inguinal   Education on MLD, review required    Tolerance of session: No difficulty     Outcome Measures:  Quick Dash Score: at eval      OP EDUCATION:  Pt educated on POC, safety, progress, demonstrates good understanding via teach back.     Goals:   1. Patient to demonstrate AROM RUE with no pain or tightness for dressing and grooming.  2. Patient to demonstrate proper technique and competence with HEP.  3. Patient to improve lymph measure score to WNL.  4. Pt will demonstrate I with MLD and MFR as tolerated.     Problems To Be Addressed: Knowledge of precautions, knowledge of HEP, pain and edema management, ROM/joint mobility, coordination, motor skills, strength recovery, endurance recovery, orthosis use, wear/care, precautions.     Planned Interventions include: wound care as needed, patient education/instruction, home program instruction and review, edema control, manual therapy, motor coordination, therapeutic exercise, therapeutic activities, ADL and IADL retraining, neuromuscular re-education, dry needling, NMES, Fluidotherapy, ultrasound, Kinesiotaping, orthosis fabrication/fit training, CDT.

## 2024-12-24 ENCOUNTER — TELEPHONE (OUTPATIENT)
Dept: PHYSICAL THERAPY | Facility: HOSPITAL | Age: 39
End: 2024-12-24
Payer: COMMERCIAL

## 2024-12-24 NOTE — TELEPHONE ENCOUNTER
12/19/24 RCVD MESSAGE FROM OT PROVIDER-MAMADOU CHEW TO SCHED PT AT [facility]. PC TO PT; LMOM TO CALL TO SCHED REMAINING 9V    [employer]HC CHOICE PLUS 30V   S/P LYMPH NODE REMOVAL 3/2024   1-2 WEEK FOR 8-12 WEEKS

## 2024-12-26 ENCOUNTER — TREATMENT (OUTPATIENT)
Dept: OCCUPATIONAL THERAPY | Facility: CLINIC | Age: 39
End: 2024-12-26
Payer: COMMERCIAL

## 2024-12-26 DIAGNOSIS — N64.89 BREAST EDEMA: ICD-10-CM

## 2024-12-26 DIAGNOSIS — R29.898 RUE WEAKNESS: Primary | ICD-10-CM

## 2024-12-26 DIAGNOSIS — E66.812 CLASS 2 OBESITY: ICD-10-CM

## 2024-12-26 PROCEDURE — 97140 MANUAL THERAPY 1/> REGIONS: CPT | Mod: GO

## 2024-12-26 PROCEDURE — RXMED WILLOW AMBULATORY MEDICATION CHARGE

## 2024-12-26 PROCEDURE — 97110 THERAPEUTIC EXERCISES: CPT | Mod: GO

## 2024-12-26 RX ORDER — SEMAGLUTIDE 1 MG/.5ML
1 INJECTION, SOLUTION SUBCUTANEOUS WEEKLY
Qty: 2 ML | Refills: 0 | Status: SHIPPED | OUTPATIENT
Start: 2024-12-26

## 2024-12-26 NOTE — PROGRESS NOTES
Occupational Therapy      Occupational Therapy Treatment  Name: Luz Rojo   MRN: [id number]   : 1985   Date:          Evaluation date, progress note date: 24    Insurance Authorization Request:   S/P LYMPH NODE REMOVAL 3/2024, REQ STEFF // Laurie confirmed 12/10/24 3:46pm   30% COINS, 6000 DED (MET), 9450 OOP MAX, VS MED NEC, NO AUTH     Precautions:  NA    Current Problem:   RUE weakness    Visit Number:   3    Insurance Authorization Request:   S/P LYMPH NODE REMOVAL 3/2024, REQ STEFF // Laurie confirmed 12/10/24 3:46pm   30% COINS, 6000 DED (MET), 9450 OOP MAX, VS MED NEC, NO AUTH     Assessment:   Patient is a  38 y/o FM presents with c/o RUE weakness. Pt has a PMH of 1.Right breast IDC, stage IB ypT1c (20mm)pN1 (2/6LNs) M0, ER 60-70%, [place] 80-90% , HER 2 IHC 1+  - initially diagnosed after abnormal mammogram first noted 2024 at [facility]  - Follow-up diagnostic imaging on 2024 reveals a spiculated mass at 10:30 10cmFN measuring 2cm on imaging.  There are also 2 abnormal axillary lymph nodes  - 2024 core bx of right breast mass path and LN revealed IDC grade 2 and axillary node bx same day revealed metastatic breast carciboma. ER 60-70%, [place] 80-90% , HER 2 IHC 1+  - staging scans with CT c/a/p and bone scan 24 were negative.  - 3/22/24 right breast partial mastectomy path revealed 20 mm focus of invasive cancer, G3, along with Grade 3 DCIS. Margins negative. 2 out of 6 lymph nodes positive. Final staging pT1c pN1a.    Pt did have 20 radiation treatments. Upon examination patient demonstrates decreased RUE ROM, pain limiting overall function. Activity limitations and participations restrictions include ADL, IADL, ROM. Pt to benefit from outpatient OT to address deficits, maximize function and improve QOL.  The clinical presentation of this patient is stable and their history and examination findings are consistent with a moderate complexity evaluation with good rehab potential.  Patient will benefit from skilled OT to support return to all ADL/ IADL, work, and leisure activities.    Plan:  Continue current POC.      Subjective   General:  Doing well    Pain Assessment:  Location: NA  0/10 at rest, 0/10 at highest  Description: NA     HEP Adherence/Understanding: Good     Objective  Observation: Refer to eval    Palpation: Refer to eval     Range of motion (in degrees)  AROM  Refer to eval     Sensory:  Refer to eval     Strength:  Refer to eval     Treatment Interventions:   MLD abdominal breaths x 10  Opened L axilla, opened R axilla  Built anastomoses R to L  Opened R inguinal, built anastomoses R axilla to R inguinal   Education on MLD, review required  Forwards shoulder rolls x 10  Backwards shoulder rolls x 10   Shoulder wings x 10, VC form  Arm circles backwards x 10, forwards x 10, VC form  W exercises x 10, VC, TC form  Hands behind neck, VC form, TC form, increased time  Side wall crawls x 3, VC form, effort  Front wall crawls x 3, VC form, effort    Tolerance of session: No difficulty     Outcome Measures:  Quick Dash Score: at eval      OP EDUCATION:  Pt educated on POC, safety, progress, demonstrates good understanding via teach back.     Goals:   1. Patient to demonstrate AROM RUE with no pain or tightness for dressing and grooming.  2. Patient to demonstrate proper technique and competence with HEP.  3. Patient to improve lymph measure score to WNL.  4. Pt will demonstrate I with MLD and MFR as tolerated.     Problems To Be Addressed: Knowledge of precautions, knowledge of HEP, pain and edema management, ROM/joint mobility, coordination, motor skills, strength recovery, endurance recovery, orthosis use, wear/care, precautions.     Planned Interventions include: wound care as needed, patient education/instruction, home program instruction and review, edema control, manual therapy, motor coordination, therapeutic exercise, therapeutic activities, ADL and IADL retraining,  neuromuscular re-education, dry needling, NMES, Fluidotherapy, ultrasound, Kinesiotaping, orthosis fabrication/fit training, CDT.

## 2024-12-27 ENCOUNTER — INFUSION (OUTPATIENT)
Dept: HEMATOLOGY/ONCOLOGY | Facility: CLINIC | Age: 39
End: 2024-12-27
Payer: COMMERCIAL

## 2024-12-27 VITALS
DIASTOLIC BLOOD PRESSURE: 91 MMHG | SYSTOLIC BLOOD PRESSURE: 136 MMHG | WEIGHT: 209 LBS | TEMPERATURE: 96.6 F | OXYGEN SATURATION: 97 % | HEART RATE: 74 BPM | RESPIRATION RATE: 16 BRPM | BODY MASS INDEX: 33.73 KG/M2

## 2024-12-27 DIAGNOSIS — C50.411 MALIGNANT NEOPLASM OF UPPER-OUTER QUADRANT OF RIGHT BREAST IN FEMALE, ESTROGEN RECEPTOR POSITIVE: ICD-10-CM

## 2024-12-27 DIAGNOSIS — Z17.0 MALIGNANT NEOPLASM OF UPPER-OUTER QUADRANT OF RIGHT BREAST IN FEMALE, ESTROGEN RECEPTOR POSITIVE: ICD-10-CM

## 2024-12-27 PROCEDURE — 96401 CHEMO ANTI-NEOPL SQ/IM: CPT

## 2024-12-27 PROCEDURE — 2500000004 HC RX 250 GENERAL PHARMACY W/ HCPCS (ALT 636 FOR OP/ED): Mod: JZ | Performed by: INTERNAL MEDICINE

## 2024-12-27 RX ORDER — FAMOTIDINE 10 MG/ML
20 INJECTION INTRAVENOUS ONCE AS NEEDED
Status: DISCONTINUED | OUTPATIENT
Start: 2024-12-27 | End: 2024-12-27 | Stop reason: HOSPADM

## 2024-12-27 RX ORDER — ALBUTEROL SULFATE 0.83 MG/ML
3 SOLUTION RESPIRATORY (INHALATION) AS NEEDED
OUTPATIENT
Start: 2025-01-24

## 2024-12-27 RX ORDER — EPINEPHRINE 0.3 MG/.3ML
0.3 INJECTION SUBCUTANEOUS EVERY 5 MIN PRN
OUTPATIENT
Start: 2025-01-24

## 2024-12-27 RX ORDER — EPINEPHRINE 0.3 MG/.3ML
0.3 INJECTION SUBCUTANEOUS EVERY 5 MIN PRN
Status: DISCONTINUED | OUTPATIENT
Start: 2024-12-27 | End: 2024-12-27 | Stop reason: HOSPADM

## 2024-12-27 RX ORDER — DIPHENHYDRAMINE HYDROCHLORIDE 50 MG/ML
50 INJECTION INTRAMUSCULAR; INTRAVENOUS AS NEEDED
OUTPATIENT
Start: 2025-01-24

## 2024-12-27 RX ORDER — DIPHENHYDRAMINE HYDROCHLORIDE 50 MG/ML
50 INJECTION INTRAMUSCULAR; INTRAVENOUS AS NEEDED
Status: DISCONTINUED | OUTPATIENT
Start: 2024-12-27 | End: 2024-12-27 | Stop reason: HOSPADM

## 2024-12-27 RX ORDER — ALBUTEROL SULFATE 0.83 MG/ML
3 SOLUTION RESPIRATORY (INHALATION) AS NEEDED
Status: DISCONTINUED | OUTPATIENT
Start: 2024-12-27 | End: 2024-12-27 | Stop reason: HOSPADM

## 2024-12-27 RX ORDER — FAMOTIDINE 10 MG/ML
20 INJECTION INTRAVENOUS ONCE AS NEEDED
OUTPATIENT
Start: 2025-01-24

## 2024-12-27 ASSESSMENT — PAIN SCALES - GENERAL: PAINLEVEL_OUTOF10: 0-NO PAIN

## 2024-12-30 ENCOUNTER — PHARMACY VISIT (OUTPATIENT)
Dept: PHARMACY | Facility: CLINIC | Age: 39
End: 2024-12-30
Payer: COMMERCIAL

## 2025-01-09 DIAGNOSIS — E66.812 CLASS 2 OBESITY: Primary | ICD-10-CM

## 2025-01-09 PROCEDURE — RXMED WILLOW AMBULATORY MEDICATION CHARGE

## 2025-01-09 RX ORDER — SEMAGLUTIDE 0.5 MG/.5ML
0.5 INJECTION, SOLUTION SUBCUTANEOUS
Qty: 2 ML | Refills: 0 | Status: SHIPPED | OUTPATIENT
Start: 2025-01-09

## 2025-01-11 ENCOUNTER — PHARMACY VISIT (OUTPATIENT)
Dept: PHARMACY | Facility: CLINIC | Age: 40
End: 2025-01-11
Payer: COMMERCIAL

## 2025-01-14 NOTE — PROGRESS NOTES
"  Clinical Pharmacy Appointment    Patient ID: Luz Rojo \"Alfonzo" is a 39 y.o. female who presents for Obesity.    Pt is here for Follow Up appointment.   Referring Provider: Richa Naranjo MD  PCP: Richa Naranjo MD, last visit: 9/11/25, next visit: not scheduled    Subjective   HPI  PMH significant for obesity, anxiety, depression, HLD, hypothyroidism.  Special needs/barriers to therapy: None identified    Medication System Management  Patients preferred pharmacy: [facility]  Adherence/Organization: No current concerns  Affordability/Accessibility: No current concerns    Drug Interactions  No relevant drug interactions were noted.    WEIGHT LOSS:   BMI Readings from Last 1 Encounters:   12/27/24 33.73 kg/m²   Starting weight: 222 lbs. (9/11/24)  Previous weight: 203 lbs. (12/18/24)  Current weight: 202.8 lbs.    Pharmacological Therapy  Current Medications: Wegovy 0.5mg once weekly (Fridays)  Previous Medications: none    Clarifications to above regimen: none  Adverse Effects: sulfur burps    Preventative Care  Immunizations Needed: Annual Flu  Tobacco Use: non-smoker    Objective   No Known Allergies  Social History     Social History Narrative    Not on file      Medication Review  Current Outpatient Medications   Medication Instructions    anastrozole (Arimidex) 1 mg tablet TAKE 1 TABLET (1 MG TOTAL) BY MOUTH ONCE DAILY. SWALLOW WHOLE WITH A DRINK OF WATER.    bacitracin zinc-polymyxin B 500-10,000 unit/gram ointment Apply to affected area daily    calcium carbonate (CALCIUM 500 ORAL) Take by mouth. 2tabs or 3 tabs    cholecalciferol (VITAMIN D3) 5,000 Units, Daily    cyanocobalamin, vitamin B-12, (VITAMIN B-12 ORAL) 1 capsule, Daily    levothyroxine (SYNTHROID, LEVOXYL) 100 mcg, oral, Daily, Take on an empty stomach at the same time each day, either 30 to 60 minutes prior to breakfast    LORazepam (ATIVAN) 0.5 mg, oral, Every 6 hours PRN    omega-3 acid ethyl esters (LOVAZA) 1 g, oral, Daily    " "sertraline (ZOLOFT) 100 mg, oral, Daily    Wegovy 0.5 mg, subcutaneous, Every 7 days      Vitals  BP Readings from Last 2 Encounters:   12/27/24 (!) 136/91   11/29/24 145/88     Labs  A1C  Lab Results   Component Value Date    HGBA1C 5.7 (H) 11/05/2024    HGBA1C 5.2 01/05/2024     BMP  Lab Results   Component Value Date    CALCIUM 9.7 11/05/2024     11/05/2024    K 4.0 11/05/2024    CO2 29 11/05/2024     11/05/2024    BUN 19 11/05/2024    CREATININE 0.93 11/05/2024    EGFR 79 11/05/2024     LFTs  Lab Results   Component Value Date    ALT 31 11/05/2024    AST 27 11/05/2024    ALKPHOS 67 11/05/2024    BILITOT 0.6 11/05/2024     FLP  Lab Results   Component Value Date    TRIG 205 (H) 01/05/2024    CHOL 196 01/05/2024    LDLF 122 (H) 03/04/2022    LDLCALC 120 (H) 01/05/2024    HDL 35.3 01/05/2024     Urine Microalbumin  No results found for: \"MICROALBCREA\"  Weight Management  Wt Readings from Last 3 Encounters:   12/27/24 94.8 kg (208 lb 15.9 oz)   11/29/24 97 kg (213 lb 13.5 oz)   11/08/24 98.4 kg (216 lb 14.9 oz)      There is no height or weight on file to calculate BMI.     Assessment/Plan     WEIGHT LOSS:   Patient's current weight reported as 202.8 lbs. Has lost 19.2 lbs. (8.6% of TBW) since starting therapy plan.  Current regimen includes: Wegovy 0.5mg once weekly  Rationale for plan: Patient is tolerating current dose of Wegovy well. She reported decreased appetite and feeling ng longer. Unable to tolerate Wegovy 1mg weekly dose due to recurrent diarrhea and stomach pain. These adverse effects have subsided since decreasing back down to Wegovy 0.5mg once weekly. Plan to continue current regimen and follow up in 4 weeks.     Medication Changes:  No Medication Changes     Patient Education:  Counseled patient on relevant medication mechanisms of action, expectations, side effects, duration of therapy, contraindications, administration, and monitoring parameters.  All questions and concerns " addressed. Contact pharmacist with any further questions or concerns prior to next appointment.    Clinical Pharmacist follow-up: 2/12/25 9:40AM, Telehealth visit    Thank You,  Charlene Olmos, PharmD  PGY-1 Pharmacy Resident    Continue all meds under the continuation of care with the referring provider and clinical pharmacy team.  Verbal consent to manage patient's drug therapy was obtained from the patient. They were informed they may decline to participate or withdraw from participation in pharmacy services at any time.

## 2025-01-15 ENCOUNTER — APPOINTMENT (OUTPATIENT)
Dept: PRIMARY CARE | Facility: CLINIC | Age: 40
End: 2025-01-15
Payer: COMMERCIAL

## 2025-01-15 ENCOUNTER — LAB (OUTPATIENT)
Dept: LAB | Facility: LAB | Age: 40
End: 2025-01-15
Payer: COMMERCIAL

## 2025-01-15 ENCOUNTER — TELEMEDICINE (OUTPATIENT)
Dept: PHARMACY | Facility: HOSPITAL | Age: 40
End: 2025-01-15
Payer: COMMERCIAL

## 2025-01-15 DIAGNOSIS — E03.9 HYPOTHYROIDISM, UNSPECIFIED: ICD-10-CM

## 2025-01-15 DIAGNOSIS — E66.812 CLASS 2 OBESITY: ICD-10-CM

## 2025-01-15 LAB
T4 FREE SERPL-MCNC: 1.17 NG/DL (ref 0.78–1.48)
TSH SERPL-ACNC: 5.15 MIU/L (ref 0.44–3.98)

## 2025-01-15 PROCEDURE — 84443 ASSAY THYROID STIM HORMONE: CPT

## 2025-01-15 PROCEDURE — 84439 ASSAY OF FREE THYROXINE: CPT

## 2025-01-15 RX ORDER — LEVOTHYROXINE SODIUM 112 UG/1
112 TABLET ORAL DAILY
Qty: 30 TABLET | Refills: 11 | Status: SHIPPED | OUTPATIENT
Start: 2025-01-15 | End: 2026-01-15

## 2025-01-15 RX ORDER — SEMAGLUTIDE 0.5 MG/.5ML
0.5 INJECTION, SOLUTION SUBCUTANEOUS
Qty: 2 ML | Refills: 2 | Status: SHIPPED | OUTPATIENT
Start: 2025-01-15

## 2025-01-23 ENCOUNTER — TELEPHONE (OUTPATIENT)
Dept: OCCUPATIONAL THERAPY | Facility: HOSPITAL | Age: 40
End: 2025-01-23
Payer: COMMERCIAL

## 2025-01-23 DIAGNOSIS — N64.89 BREAST EDEMA: ICD-10-CM

## 2025-01-23 NOTE — TELEPHONE ENCOUNTER
RCVD MESSAGE FROM PROVIDER-MAMADOU (OT) TO PLACE PT ON WAIT LIST. PC TO PT TO ASK WHAT HER EXACT AVAILABILITY IS SO THAT WE CAN CONTACT HER ACCORDINGLY AND NOT FOR A DATE/TIME THAT IS NOT GOING TO WORK FOR HER. I ALSO ADV OF OPEN SLOTS THAT WE HAVE AVAIL MON 01/27 @ 2:45P & THURS 01/30 @ 9AM; DOMINIC IN REF TO ALL

## 2025-01-24 ENCOUNTER — INFUSION (OUTPATIENT)
Dept: HEMATOLOGY/ONCOLOGY | Facility: CLINIC | Age: 40
End: 2025-01-24
Payer: COMMERCIAL

## 2025-01-24 ENCOUNTER — APPOINTMENT (OUTPATIENT)
Dept: HEMATOLOGY/ONCOLOGY | Facility: CLINIC | Age: 40
End: 2025-01-24
Payer: COMMERCIAL

## 2025-01-24 VITALS
BODY MASS INDEX: 32.84 KG/M2 | SYSTOLIC BLOOD PRESSURE: 142 MMHG | OXYGEN SATURATION: 98 % | RESPIRATION RATE: 16 BRPM | DIASTOLIC BLOOD PRESSURE: 85 MMHG | HEART RATE: 85 BPM | TEMPERATURE: 97.9 F | WEIGHT: 203.48 LBS

## 2025-01-24 DIAGNOSIS — C50.411 MALIGNANT NEOPLASM OF UPPER-OUTER QUADRANT OF RIGHT BREAST IN FEMALE, ESTROGEN RECEPTOR POSITIVE: ICD-10-CM

## 2025-01-24 DIAGNOSIS — Z17.0 MALIGNANT NEOPLASM OF UPPER-OUTER QUADRANT OF RIGHT BREAST IN FEMALE, ESTROGEN RECEPTOR POSITIVE: ICD-10-CM

## 2025-01-24 PROCEDURE — 96402 CHEMO HORMON ANTINEOPL SQ/IM: CPT

## 2025-01-24 PROCEDURE — 2500000004 HC RX 250 GENERAL PHARMACY W/ HCPCS (ALT 636 FOR OP/ED): Mod: JZ | Performed by: INTERNAL MEDICINE

## 2025-01-24 RX ORDER — EPINEPHRINE 0.3 MG/.3ML
0.3 INJECTION SUBCUTANEOUS EVERY 5 MIN PRN
OUTPATIENT
Start: 2025-02-21

## 2025-01-24 RX ORDER — DIPHENHYDRAMINE HYDROCHLORIDE 50 MG/ML
50 INJECTION INTRAMUSCULAR; INTRAVENOUS AS NEEDED
OUTPATIENT
Start: 2025-02-21

## 2025-01-24 RX ORDER — FAMOTIDINE 10 MG/ML
20 INJECTION INTRAVENOUS ONCE AS NEEDED
Status: DISCONTINUED | OUTPATIENT
Start: 2025-01-24 | End: 2025-01-24 | Stop reason: HOSPADM

## 2025-01-24 RX ORDER — EPINEPHRINE 0.3 MG/.3ML
0.3 INJECTION SUBCUTANEOUS EVERY 5 MIN PRN
Status: DISCONTINUED | OUTPATIENT
Start: 2025-01-24 | End: 2025-01-24 | Stop reason: HOSPADM

## 2025-01-24 RX ORDER — DIPHENHYDRAMINE HYDROCHLORIDE 50 MG/ML
50 INJECTION INTRAMUSCULAR; INTRAVENOUS AS NEEDED
Status: DISCONTINUED | OUTPATIENT
Start: 2025-01-24 | End: 2025-01-24 | Stop reason: HOSPADM

## 2025-01-24 RX ORDER — ALBUTEROL SULFATE 0.83 MG/ML
3 SOLUTION RESPIRATORY (INHALATION) AS NEEDED
OUTPATIENT
Start: 2025-02-21

## 2025-01-24 RX ORDER — ALBUTEROL SULFATE 0.83 MG/ML
3 SOLUTION RESPIRATORY (INHALATION) AS NEEDED
Status: DISCONTINUED | OUTPATIENT
Start: 2025-01-24 | End: 2025-01-24 | Stop reason: HOSPADM

## 2025-01-24 RX ORDER — FAMOTIDINE 10 MG/ML
20 INJECTION INTRAVENOUS ONCE AS NEEDED
OUTPATIENT
Start: 2025-02-21

## 2025-01-24 RX ADMIN — LEUPROLIDE ACETATE 3.75 MG: KIT at 15:23

## 2025-01-24 ASSESSMENT — PAIN SCALES - GENERAL: PAINLEVEL_OUTOF10: 0-NO PAIN

## 2025-01-24 NOTE — PROGRESS NOTES
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  There is no evidence of local recurrence.    Continue follow up with medical oncology as scheduled.    We discussed ongoing imaging surveillance. We discussed pros and cons of MRI imaging including testing-related anxiety.  Alice will continue to think over her options regarding MRI screening.    Will follow up in the breast center with my nurse practitioner partner in 6 months for possible MRI and clinical breast exam or sooner if there are any breast concerns.  I will see Luz \"Alice\"  on an as needed basis for any concerns.    Subjective   Luz Rojo \"Alice\" is a 39 y.o. female presents today for follow up carcinoma of the right breast.     Treatment history  Surgery: 3/22/2024 right MS PM MS SLNB tumor 2cm 2/6 nodes, margins negative  Radiation: 7/15 - 8/15/2025   Systemic therapy: adjuvant TC x 4 completed 6/24/2024, ovarian suppression and anastrozole + zometa    Bilateral mammogram today: BIRADS 2, benign.      Review of Systems   Constitutional symptoms: Denies generalized fatigue.  Denies weight change, fevers/chills, difficulty sleeping   Eyes: Denies double vision, glaucoma, cataracts.  Ear/nose/throat/mouth: Denies hearing changes, sore throat, sinus problems.  Cardiovascular: No chest pain.  Denies irregular heartbeat.  Denies ankle swelling.  Respiratory: No wheezing, cough, or shortness of breath.  Gastrointestinal: No abdominal pain,  No nausea/vomiting.  No indigestion/heartburn.  No change in bowel habits.  No constipation or diarrhea.   Genitourinary: No urinary incontinence.  No urinary frequency.  No painful urination.  Musculoskeletal: No bone pain, no muscle pain, no joint pain.   Integumentary: No rash. No masses.  No changes in moles.  No easy bruising.  Neurological: No " headaches.  No tremors. No numbness/tingling.  Psychiatric: No anxiety. No depression.  Endocrine: No excessive thirst.  Not too hot or too cold.  Not tired or fatigued.    Hematological/lymphatic: No swollen glands or blood clotting problems.  No bruising.    Objective   Physical Exam  General: Alert and oriented x 3.  Mood and affect are appropriate.  HEENT: EOMI, PERRLA.   Neck: supple, no masses, no cervical adenopathy.  Cardiovascular: no lower extremity edema.  Pulmonary: breathing non labored on room air.  GI: Abdomen soft, no masses. No hepatomegaly or splenomegaly.  Lymph nodes: No supraclavicular or axillary adenopathy bilaterally.  Musculoskeletal: Full range of motion in the upper extremities bilaterally.  Neuro: denies dizziness, tremors    Breasts: The breasts were examined in both the seated and supine positions.    RIGHT: The nipple is everted without nipple discharge.  There are no skin changes, skin thickening, or dimpling. There are no masses palpated in the RIGHT breast.  Well healed axillary incision.  Some XRT skin related changes most notably in the UOQ / axillary region.  LEFT: The nipple is everted without nipple discharge.  There are no skin changes, skin thickening, or dimpling. There are no masses palpated in the LEFT breast.       Radiology review: All images and reports were personally reviewed.         Liliana Barrett DO

## 2025-01-27 ENCOUNTER — HOSPITAL ENCOUNTER (OUTPATIENT)
Dept: RADIOLOGY | Facility: HOSPITAL | Age: 40
Discharge: HOME | End: 2025-01-27
Payer: COMMERCIAL

## 2025-01-27 ENCOUNTER — OFFICE VISIT (OUTPATIENT)
Dept: SURGICAL ONCOLOGY | Facility: HOSPITAL | Age: 40
End: 2025-01-27
Payer: COMMERCIAL

## 2025-01-27 VITALS
WEIGHT: 203 LBS | HEIGHT: 66 IN | DIASTOLIC BLOOD PRESSURE: 88 MMHG | BODY MASS INDEX: 32.62 KG/M2 | RESPIRATION RATE: 16 BRPM | HEART RATE: 110 BPM | SYSTOLIC BLOOD PRESSURE: 177 MMHG

## 2025-01-27 DIAGNOSIS — C50.411 MALIGNANT NEOPLASM OF UPPER-OUTER QUADRANT OF RIGHT BREAST IN FEMALE, ESTROGEN RECEPTOR POSITIVE: ICD-10-CM

## 2025-01-27 DIAGNOSIS — Z17.0 MALIGNANT NEOPLASM OF UPPER-OUTER QUADRANT OF RIGHT BREAST IN FEMALE, ESTROGEN RECEPTOR POSITIVE: ICD-10-CM

## 2025-01-27 PROCEDURE — 3008F BODY MASS INDEX DOCD: CPT | Performed by: SURGERY

## 2025-01-27 PROCEDURE — 1036F TOBACCO NON-USER: CPT | Performed by: SURGERY

## 2025-01-27 PROCEDURE — 77062 BREAST TOMOSYNTHESIS BI: CPT

## 2025-01-27 PROCEDURE — 99214 OFFICE O/P EST MOD 30 MIN: CPT | Performed by: SURGERY

## 2025-01-27 PROCEDURE — 77066 DX MAMMO INCL CAD BI: CPT | Performed by: STUDENT IN AN ORGANIZED HEALTH CARE EDUCATION/TRAINING PROGRAM

## 2025-01-27 PROCEDURE — 77062 BREAST TOMOSYNTHESIS BI: CPT | Performed by: STUDENT IN AN ORGANIZED HEALTH CARE EDUCATION/TRAINING PROGRAM

## 2025-02-03 DIAGNOSIS — C50.411 MALIGNANT NEOPLASM OF UPPER-OUTER QUADRANT OF RIGHT BREAST IN FEMALE, ESTROGEN RECEPTOR POSITIVE: ICD-10-CM

## 2025-02-03 DIAGNOSIS — Z17.0 MALIGNANT NEOPLASM OF UPPER-OUTER QUADRANT OF RIGHT BREAST IN FEMALE, ESTROGEN RECEPTOR POSITIVE: ICD-10-CM

## 2025-02-03 RX ORDER — ANASTROZOLE 1 MG/1
1 TABLET ORAL DAILY
Qty: 30 TABLET | Refills: 4 | Status: SHIPPED | OUTPATIENT
Start: 2025-02-03

## 2025-02-05 ENCOUNTER — PHARMACY VISIT (OUTPATIENT)
Dept: PHARMACY | Facility: CLINIC | Age: 40
End: 2025-02-05
Payer: COMMERCIAL

## 2025-02-05 ENCOUNTER — APPOINTMENT (OUTPATIENT)
Dept: PRIMARY CARE | Facility: CLINIC | Age: 40
End: 2025-02-05
Payer: COMMERCIAL

## 2025-02-05 PROCEDURE — RXMED WILLOW AMBULATORY MEDICATION CHARGE

## 2025-02-06 ENCOUNTER — PATIENT MESSAGE (OUTPATIENT)
Dept: HEMATOLOGY/ONCOLOGY | Facility: CLINIC | Age: 40
End: 2025-02-06
Payer: COMMERCIAL

## 2025-02-06 ENCOUNTER — SOCIAL WORK (OUTPATIENT)
Dept: HEMATOLOGY/ONCOLOGY | Facility: CLINIC | Age: 40
End: 2025-02-06
Payer: COMMERCIAL

## 2025-02-06 NOTE — PROGRESS NOTES
Social work continues to follow this patient for ongoing assessment and support. I learned from Shanthi Steinberg RN that the patient sent a message to the team today inquiring about cost of her recent Lupron injection on 1/24/25 (see message in chart). She informed the patient that I would review and send to the correct person to help. I reviewed billing and the patient's insurance and learned that her charges are still pending with insurance for 1/24 injection; she has not yet met her deductible or OOP max and so she will likely owe something for Lupron, although will be less over time as she meets her deductible and OOP. I referred the patient to Melania العراقي, Financial Navigator, who will further review and contact the patient to discuss coverage and possible assistance options. Social work will remain available to assist this patient.    MONAE Gee, MARIZOL-S

## 2025-02-11 NOTE — PROGRESS NOTES
"  Clinical Pharmacy Appointment    Patient ID: Luz Rojo \"Alfonzo" is a 39 y.o. female who presents for Obesity.    Pt is here for Follow Up appointment.   Referring Provider: Richa Naranjo MD  PCP: Richa Naranjo MD, last visit: 9/11/25, next visit: not scheduled    Subjective   HPI  PMH significant for obesity, anxiety, depression, HLD, hypothyroidism.  Special needs/barriers to therapy: None identified    Medication System Management  Patients preferred pharmacy: [facility]  Adherence/Organization: No current concerns  Affordability/Accessibility: No current concerns    Drug Interactions  No relevant drug interactions were noted.    WEIGHT LOSS:   BMI Readings from Last 1 Encounters:   01/27/25 32.77 kg/m²   Starting weight: 222 lbs. (9/11/24)  Previous weight: 202.8 lbs. (1/15/25)  Current weight: 199 lbs.    Pharmacological Therapy  Current Medications: Wegovy 0.5mg once weekly (Fridays)  Previous Medications: none     Clarifications to above regimen: none  Adverse Effects: none    Preventative Care  Immunizations Needed: All up-to-date and documented  Tobacco Use: non-smoker    Objective   No Known Allergies  Social History     Social History Narrative    Not on file      Medication Review  Current Outpatient Medications   Medication Instructions    anastrozole (ARIMIDEX) 1 mg, oral, Daily, Swallow whole with a drink of water.    bacitracin zinc-polymyxin B 500-10,000 unit/gram ointment Apply to affected area daily    calcium carbonate (CALCIUM 500 ORAL) Take by mouth. 2tabs or 3 tabs    cholecalciferol (VITAMIN D3) 5,000 Units, Daily    cyanocobalamin, vitamin B-12, (VITAMIN B-12 ORAL) 1 capsule, Daily    levothyroxine (SYNTHROID, LEVOXYL) 112 mcg, oral, Daily, Take on an empty stomach at the same time each day, either 30 to 60 minutes prior to breakfast    LORazepam (ATIVAN) 0.5 mg, oral, Every 6 hours PRN    omega-3 acid ethyl esters (LOVAZA) 1 g, oral, Daily    sertraline (ZOLOFT) 100 mg, " "oral, Daily    Wegovy 0.5 mg, subcutaneous, Every 7 days      Vitals  BP Readings from Last 2 Encounters:   01/27/25 177/88   01/24/25 142/85     Labs  A1C  Lab Results   Component Value Date    HGBA1C 5.7 (H) 11/05/2024    HGBA1C 5.2 01/05/2024     BMP  Lab Results   Component Value Date    CALCIUM 9.7 11/05/2024     11/05/2024    K 4.0 11/05/2024    CO2 29 11/05/2024     11/05/2024    BUN 19 11/05/2024    CREATININE 0.93 11/05/2024    EGFR 79 11/05/2024     LFTs  Lab Results   Component Value Date    ALT 31 11/05/2024    AST 27 11/05/2024    ALKPHOS 67 11/05/2024    BILITOT 0.6 11/05/2024     FLP  Lab Results   Component Value Date    TRIG 205 (H) 01/05/2024    CHOL 196 01/05/2024    LDLF 122 (H) 03/04/2022    LDLCALC 120 (H) 01/05/2024    HDL 35.3 01/05/2024     Urine Microalbumin  No results found for: \"MICROALBCREA\"  Weight Management  Wt Readings from Last 3 Encounters:   01/27/25 92.1 kg (203 lb)   01/24/25 92.3 kg (203 lb 7.8 oz)   12/27/24 94.8 kg (208 lb 15.9 oz)      There is no height or weight on file to calculate BMI.     Assessment/Plan     WEIGHT LOSS:   Patient's current weight reported as 199 lbs. Has lost 23 lbs. (10% of TBW) since starting therapy plan.  Current regimen includes: Wegovy 0.5mg once weekly  Rationale for plan: Patient is tolerating current dose of Wegovy well, reported no adverse effects. She reported she is not experiencing appetite suppression anymore and would like to increase to next dose. Discussed possibility of adverse effects, especially since she experienced intolerable diarrhea and stomach pain on Wegovy 1mg previously. Patient is aware and agreeable to dose increase. Plan to increase Wegovy from 0.5mg once weekly to 1mg once weekly. Follow up in 4 weeks.     Medication Changes:  Increase: Wegovy from 0.5mg once weekly to 1mg once weekly     Patient Education:  Counseled patient on relevant medication mechanisms of action, expectations, side effects, duration " of therapy, contraindications, administration, and monitoring parameters.  All questions and concerns addressed. Contact pharmacist with any further questions or concerns prior to next appointment.    Clinical Pharmacist follow-up: 3/12/25 9:40AM, Telehealth visit    Thank You,  Charlene Olmos, PharmD  PGY-1 Pharmacy Resident    Continue all meds under the continuation of care with the referring provider and clinical pharmacy team.  Verbal consent to manage patient's drug therapy was obtained from the patient. They were informed they may decline to participate or withdraw from participation in pharmacy services at any time.

## 2025-02-12 ENCOUNTER — APPOINTMENT (OUTPATIENT)
Dept: PHARMACY | Facility: HOSPITAL | Age: 40
End: 2025-02-12
Payer: COMMERCIAL

## 2025-02-12 ENCOUNTER — PHARMACY VISIT (OUTPATIENT)
Dept: PHARMACY | Facility: CLINIC | Age: 40
End: 2025-02-12
Payer: COMMERCIAL

## 2025-02-12 DIAGNOSIS — E66.812 CLASS 2 OBESITY: ICD-10-CM

## 2025-02-12 PROCEDURE — RXMED WILLOW AMBULATORY MEDICATION CHARGE

## 2025-02-12 RX ORDER — SEMAGLUTIDE 1 MG/.5ML
1 INJECTION, SOLUTION SUBCUTANEOUS
Qty: 2 ML | Refills: 0 | Status: SHIPPED | OUTPATIENT
Start: 2025-02-12

## 2025-02-21 ENCOUNTER — INFUSION (OUTPATIENT)
Dept: HEMATOLOGY/ONCOLOGY | Facility: CLINIC | Age: 40
End: 2025-02-21
Payer: COMMERCIAL

## 2025-02-21 VITALS
OXYGEN SATURATION: 98 % | TEMPERATURE: 97.7 F | SYSTOLIC BLOOD PRESSURE: 129 MMHG | DIASTOLIC BLOOD PRESSURE: 85 MMHG | RESPIRATION RATE: 16 BRPM | HEART RATE: 77 BPM | WEIGHT: 201.39 LBS | BODY MASS INDEX: 32.51 KG/M2

## 2025-02-21 DIAGNOSIS — Z17.0 MALIGNANT NEOPLASM OF UPPER-OUTER QUADRANT OF RIGHT BREAST IN FEMALE, ESTROGEN RECEPTOR POSITIVE: ICD-10-CM

## 2025-02-21 DIAGNOSIS — C50.411 MALIGNANT NEOPLASM OF UPPER-OUTER QUADRANT OF RIGHT BREAST IN FEMALE, ESTROGEN RECEPTOR POSITIVE: ICD-10-CM

## 2025-02-21 PROCEDURE — 96401 CHEMO ANTI-NEOPL SQ/IM: CPT

## 2025-02-21 PROCEDURE — 2500000004 HC RX 250 GENERAL PHARMACY W/ HCPCS (ALT 636 FOR OP/ED): Mod: JZ | Performed by: INTERNAL MEDICINE

## 2025-02-21 RX ORDER — DIPHENHYDRAMINE HYDROCHLORIDE 50 MG/ML
50 INJECTION INTRAMUSCULAR; INTRAVENOUS AS NEEDED
OUTPATIENT
Start: 2025-03-21

## 2025-02-21 RX ORDER — DIPHENHYDRAMINE HYDROCHLORIDE 50 MG/ML
50 INJECTION INTRAMUSCULAR; INTRAVENOUS AS NEEDED
Status: DISCONTINUED | OUTPATIENT
Start: 2025-02-21 | End: 2025-02-21 | Stop reason: HOSPADM

## 2025-02-21 RX ORDER — EPINEPHRINE 0.3 MG/.3ML
0.3 INJECTION SUBCUTANEOUS EVERY 5 MIN PRN
Status: DISCONTINUED | OUTPATIENT
Start: 2025-02-21 | End: 2025-02-21 | Stop reason: HOSPADM

## 2025-02-21 RX ORDER — EPINEPHRINE 0.3 MG/.3ML
0.3 INJECTION SUBCUTANEOUS EVERY 5 MIN PRN
OUTPATIENT
Start: 2025-03-21

## 2025-02-21 RX ORDER — ALBUTEROL SULFATE 0.83 MG/ML
3 SOLUTION RESPIRATORY (INHALATION) AS NEEDED
Status: DISCONTINUED | OUTPATIENT
Start: 2025-02-21 | End: 2025-02-21 | Stop reason: HOSPADM

## 2025-02-21 RX ORDER — ALBUTEROL SULFATE 0.83 MG/ML
3 SOLUTION RESPIRATORY (INHALATION) AS NEEDED
OUTPATIENT
Start: 2025-03-21

## 2025-02-21 RX ORDER — FAMOTIDINE 10 MG/ML
20 INJECTION, SOLUTION INTRAVENOUS ONCE AS NEEDED
Status: DISCONTINUED | OUTPATIENT
Start: 2025-02-21 | End: 2025-02-21 | Stop reason: HOSPADM

## 2025-02-21 RX ORDER — FAMOTIDINE 10 MG/ML
20 INJECTION, SOLUTION INTRAVENOUS ONCE AS NEEDED
OUTPATIENT
Start: 2025-03-21

## 2025-02-21 RX ADMIN — LEUPROLIDE ACETATE 3.75 MG: KIT at 16:14

## 2025-02-21 ASSESSMENT — PAIN SCALES - GENERAL: PAINLEVEL_OUTOF10: 0-NO PAIN

## 2025-03-04 ENCOUNTER — HOSPITAL ENCOUNTER (OUTPATIENT)
Dept: RADIATION ONCOLOGY | Facility: CLINIC | Age: 40
Setting detail: RADIATION/ONCOLOGY SERIES
Discharge: HOME | End: 2025-03-04
Payer: COMMERCIAL

## 2025-03-04 VITALS
WEIGHT: 199.52 LBS | BODY MASS INDEX: 32.2 KG/M2 | HEART RATE: 84 BPM | DIASTOLIC BLOOD PRESSURE: 92 MMHG | TEMPERATURE: 97 F | RESPIRATION RATE: 18 BRPM | SYSTOLIC BLOOD PRESSURE: 134 MMHG | OXYGEN SATURATION: 98 %

## 2025-03-04 DIAGNOSIS — N64.89 BREAST EDEMA: ICD-10-CM

## 2025-03-04 DIAGNOSIS — C50.411 MALIGNANT NEOPLASM OF UPPER-OUTER QUADRANT OF RIGHT BREAST IN FEMALE, ESTROGEN RECEPTOR POSITIVE: Primary | ICD-10-CM

## 2025-03-04 DIAGNOSIS — Z17.0 MALIGNANT NEOPLASM OF UPPER-OUTER QUADRANT OF RIGHT BREAST IN FEMALE, ESTROGEN RECEPTOR POSITIVE: Primary | ICD-10-CM

## 2025-03-04 PROCEDURE — 99213 OFFICE O/P EST LOW 20 MIN: CPT | Performed by: NURSE PRACTITIONER

## 2025-03-04 ASSESSMENT — PAIN SCALES - GENERAL: PAINLEVEL_OUTOF10: 0-NO PAIN

## 2025-03-04 NOTE — PROGRESS NOTES
Radiation Oncology Follow-Up    Patient Name:  Luz Rojo  MRN:  [id number]  :  1985    Referring Provider: Liliana Barrett DO  Primary Care Provider: Richa Naranjo MD  Care Team: Patient Care Team:  Richa Naranjo MD as PCP - General  Richa Naranjo MD as PCP - [facility] PCP  MD Ban Gonzalez MD as Consulting Physician (Hematology and Oncology)  MEMO Gee as  (Oncology)  Luz HANNA Peggy, [facility] as Pharmacist (Pharmacy)  Charlene Olmos [facility] as Pharmacist (Pharmacy)    Date of Service: 3/4/2025   39 y.o. female with Malignant neoplasm of upper-outer quadrant of right breast in female, estrogen receptor positive (Multi), Clinical: Stage IB (cT1c, cN1, cM0, G2, ER+, CA+, HER2-)  Malignant neoplasm of upper-outer quadrant of right breast in female, estrogen receptor positive (Multi), Pathologic: Stage IB (pT1c, pN1(sn), cM0, G3, ER+, CA+, HER2-).  The patient completed radiotherapy as outlined below.     Oncologic History:     2024: First ever screening mammogram showed an abnormality of the right breast prompting further imaging.     2024 diagnostic imaging showed a spiculated mass at the 10:30 position 10 cm from the nipple measuring 2 cm on imaging.  There were 2 abnormal axillary lymph nodes noted.  Biopsy was recommended.     2024: Ultrasound-guided core needle biopsy showed invasive ductal carcinoma, grade 2, ER 60 to 70% positive, CA 80-90% positive, HER2 1+ nonamplified.  Biopsy of the axillary lymph node revealed metastatic breast carcinoma.     2024: Staging scans with CT chest abdomen pelvis and bone scan showed no evidence of distant metastatic disease.     She was seen by Dr. Liliana Barrett and surgical oncology to discuss treatment options.  Patient elected to proceed with lumpectomy and sentinel lymph node biopsy.     2024: Right lumpectomy and sentinel lymph node  "biopsy showed 2.0 cm invasive ductal carcinoma, grade 3 with associated high-grade DCIS measuring 2.8 cm with comedonecrosis, negative surgical margins, though the posterior margin on the DCIS was less than 2 mm, 2 out of 6 lymph nodes involved with microscopic extranodal extension, ER 60-70% positive, RI 80-90% positive, HER2-, final stage pT1c N1a anatomic stage IIa prognostic stage Ib     April 10, 2024: Patient seen by Dr. Orlando in medical oncology.  Discussed oral chemotherapy for premenopausal node positive patient and discussed Adriamycin based chemo versus TC alone.  Given concerns for side effects including emesis with Adriamycin, patient elected to proceed with TC based chemotherapy.     April 18, 2024: Patient's case discussed at multidisciplinary tumor board.  Recommendation was for referral to medical and radiation oncology.  Surgery was complete but plan to consider adjuvant chemotherapy.  Also consider  CTG 1119 (MA.39) for which she might be eligible if her Oncotype score were low.     April 23, 2024: Patient seen in integrative medicine by Dr. Elliott with plans to check vitamin levels acupuncture active.      4/22/24 C1D1  Taxotere plus cytoxan included - cooling cap. Completed 4 cycles TC 6/24/24    Plan for Zometa infusions q 6 mo.     Radiation Treatment Summary:            3D CRT: Right Breast with lymph nodes     Treatment Period Technique Fraction Dose Fractions Total Dose   Course 1 7/15/2024-8/15/2024  (days elapsed: 31)         BREAST_RNI_R 7/15/2024-8/9/2024 VMAT 266 / 266 cGy 16 / 16 4256 / 4,256 cGy         TB_BOOST_R 8/12/2024-8/15/2024 VMAT 250 / 250 cGy 4 / 4 1000 / 1,000 cGy     Adjuvant endocrine therapy with anastrozole. Monthly Lupron injections for ovarian suppression.     SUBJECTIVE  History of Present Illness:   Luz Rojo \"Alice\" is here today for routine radiation follow up/initial radiation survivorship visit.  She is doing well overall.  Right breast has healed well " and skin intact. No swelling of right arm.  She has some mild decreased ROM right arm and has experienced some right axillary cording and saw OT and now resolved. She continues on monthly ovarian suppression and taking anastrozole. No adverse effects thus far. Also getting Zometa infusions q 6 mo. Denies headaches, fever, chills, cough, SOB, chest pain, n/v/c/d or bony pain.     Review of Systems:    Review of Systems   All other systems reviewed and are negative.    Performance Status:   The Karnofsky performance scale today is 100, Fully active, able to carry on all pre-disease performed without restriction (ECOG equivalent 0).      OBJECTIVE  Vital Signs:  LMP 04/20/2024 Comment: no periods since than due to chemothearphy /per  patient statement     Current Outpatient Medications:     anastrozole (Arimidex) 1 mg tablet, Take 1 tablet (1 mg total) by mouth once daily.  Swallow whole with a drink of water., Disp: 30 tablet, Rfl: 4    bacitracin zinc-polymyxin B 500-10,000 unit/gram ointment, Apply to affected area daily, Disp: 28 g, Rfl: 0    calcium carbonate (CALCIUM 500 ORAL), Take by mouth. 2tabs or 3 tabs, Disp: , Rfl:     cholecalciferol (Vitamin D3) 5,000 Units tablet, Take 1 tablet (5,000 Units) by mouth once daily., Disp: , Rfl:     cyanocobalamin, vitamin B-12, (VITAMIN B-12 ORAL), Take 1 capsule by mouth once daily., Disp: , Rfl:     levothyroxine (Synthroid, Levoxyl) 112 mcg tablet, Take 1 tablet (112 mcg) by mouth early in the morning.. Take on an empty stomach at the same time each day, either 30 to 60 minutes prior to breakfast, Disp: 30 tablet, Rfl: 11    LORazepam (Ativan) 0.5 mg tablet, Take 1 tablet (0.5 mg) by mouth every 6 hours if needed for anxiety for up to 7 days., Disp: 15 tablet, Rfl: 0    omega-3 acid ethyl esters (Lovaza) 1 gram capsule, TAKE 1 CAPSULE (1 G) BY MOUTH ONCE DAILY., Disp: 90 capsule, Rfl: 1    semaglutide, weight loss, (Wegovy) 1 mg/0.5 mL pen injector, Inject 1 mg under  the skin every 7 days., Disp: 2 mL, Rfl: 0    sertraline (Zoloft) 100 mg tablet, Take 1 tablet (100 mg) by mouth once daily., Disp: 90 tablet, Rfl: 3     Physical Exam  Vitals reviewed.   Constitutional:       Appearance: Normal appearance.   HENT:      Head: Normocephalic and atraumatic.      Nose: Nose normal.      Mouth/Throat:      Mouth: Mucous membranes are moist.      Pharynx: Oropharynx is clear.   Eyes:      Conjunctiva/sclera: Conjunctivae normal.      Pupils: Pupils are equal, round, and reactive to light.   Cardiovascular:      Rate and Rhythm: Normal rate and regular rhythm.      Heart sounds: Normal heart sounds.   Pulmonary:      Effort: Pulmonary effort is normal.      Breath sounds: Normal breath sounds.   Chest:   Breasts:     Right: No swelling, inverted nipple, mass or nipple discharge.      Left: No swelling, inverted nipple, mass, nipple discharge or skin change.          Comments: Right breast with well healed surgical incisions. Skin intact. Mild residual tanning in radiation field.   Abdominal:      Palpations: Abdomen is soft.   Musculoskeletal:         General: No swelling. Normal range of motion.      Cervical back: Normal range of motion and neck supple.   Lymphadenopathy:      Cervical: No cervical adenopathy.      Upper Body:      Right upper body: No supraclavicular or axillary adenopathy.      Left upper body: No supraclavicular or axillary adenopathy.   Skin:     General: Skin is warm and dry.   Neurological:      General: No focal deficit present.      Mental Status: She is alert and oriented to person, place, and time.   Psychiatric:         Mood and Affect: Mood normal.         Behavior: Behavior normal.        Narrative & Impression   Interpreted By:  Dipti Samuel,   STUDY:  BI MAMMO BILATERAL DIAGNOSTIC TOMOSYNTHESIS;  1/27/2025 9:42 am      ACCESSION NUMBER(S):  [id number]      ORDERING CLINICIAN:  MORAIMA NOBLES      INDICATION:  History of right lumpectomy with chemo  radiation. Annual mammogram.      ,C50.411 Malignant neoplasm of upper-outer quadrant of right female  breast,Z17.0 Estrogen receptor positive status (ER+)      COMPARISON:  01/24/2024.      FINDINGS:  MAMMOGRAPHY: 2D and tomosynthesis images were reviewed at 1 mm slice  thickness.      Density:  There are scattered areas of fibroglandular density.      Postoperative scarring and surgical clips are seen in the deep upper  outer right breast. There is right breast skin and trabecular  thickening, consistent with post radiation changes. No suspicious  masses or calcifications are identified.      IMPRESSION:  No mammographic evidence of malignancy.      BI-RADS CATEGORY:  BI-RADS Category:  2 Benign.  Recommendation:  Annual Screening.  Recommended Date:  1 Year.  Laterality:  Bilateral.      ASSESSMENT:  39 y.o. female with stage IB right breast cancer s/p breast conserving surgery followed by adjuvant chemotherapy and radiation. Cosmesis is excellent.     PLAN:    - Mammogram and FUV with Dr. Barrett  - Continue ovarian suppression, anastrozole and follow up with Dr. Gallegos  - Radiation follow up in 6 mo.  Call us with any questions or concerns.     Ana Alberto CNP  [phone]

## 2025-03-05 DIAGNOSIS — E66.812 CLASS 2 OBESITY: ICD-10-CM

## 2025-03-05 RX ORDER — SEMAGLUTIDE 1 MG/.5ML
1 INJECTION, SOLUTION SUBCUTANEOUS
Qty: 2 ML | Refills: 0 | OUTPATIENT
Start: 2025-03-05

## 2025-03-11 NOTE — PROGRESS NOTES
"  Clinical Pharmacy Appointment    Patient ID: Luz Rojo \"Alfonzo" is a 39 y.o. female who presents for Obesity.    Pt is here for Follow Up appointment.   Referring Provider: Richa Naranjo MD  PCP: Richa Naranjo MD, last visit: 9/11/24, next visit: not scheduled    Subjective   HPI  PMH significant for obesity, anxiety, depression, HLD, hypothyroidism.  Special needs/barriers to therapy: None identified    Medication System Management  Patients preferred pharmacy: [facility]  Adherence/Organization: No current concerns  Affordability/Accessibility: No current concerns    Drug Interactions  No relevant drug interactions were noted.    WEIGHT LOSS:   BMI Readings from Last 1 Encounters:   03/04/25 32.20 kg/m²   Starting weight: 222 lbs. (9/11/24)  Previous weight: 199 lbs. (2/12/25)  Current weight: 197 lbs.    Pharmacological Therapy  Current Medications: Wegovy 1mg once weekly (Wednesdays)  Previous Medications: none     Clarifications to above regimen: none  Adverse Effects: sulfur burps few days after injection    Preventative Care  Immunizations Needed: All up-to-date and documented  Tobacco Use: non-smoker    Objective   No Known Allergies  Social History     Social History Narrative    Not on file      Medication Review  Current Outpatient Medications   Medication Instructions    anastrozole (ARIMIDEX) 1 mg, oral, Daily, Swallow whole with a drink of water.    bacitracin zinc-polymyxin B 500-10,000 unit/gram ointment Apply to affected area daily    calcium carbonate (CALCIUM 500 ORAL) Take by mouth. 2tabs or 3 tabs    cholecalciferol (VITAMIN D3) 5,000 Units, Daily    cyanocobalamin, vitamin B-12, (VITAMIN B-12 ORAL) 1 capsule, Daily    levothyroxine (SYNTHROID, LEVOXYL) 112 mcg, oral, Daily, Take on an empty stomach at the same time each day, either 30 to 60 minutes prior to breakfast    LORazepam (ATIVAN) 0.5 mg, oral, Every 6 hours PRN    omega-3 acid ethyl esters (LOVAZA) 1 g, oral, Daily " "   sertraline (ZOLOFT) 100 mg, oral, Daily    Wegovy 1 mg, subcutaneous, Every 7 days      Vitals  BP Readings from Last 2 Encounters:   03/04/25 (!) 134/92   02/21/25 129/85     Labs  A1C  Lab Results   Component Value Date    HGBA1C 5.7 (H) 11/05/2024    HGBA1C 5.2 01/05/2024     BMP  Lab Results   Component Value Date    CALCIUM 9.7 11/05/2024     11/05/2024    K 4.0 11/05/2024    CO2 29 11/05/2024     11/05/2024    BUN 19 11/05/2024    CREATININE 0.93 11/05/2024    EGFR 79 11/05/2024     LFTs  Lab Results   Component Value Date    ALT 31 11/05/2024    AST 27 11/05/2024    ALKPHOS 67 11/05/2024    BILITOT 0.6 11/05/2024     FLP  Lab Results   Component Value Date    TRIG 205 (H) 01/05/2024    CHOL 196 01/05/2024    LDLF 122 (H) 03/04/2022    LDLCALC 120 (H) 01/05/2024    HDL 35.3 01/05/2024     Urine Microalbumin  No results found for: \"MICROALBCREA\"  Weight Management  Wt Readings from Last 3 Encounters:   03/04/25 90.5 kg (199 lb 8.3 oz)   02/21/25 91.3 kg (201 lb 6.2 oz)   01/27/25 92.1 kg (203 lb)      There is no height or weight on file to calculate BMI.     Assessment/Plan     WEIGHT LOSS:   Patient's current weight reported as 197 lbs. Has lost 25 lbs. (11% of TBW) since starting therapy plan.  Current regimen includes: Wegovy 1mg once weekly  Rationale for plan: Patient is tolerating Wegovy well. She reported mild sulfur burps for a few days after each injection, no other adverse effects. Discussed increasing dose to 1.7mg due to slow rate of weight loss from last appointment (2 lbs over 4 weeks). She would like to remain on current dose at this time and reassess in 1 month. Plan to continue current medication regimen at this time. Follow up in 4 weeks.     Medication Changes:  No Medication Changes     Patient Education:  Counseled patient on relevant medication mechanisms of action, expectations, side effects, duration of therapy, contraindications, administration, and monitoring " parameters.  All questions and concerns addressed. Contact pharmacist with any further questions or concerns prior to next appointment.    Clinical Pharmacist follow-up: 4/9/25 9:40AM, Telehealth visit    Thank You,  Charlene Olmos, PharmD  PGY-1 Pharmacy Resident    Continue all meds under the continuation of care with the referring provider and clinical pharmacy team.  Verbal consent to manage patient's drug therapy was obtained from the patient. They were informed they may decline to participate or withdraw from participation in pharmacy services at any time.

## 2025-03-12 ENCOUNTER — APPOINTMENT (OUTPATIENT)
Dept: PHARMACY | Facility: HOSPITAL | Age: 40
End: 2025-03-12
Payer: COMMERCIAL

## 2025-03-12 ENCOUNTER — PHARMACY VISIT (OUTPATIENT)
Dept: PHARMACY | Facility: CLINIC | Age: 40
End: 2025-03-12
Payer: COMMERCIAL

## 2025-03-12 DIAGNOSIS — E66.812 CLASS 2 OBESITY: ICD-10-CM

## 2025-03-12 PROCEDURE — RXMED WILLOW AMBULATORY MEDICATION CHARGE

## 2025-03-12 RX ORDER — SEMAGLUTIDE 1 MG/.5ML
1 INJECTION, SOLUTION SUBCUTANEOUS
Qty: 2 ML | Refills: 0 | Status: SHIPPED | OUTPATIENT
Start: 2025-03-12

## 2025-03-21 ENCOUNTER — INFUSION (OUTPATIENT)
Dept: HEMATOLOGY/ONCOLOGY | Facility: CLINIC | Age: 40
End: 2025-03-21
Payer: COMMERCIAL

## 2025-03-21 VITALS
RESPIRATION RATE: 16 BRPM | BODY MASS INDEX: 31.47 KG/M2 | SYSTOLIC BLOOD PRESSURE: 126 MMHG | WEIGHT: 195 LBS | HEART RATE: 86 BPM | TEMPERATURE: 98.1 F | DIASTOLIC BLOOD PRESSURE: 79 MMHG | OXYGEN SATURATION: 96 %

## 2025-03-21 DIAGNOSIS — C50.411 MALIGNANT NEOPLASM OF UPPER-OUTER QUADRANT OF RIGHT BREAST IN FEMALE, ESTROGEN RECEPTOR POSITIVE: ICD-10-CM

## 2025-03-21 DIAGNOSIS — Z17.0 MALIGNANT NEOPLASM OF UPPER-OUTER QUADRANT OF RIGHT BREAST IN FEMALE, ESTROGEN RECEPTOR POSITIVE: ICD-10-CM

## 2025-03-21 PROCEDURE — 96402 CHEMO HORMON ANTINEOPL SQ/IM: CPT

## 2025-03-21 PROCEDURE — 2500000004 HC RX 250 GENERAL PHARMACY W/ HCPCS (ALT 636 FOR OP/ED): Mod: JZ | Performed by: INTERNAL MEDICINE

## 2025-03-21 PROCEDURE — 96401 CHEMO ANTI-NEOPL SQ/IM: CPT

## 2025-03-21 RX ORDER — FAMOTIDINE 10 MG/ML
20 INJECTION, SOLUTION INTRAVENOUS ONCE AS NEEDED
OUTPATIENT
Start: 2025-04-18

## 2025-03-21 RX ORDER — ALBUTEROL SULFATE 0.83 MG/ML
3 SOLUTION RESPIRATORY (INHALATION) AS NEEDED
OUTPATIENT
Start: 2025-04-18

## 2025-03-21 RX ORDER — DIPHENHYDRAMINE HYDROCHLORIDE 50 MG/ML
50 INJECTION, SOLUTION INTRAMUSCULAR; INTRAVENOUS AS NEEDED
OUTPATIENT
Start: 2025-04-18

## 2025-03-21 RX ORDER — EPINEPHRINE 0.3 MG/.3ML
0.3 INJECTION SUBCUTANEOUS EVERY 5 MIN PRN
OUTPATIENT
Start: 2025-04-18

## 2025-03-21 RX ADMIN — LEUPROLIDE ACETATE 3.75 MG: KIT at 11:27

## 2025-03-21 ASSESSMENT — PAIN SCALES - GENERAL: PAINLEVEL_OUTOF10: 0-NO PAIN

## 2025-04-07 DIAGNOSIS — Z00.00 HEALTHCARE MAINTENANCE: ICD-10-CM

## 2025-04-07 RX ORDER — OMEGA-3-ACID ETHYL ESTERS 1 G/1
1 CAPSULE, LIQUID FILLED ORAL DAILY
Qty: 90 CAPSULE | Refills: 1 | Status: SHIPPED | OUTPATIENT
Start: 2025-04-07

## 2025-04-09 ENCOUNTER — PATIENT MESSAGE (OUTPATIENT)
Dept: PHARMACY | Facility: HOSPITAL | Age: 40
End: 2025-04-09

## 2025-04-09 ENCOUNTER — TELEMEDICINE (OUTPATIENT)
Dept: PHARMACY | Facility: HOSPITAL | Age: 40
End: 2025-04-09
Payer: COMMERCIAL

## 2025-04-09 ENCOUNTER — APPOINTMENT (OUTPATIENT)
Dept: PHARMACY | Facility: HOSPITAL | Age: 40
End: 2025-04-09
Payer: COMMERCIAL

## 2025-04-09 DIAGNOSIS — E66.812 CLASS 2 OBESITY: ICD-10-CM

## 2025-04-09 RX ORDER — SEMAGLUTIDE 1 MG/.5ML
1 INJECTION, SOLUTION SUBCUTANEOUS
Qty: 2 ML | Refills: 2 | Status: SHIPPED | OUTPATIENT
Start: 2025-04-09

## 2025-04-09 NOTE — PROGRESS NOTES
"  Clinical Pharmacy Appointment    Patient ID: Luz Rojo \"Alfonzo" is a 39 y.o. female who presents for Obesity.    Pt is here for Follow Up appointment.   Referring Provider: Richa Naranjo MD  PCP: Richa Naranjo MD, last visit: 9/11/24, next visit: not scheduled    Subjective   HPI  PMH significant for obesity, anxiety, depression, HLD, hypothyroidism.  Special needs/barriers to therapy: None identified    Medication System Management  Patients preferred pharmacy: [facility]  Adherence/Organization: No current concerns  Affordability/Accessibility: No current concerns    Drug Interactions  No relevant drug interactions were noted.    WEIGHT LOSS:   BMI Readings from Last 1 Encounters:   03/21/25 31.47 kg/m²   Starting weight: 222 lbs. (9/11/24)  Previous weight: 197 lbs. (3/12/25)  Current weight: 193 lbs.    Patient Reported Goal Weight: 145 lbs    Pharmacological Therapy  Current Medications: Wegovy 1mg once weekly (Wednesdays)   Previous Medications: none     Clarifications to above regimen: none  Adverse Effects: none    Preventative Care  Immunizations Needed: All up-to-date and documented  Tobacco Use: non-smoker    Objective   No Known Allergies  Social History     Social History Narrative    Not on file      Medication Review  Current Outpatient Medications   Medication Instructions    anastrozole (ARIMIDEX) 1 mg, oral, Daily, Swallow whole with a drink of water.    bacitracin zinc-polymyxin B 500-10,000 unit/gram ointment Apply to affected area daily    calcium carbonate (CALCIUM 500 ORAL) Take by mouth. 2tabs or 3 tabs    cholecalciferol (VITAMIN D3) 5,000 Units, Daily    cyanocobalamin, vitamin B-12, (VITAMIN B-12 ORAL) 1 capsule, Daily    levothyroxine (SYNTHROID, LEVOXYL) 112 mcg, oral, Daily, Take on an empty stomach at the same time each day, either 30 to 60 minutes prior to breakfast    LORazepam (ATIVAN) 0.5 mg, oral, Every 6 hours PRN    omega-3 acid ethyl esters (LOVAZA) 1 g, " "oral, Daily    sertraline (ZOLOFT) 100 mg, oral, Daily    Wegovy 1 mg, subcutaneous, Every 7 days      Vitals  BP Readings from Last 2 Encounters:   03/21/25 126/79   03/04/25 (!) 134/92     Labs  A1C  Lab Results   Component Value Date    HGBA1C 5.7 (H) 11/05/2024    HGBA1C 5.2 01/05/2024     BMP  Lab Results   Component Value Date    CALCIUM 9.7 11/05/2024     11/05/2024    K 4.0 11/05/2024    CO2 29 11/05/2024     11/05/2024    BUN 19 11/05/2024    CREATININE 0.93 11/05/2024    EGFR 79 11/05/2024     LFTs  Lab Results   Component Value Date    ALT 31 11/05/2024    AST 27 11/05/2024    ALKPHOS 67 11/05/2024    BILITOT 0.6 11/05/2024     FLP  Lab Results   Component Value Date    TRIG 205 (H) 01/05/2024    CHOL 196 01/05/2024    LDLF 122 (H) 03/04/2022    LDLCALC 120 (H) 01/05/2024    HDL 35.3 01/05/2024     Urine Microalbumin  No results found for: \"MICROALBCREA\"  Weight Management  Wt Readings from Last 3 Encounters:   03/21/25 88.5 kg (195 lb)   03/04/25 90.5 kg (199 lb 8.3 oz)   02/21/25 91.3 kg (201 lb 6.2 oz)      There is no height or weight on file to calculate BMI.     Assessment/Plan     WEIGHT LOSS:   Patient's current weight reported as 193 lbs. Has lost 29 lbs. (13% of TBW) since starting therapy plan.  Current regimen includes: Wegovy 1mg once weekly  Rationale for plan: Patient is tolerating Wegovy well, no adverse effects reported. Continuing to lose weight at an appropriate rate of 1-2 lbs per week. Due to efficacy and patient preference, plan to continue Wegovy 1mg weekly. Follow up in 6 weeks.     Medication Changes:  No Medication Changes     Patient Education:  Counseled patient on relevant medication mechanisms of action, expectations, side effects, duration of therapy, contraindications, administration, and monitoring parameters.  All questions and concerns addressed. Contact pharmacist with any further questions or concerns prior to next appointment.    Clinical Pharmacist " follow-up: 5/21/25 1:00PM, Telehealth visit    Thank You,  Charlene Olmos, PharmD  PGY-1 Pharmacy Resident    Continue all meds under the continuation of care with the referring provider and clinical pharmacy team.  Verbal consent to manage patient's drug therapy was obtained from the patient. They were informed they may decline to participate or withdraw from participation in pharmacy services at any time.

## 2025-04-14 ENCOUNTER — PHARMACY VISIT (OUTPATIENT)
Dept: PHARMACY | Facility: CLINIC | Age: 40
End: 2025-04-14
Payer: COMMERCIAL

## 2025-04-14 DIAGNOSIS — Z00.00 HEALTHCARE MAINTENANCE: ICD-10-CM

## 2025-04-14 PROCEDURE — RXMED WILLOW AMBULATORY MEDICATION CHARGE

## 2025-04-14 RX ORDER — OMEGA-3-ACID ETHYL ESTERS 1 G/1
1 CAPSULE, LIQUID FILLED ORAL DAILY
Qty: 90 CAPSULE | Refills: 1 | Status: SHIPPED | OUTPATIENT
Start: 2025-04-14

## 2025-04-18 ENCOUNTER — APPOINTMENT (OUTPATIENT)
Dept: HEMATOLOGY/ONCOLOGY | Facility: CLINIC | Age: 40
End: 2025-04-18
Payer: COMMERCIAL

## 2025-04-25 ENCOUNTER — APPOINTMENT (OUTPATIENT)
Dept: HEMATOLOGY/ONCOLOGY | Facility: CLINIC | Age: 40
End: 2025-04-25
Payer: COMMERCIAL

## 2025-04-25 ENCOUNTER — INFUSION (OUTPATIENT)
Dept: HEMATOLOGY/ONCOLOGY | Facility: CLINIC | Age: 40
End: 2025-04-25
Payer: COMMERCIAL

## 2025-04-25 VITALS
SYSTOLIC BLOOD PRESSURE: 118 MMHG | HEART RATE: 96 BPM | HEIGHT: 67 IN | OXYGEN SATURATION: 96 % | RESPIRATION RATE: 16 BRPM | WEIGHT: 191.47 LBS | DIASTOLIC BLOOD PRESSURE: 91 MMHG | TEMPERATURE: 97.7 F | BODY MASS INDEX: 30.05 KG/M2

## 2025-04-25 DIAGNOSIS — Z17.0 MALIGNANT NEOPLASM OF UPPER-OUTER QUADRANT OF RIGHT BREAST IN FEMALE, ESTROGEN RECEPTOR POSITIVE: ICD-10-CM

## 2025-04-25 DIAGNOSIS — C50.411 MALIGNANT NEOPLASM OF UPPER-OUTER QUADRANT OF RIGHT BREAST IN FEMALE, ESTROGEN RECEPTOR POSITIVE: ICD-10-CM

## 2025-04-25 PROCEDURE — 2500000004 HC RX 250 GENERAL PHARMACY W/ HCPCS (ALT 636 FOR OP/ED): Mod: JZ | Performed by: INTERNAL MEDICINE

## 2025-04-25 PROCEDURE — 96401 CHEMO ANTI-NEOPL SQ/IM: CPT

## 2025-04-25 RX ORDER — DIPHENHYDRAMINE HYDROCHLORIDE 50 MG/ML
50 INJECTION, SOLUTION INTRAMUSCULAR; INTRAVENOUS AS NEEDED
OUTPATIENT
Start: 2025-05-16

## 2025-04-25 RX ORDER — ALBUTEROL SULFATE 0.83 MG/ML
3 SOLUTION RESPIRATORY (INHALATION) AS NEEDED
OUTPATIENT
Start: 2025-05-16

## 2025-04-25 RX ORDER — FAMOTIDINE 10 MG/ML
20 INJECTION, SOLUTION INTRAVENOUS ONCE AS NEEDED
OUTPATIENT
Start: 2025-05-16

## 2025-04-25 RX ORDER — EPINEPHRINE 0.3 MG/.3ML
0.3 INJECTION SUBCUTANEOUS EVERY 5 MIN PRN
OUTPATIENT
Start: 2025-05-16

## 2025-04-25 RX ADMIN — LEUPROLIDE ACETATE 3.75 MG: KIT at 13:35

## 2025-04-25 ASSESSMENT — PAIN SCALES - GENERAL: PAINLEVEL_OUTOF10: 0-NO PAIN

## 2025-04-30 DIAGNOSIS — F32.0 DEPRESSION, MAJOR, SINGLE EPISODE, MILD (CMS-HCC): Primary | ICD-10-CM

## 2025-04-30 RX ORDER — SERTRALINE HYDROCHLORIDE 100 MG/1
100 TABLET, FILM COATED ORAL DAILY
Qty: 90 TABLET | Refills: 0 | Status: SHIPPED | OUTPATIENT
Start: 2025-04-30

## 2025-05-01 DIAGNOSIS — C50.411 MALIGNANT NEOPLASM OF UPPER-OUTER QUADRANT OF RIGHT BREAST IN FEMALE, ESTROGEN RECEPTOR POSITIVE: ICD-10-CM

## 2025-05-01 DIAGNOSIS — Z17.0 MALIGNANT NEOPLASM OF UPPER-OUTER QUADRANT OF RIGHT BREAST IN FEMALE, ESTROGEN RECEPTOR POSITIVE: ICD-10-CM

## 2025-05-01 RX ORDER — ANASTROZOLE 1 MG/1
1 TABLET ORAL DAILY
Qty: 30 TABLET | Refills: 4 | Status: SHIPPED | OUTPATIENT
Start: 2025-05-01

## 2025-05-02 ENCOUNTER — PHARMACY VISIT (OUTPATIENT)
Dept: PHARMACY | Facility: CLINIC | Age: 40
End: 2025-05-02

## 2025-05-12 DIAGNOSIS — E78.49 OTHER HYPERLIPIDEMIA: ICD-10-CM

## 2025-05-12 DIAGNOSIS — E55.9 VITAMIN D DEFICIENCY: Primary | ICD-10-CM

## 2025-05-12 DIAGNOSIS — Z00.00 HEALTH CARE MAINTENANCE: ICD-10-CM

## 2025-05-12 DIAGNOSIS — E03.8 OTHER SPECIFIED HYPOTHYROIDISM: ICD-10-CM

## 2025-05-14 PROCEDURE — RXMED WILLOW AMBULATORY MEDICATION CHARGE

## 2025-05-15 ENCOUNTER — APPOINTMENT (OUTPATIENT)
Dept: HEMATOLOGY/ONCOLOGY | Facility: CLINIC | Age: 40
End: 2025-05-15
Payer: COMMERCIAL

## 2025-05-15 ENCOUNTER — PHARMACY VISIT (OUTPATIENT)
Dept: PHARMACY | Facility: CLINIC | Age: 40
End: 2025-05-15
Payer: COMMERCIAL

## 2025-05-16 DIAGNOSIS — R53.83 OTHER FATIGUE: Primary | ICD-10-CM

## 2025-05-19 NOTE — PROGRESS NOTES
"  Patient ID: Luz Rojo \"Alfonzo" is a 39 y.o. female.  Diagnosis:  Right Breast IDC   MedOnc: Dr. Gallegos   SurgOnc: Dr. Barrett  RadOn: Dr. Richter/ Ana Alberto NP  Primary Care Provider: Richa Naranjo MD  Referring Provider: No referring provider defined for this encounter.  Visit Type: Follow Up    Date of Service: 05/19/25  Location: [facility]     Patient Care Team:  Richa Naranjo MD as PCP - General  Richa Naranjo MD as PCP - [facility] PCP  MD Ban Gonzalez MD as Consulting Physician (Hematology and Oncology)  MEMO Gee as  (Oncology)  Luz Woods [facility] as Pharmacist (Pharmacy)  Charlene Olmos PharmD as Pharmacist (Pharmacy)    Current Therapy: Anastrozole + Lupron and Zometa     ONCOLOGIC HISTORY     Malignant neoplasm of upper-outer quadrant of right breast in female, estrogen receptor positive, Clinical: Stage IB (cT1c, cN1, cM0, G2, ER+, VT+, HER2-)  Malignant neoplasm of upper-outer quadrant of right breast in female, estrogen receptor positive, Pathologic: Stage IB (pT1c, pN1(sn), cM0, G3, ER+, VT+, HER2-)    Right breast IDC, stage IB pT1c (20mm) pN1 (2/6LNs) M0, ER 60-70%, VT 80-90% , HER 2 IHC 1+  1/24/2024: abnormal mammogram  1/26/2024: diagnostic imaging - spiculated mass measuring 2cm, 2 abnormal axillary lymph nodes  1/30/2024: core bx of right breast mass revealed IDC grade 2 and axillary node bx revealed metastatic breast carcinoma. ER 60-70%, VT 80-90% , HER 2 IHC 1+  2/6/2024: staging scans CT c/a/p and bone scan were negative  3/22/2024: right breast partial mastectomy path revealed 20 mm focus of invasive cancer, G3, along with Grade 3 DCIS. Margins negative. 2 out of 6 lymph nodes positive. Final staging pT1c pN1a.  4/22/2024: C1D1 Taxotere plus cytoxan    6/24/2024: Completed Cycle 4 TC   7/15/2024-8/15/2024: XRT  9/6/2024: Started monthly Lupron and daily anastrozole   11/29/2024: started Q6 month " "Zometa    ***    Oncology History   Malignant neoplasm of upper-outer quadrant of right breast in female, estrogen receptor positive   2/6/2024 Initial Diagnosis    Malignant neoplasm of upper-outer quadrant of right breast in female, estrogen receptor positive (CMS/HCC)     2/6/2024 Cancer Staged    Staging form: Breast, AJCC 8th Edition, Clinical: Stage IB (cT1c, cN1, cM0, G2, ER+, MD+, HER2-) - Signed by Liliana Barrett DO on 2/6/2024 4/9/2024 Cancer Staged    Staging form: Breast, AJCC 8th Edition, Pathologic: Stage IB (pT1c, pN1(sn), cM0, G3, ER+, MD+, HER2-) - Signed by Liliana Barrett DO on 4/9/2024        Other Contributing History      Subjective      INTERVAL HISTORY     Luz Rojo \"Alfonzo" is a 39 y.o. female who presents today for follow up of breast cancer. Patient of Dr. Gallegos currently on AI + Lupron and Zometa.     Review of Systems - Oncology    Objective      LMP 04/20/2024 Comment: no periods since than due to chemothearphy /per  patient statement  BSA: There is no height or weight on file to calculate BSA.    Wt Readings from Last 5 Encounters:   04/25/25 86.8 kg (191 lb 7.5 oz)   03/21/25 88.5 kg (195 lb)   03/04/25 90.5 kg (199 lb 8.3 oz)   02/21/25 91.3 kg (201 lb 6.2 oz)   01/27/25 92.1 kg (203 lb)     Performance Status:  {ECOG Score:64611:::1}  {Karnofsky Score:69516:::1}    PHYSICAL EXAM   Physical Exam  Allergies  RX Allergies[1]   Medications  Current Outpatient Medications   Medication Instructions    anastrozole (ARIMIDEX) 1 mg, oral, Daily, Swallow whole with a drink of water.    bacitracin zinc-polymyxin B 500-10,000 unit/gram ointment Apply to affected area daily    calcium carbonate (CALCIUM 500 ORAL) Take by mouth. 2tabs or 3 tabs    cholecalciferol (VITAMIN D3) 5,000 Units, Daily    cyanocobalamin, vitamin B-12, (VITAMIN B-12 ORAL) 1 capsule, Daily    levothyroxine (SYNTHROID, LEVOXYL) 112 mcg, oral, Daily, Take on an empty stomach at the same time each day, either " 30 to 60 minutes prior to breakfast    LORazepam (ATIVAN) 0.5 mg, oral, Every 6 hours PRN    omega-3 acid ethyl esters (LOVAZA) 1 g, oral, Daily    sertraline (ZOLOFT) 100 mg, oral, Daily    Wegovy 1 mg, subcutaneous, Every 7 days        Diagnostic Results   RESULTS   No results found. However, due to the size of the patient record, not all encounters were searched. Please check Results Review for a complete set of results.    Recent Imaging     Assessment/Plan   ASSESSMENT     Luz Rojo is a 39 y.o. female with right breast IDC, stage IB pT1c (20mm) pN1 (2/6LNs) M0, ER 60-70%, MI 80-90% , HER 2 IHC 1+ diagnosed in January 2024 s/p partial mastectomy, 4 cycles of adjuvant TC, and breast XRT who presents in follow up on Lupron for ovarian suppression, anastrozole - endocrine therapy, and Zometa for bone health.       PLAN     # Breast cancer  - Continue anastrozole 1 mg daily  - Lupron 3.75 mg monthly injections - due today  - Follow up with rad onc and surg onc as scheduled     # Bone Health   - Zometa today and every 6 months  - Weight Bearing exercise   - Calcium and Vit D ***      Follow up ***    There are no diagnoses linked to this encounter.  Venous Access Orders  Leuprolide Acetate, Every 28 Days  Zoledronic Acid (Zometa), Every 24 Weeks    Patient verbalizes understanding of above plan. Time provided for patient's questions. All questions answered to patient's satisfaction in office. Patient instructed to reach out for any new concerning issues at [phone].    Natasha Staples MSN, APRN, A-GNP-C, AOCNP  [facility]  Division of Hematology & Medical Oncology   [facility]   [id number] [place] [address] [address] [place] [address]  Phone: [phone]  Available via [email or web address] Secure Chat  [email or web address]       [1] No Known Allergies

## 2025-05-20 ENCOUNTER — LAB (OUTPATIENT)
Dept: LAB | Facility: CLINIC | Age: 40
End: 2025-05-20
Payer: COMMERCIAL

## 2025-05-20 DIAGNOSIS — Z17.0 MALIGNANT NEOPLASM OF UPPER-OUTER QUADRANT OF RIGHT BREAST IN FEMALE, ESTROGEN RECEPTOR POSITIVE: ICD-10-CM

## 2025-05-20 DIAGNOSIS — C50.411 MALIGNANT NEOPLASM OF UPPER-OUTER QUADRANT OF RIGHT BREAST IN FEMALE, ESTROGEN RECEPTOR POSITIVE: ICD-10-CM

## 2025-05-20 LAB
25(OH)D3 SERPL-MCNC: 79 NG/ML (ref 30–100)
ALBUMIN SERPL BCP-MCNC: 4.4 G/DL (ref 3.4–5)
ALP SERPL-CCNC: 64 U/L (ref 33–110)
ALT SERPL W P-5'-P-CCNC: 16 U/L (ref 7–45)
ANION GAP SERPL CALC-SCNC: 12 MMOL/L (ref 10–20)
AST SERPL W P-5'-P-CCNC: 17 U/L (ref 9–39)
BASOPHILS # BLD AUTO: 0.02 X10*3/UL (ref 0–0.1)
BASOPHILS NFR BLD AUTO: 0.5 %
BILIRUB SERPL-MCNC: 0.6 MG/DL (ref 0–1.2)
BUN SERPL-MCNC: 19 MG/DL (ref 6–23)
CALCIUM SERPL-MCNC: 9.6 MG/DL (ref 8.6–10.3)
CHLORIDE SERPL-SCNC: 102 MMOL/L (ref 98–107)
CHOLEST SERPL-MCNC: 209 MG/DL (ref 0–199)
CHOLESTEROL/HDL RATIO: 5.8
CO2 SERPL-SCNC: 27 MMOL/L (ref 21–32)
CREAT SERPL-MCNC: 0.88 MG/DL (ref 0.5–1.05)
EGFRCR SERPLBLD CKD-EPI 2021: 85 ML/MIN/1.73M*2
EOSINOPHIL # BLD AUTO: 0.1 X10*3/UL (ref 0–0.7)
EOSINOPHIL NFR BLD AUTO: 2.5 %
ERYTHROCYTE [DISTWIDTH] IN BLOOD BY AUTOMATED COUNT: 12.5 % (ref 11.5–14.5)
FERRITIN SERPL-MCNC: 72 NG/ML (ref 8–150)
GLUCOSE SERPL-MCNC: 94 MG/DL (ref 74–99)
HCT VFR BLD AUTO: 39.3 % (ref 36–46)
HDLC SERPL-MCNC: 36.2 MG/DL
HGB BLD-MCNC: 13.3 G/DL (ref 12–16)
IMM GRANULOCYTES # BLD AUTO: 0 X10*3/UL (ref 0–0.7)
IMM GRANULOCYTES NFR BLD AUTO: 0 % (ref 0–0.9)
IRON SATN MFR SERPL: 19 % (ref 25–45)
IRON SERPL-MCNC: 71 UG/DL (ref 35–150)
LDLC SERPL CALC-MCNC: 141 MG/DL
LYMPHOCYTES # BLD AUTO: 1 X10*3/UL (ref 1.2–4.8)
LYMPHOCYTES NFR BLD AUTO: 24.5 %
MAGNESIUM SERPL-MCNC: 1.9 MG/DL (ref 1.6–2.4)
MCH RBC QN AUTO: 30.8 PG (ref 26–34)
MCHC RBC AUTO-ENTMCNC: 33.8 G/DL (ref 32–36)
MCV RBC AUTO: 91 FL (ref 80–100)
MONOCYTES # BLD AUTO: 0.34 X10*3/UL (ref 0.1–1)
MONOCYTES NFR BLD AUTO: 8.3 %
NEUTROPHILS # BLD AUTO: 2.62 X10*3/UL (ref 1.2–7.7)
NEUTROPHILS NFR BLD AUTO: 64.2 %
NON HDL CHOLESTEROL: 173 MG/DL (ref 0–149)
PHOSPHATE SERPL-MCNC: 4.2 MG/DL (ref 2.5–4.9)
PLATELET # BLD AUTO: 266 X10*3/UL (ref 150–450)
POTASSIUM SERPL-SCNC: 4 MMOL/L (ref 3.5–5.3)
PROT SERPL-MCNC: 8.4 G/DL (ref 6.4–8.2)
RBC # BLD AUTO: 4.32 X10*6/UL (ref 4–5.2)
SODIUM SERPL-SCNC: 137 MMOL/L (ref 136–145)
TIBC SERPL-MCNC: 373 UG/DL (ref 240–445)
TRIGL SERPL-MCNC: 158 MG/DL (ref 0–149)
TSH SERPL-ACNC: 3.07 MIU/L (ref 0.44–3.98)
UIBC SERPL-MCNC: 302 UG/DL (ref 110–370)
VIT B12 SERPL-MCNC: 812 PG/ML (ref 211–911)
VLDL: 32 MG/DL (ref 0–40)
WBC # BLD AUTO: 4.1 X10*3/UL (ref 4.4–11.3)

## 2025-05-20 PROCEDURE — 82306 VITAMIN D 25 HYDROXY: CPT | Performed by: INTERNAL MEDICINE

## 2025-05-20 PROCEDURE — 83735 ASSAY OF MAGNESIUM: CPT

## 2025-05-20 PROCEDURE — 80053 COMPREHEN METABOLIC PANEL: CPT

## 2025-05-20 PROCEDURE — 82728 ASSAY OF FERRITIN: CPT | Performed by: INTERNAL MEDICINE

## 2025-05-20 PROCEDURE — 84443 ASSAY THYROID STIM HORMONE: CPT | Performed by: INTERNAL MEDICINE

## 2025-05-20 PROCEDURE — 36415 COLL VENOUS BLD VENIPUNCTURE: CPT

## 2025-05-20 PROCEDURE — 85025 COMPLETE CBC W/AUTO DIFF WBC: CPT | Performed by: INTERNAL MEDICINE

## 2025-05-20 PROCEDURE — 84100 ASSAY OF PHOSPHORUS: CPT

## 2025-05-20 PROCEDURE — 83540 ASSAY OF IRON: CPT | Performed by: INTERNAL MEDICINE

## 2025-05-20 PROCEDURE — 82607 VITAMIN B-12: CPT | Performed by: INTERNAL MEDICINE

## 2025-05-20 PROCEDURE — 80061 LIPID PANEL: CPT | Performed by: INTERNAL MEDICINE

## 2025-05-20 NOTE — PROGRESS NOTES
"  Patient ID: Luz Rojo \"Alfonzo" is a 39 y.o. female.  Diagnosis:  Right Breast IDC   MedOnc: Dr. Gallegos   SurgOnc: Dr. Barrett  RadOn: Dr. Richter/ Ana Alberto NP  Primary Care Provider: Richa Naranjo MD  Referring Provider: No referring provider defined for this encounter.  Visit Type: Follow Up    Date of Service: 05/22/25  Location: [facility]     Patient Care Team:  Richa Naranjo MD as PCP - General  Richa Naranjo MD as PCP - [facility] PCP  MD Ban Gonzalez MD as Consulting Physician (Hematology and Oncology)  MEMO Gee as  (Oncology)  Luz Woods [facility] as Pharmacist (Pharmacy)  Charlene Olmos PharmD as Pharmacist (Pharmacy)    Current Therapy: Anastrozole + Lupron and Zometa     ONCOLOGIC HISTORY     Malignant neoplasm of upper-outer quadrant of right breast in female, estrogen receptor positive, Clinical: Stage IB (cT1c, cN1, cM0, G2, ER+, DC+, HER2-)  Malignant neoplasm of upper-outer quadrant of right breast in female, estrogen receptor positive, Pathologic: Stage IB (pT1c, pN1(sn), cM0, G3, ER+, DC+, HER2-)    Right breast IDC, stage IB pT1c (20mm) pN1 (2/6LNs) M0, ER 60-70%, DC 80-90% , HER 2 IHC 1+  1/24/2024: abnormal mammogram  1/26/2024: diagnostic imaging - spiculated mass measuring 2cm, 2 abnormal axillary lymph nodes  1/30/2024: core bx of right breast mass revealed IDC grade 2 and axillary node bx revealed metastatic breast carcinoma. ER 60-70%, DC 80-90% , HER 2 IHC 1+  2/6/2024: staging scans CT c/a/p and bone scan were negative  3/22/2024: right breast partial mastectomy path revealed 20 mm focus of invasive cancer, G3, along with Grade 3 DCIS. Margins negative. 2 out of 6 lymph nodes positive. Final staging pT1c pN1a.  4/22/2024: C1D1 Taxotere plus cytoxan    6/24/2024: Completed Cycle 4 TC   7/15/2024-8/15/2024: XRT  9/6/2024: Started monthly Lupron and daily anastrozole   11/29/2024: started Q6 month " "Zometa      Oncology History   Malignant neoplasm of upper-outer quadrant of right breast in female, estrogen receptor positive   2/6/2024 Initial Diagnosis    Malignant neoplasm of upper-outer quadrant of right breast in female, estrogen receptor positive (CMS/HCC)     2/6/2024 Cancer Staged    Staging form: Breast, AJCC 8th Edition, Clinical: Stage IB (cT1c, cN1, cM0, G2, ER+, MI+, HER2-) - Signed by Liliana Barrett DO on 2/6/2024 4/9/2024 Cancer Staged    Staging form: Breast, AJCC 8th Edition, Pathologic: Stage IB (pT1c, pN1(sn), cM0, G3, ER+, MI+, HER2-) - Signed by Liliana Barrett DO on 4/9/2024        Other Contributing History      Subjective      INTERVAL HISTORY     Luz Rojo \"Alfonzo" is a 39 y.o. female who presents today for follow up of breast cancer. Patient of Dr. Gallegos currently on AI + Lupron and Zometa. Doing well. Continues to tolerate treatment. Mild tolerable hot flashes and joint pain. Stretching and yoga helps. She is exercising. Energy and appetite are normal. Losing weight slowly on wegovy. She denies fevers, no signs of infection. Denies dental issues. Had a root canal last year, no issues since. Sees dentist regularly.     Review of Systems   Constitutional:  Negative for appetite change, chills, diaphoresis, fatigue, fever and unexpected weight change.   HENT:  Negative.     Respiratory: Negative.  Negative for cough and shortness of breath.    Cardiovascular: Negative.  Negative for chest pain.   Gastrointestinal:  Negative for abdominal distention, constipation, diarrhea, nausea and vomiting.   Endocrine: Positive for hot flashes.   Genitourinary: Negative.     Musculoskeletal:  Positive for arthralgias.   Skin: Negative.    Neurological: Negative.    Hematological: Negative.    Psychiatric/Behavioral: Negative.         Objective      /77 (BP Location: Right arm, Patient Position: Sitting, BP Cuff Size: Adult)   Pulse 86   Temp 36.7 °C (98.1 °F) (Temporal)   Resp " 16   Wt 86.4 kg (190 lb 7.6 oz)   LMP 04/20/2024 Comment: no periods since than due to chemothearphy /per  patient statement  SpO2 98%   BMI 30.22 kg/m²   BSA: 2.01 meters squared    Wt Readings from Last 5 Encounters:   05/22/25 86.4 kg (190 lb 7.6 oz)   04/25/25 86.8 kg (191 lb 7.5 oz)   03/21/25 88.5 kg (195 lb)   03/04/25 90.5 kg (199 lb 8.3 oz)   02/21/25 91.3 kg (201 lb 6.2 oz)     Performance Status:  ECOG Score: 0- Fully active, able to carry on all pre-disease performance w/o restriction.  Karnofsky Score: 90 - Able to carry on normal activity; minor signs or symptoms of disease     PHYSICAL EXAM   Physical Exam  Constitutional:       General: She is not in acute distress.     Appearance: Normal appearance. She is not toxic-appearing.   HENT:      Head: Normocephalic and atraumatic.      Mouth/Throat:      Mouth: Mucous membranes are moist.      Pharynx: Oropharynx is clear.   Eyes:      Pupils: Pupils are equal, round, and reactive to light.   Pulmonary:      Effort: Pulmonary effort is normal.   Musculoskeletal:         General: Normal range of motion.      Cervical back: Normal range of motion.      Right lower leg: No edema.      Left lower leg: No edema.   Skin:     General: Skin is warm and dry.   Neurological:      General: No focal deficit present.      Mental Status: She is alert and oriented to person, place, and time.      Motor: No weakness.   Psychiatric:         Mood and Affect: Mood normal.         Behavior: Behavior normal.         Thought Content: Thought content normal.         Judgment: Judgment normal.       Allergies  RX Allergies[1]   Medications  Current Outpatient Medications   Medication Instructions    anastrozole (ARIMIDEX) 1 mg, oral, Daily, Swallow whole with a drink of water.    calcium carbonate (CALCIUM 500 ORAL) Take by mouth. 2tabs or 3 tabs    cholecalciferol (VITAMIN D3) 5,000 Units, Daily    levothyroxine (SYNTHROID, LEVOXYL) 112 mcg, oral, Daily, Take on an empty  stomach at the same time each day, either 30 to 60 minutes prior to breakfast    LORazepam (ATIVAN) 0.5 mg, oral, Every 6 hours PRN    omega-3 acid ethyl esters (LOVAZA) 1 g, oral, Daily    sertraline (ZOLOFT) 100 mg, oral, Daily    Wegovy 1.7 mg, subcutaneous, Every 7 days        Diagnostic Results   RESULTS     Results for orders placed or performed in visit on 05/20/25 (from the past 96 hours)   Comprehensive metabolic panel   Result Value Ref Range    Glucose 94 74 - 99 mg/dL    Sodium 137 136 - 145 mmol/L    Potassium 4.0 3.5 - 5.3 mmol/L    Chloride 102 98 - 107 mmol/L    Bicarbonate 27 21 - 32 mmol/L    Anion Gap 12 10 - 20 mmol/L    Urea Nitrogen 19 6 - 23 mg/dL    Creatinine 0.88 0.50 - 1.05 mg/dL    eGFR 85 >60 mL/min/1.73m*2    Calcium 9.6 8.6 - 10.3 mg/dL    Albumin 4.4 3.4 - 5.0 g/dL    Alkaline Phosphatase 64 33 - 110 U/L    Total Protein 8.4 (H) 6.4 - 8.2 g/dL    AST 17 9 - 39 U/L    Bilirubin, Total 0.6 0.0 - 1.2 mg/dL    ALT 16 7 - 45 U/L   Magnesium   Result Value Ref Range    Magnesium 1.90 1.60 - 2.40 mg/dL   Phosphorus   Result Value Ref Range    Phosphorus 4.2 2.5 - 4.9 mg/dL       Recent Imaging     Assessment/Plan   ASSESSMENT     Luz Rojo is a 39 y.o. female with right breast IDC, stage IB pT1c (20mm) pN1 (2/6LNs) M0, ER 60-70%, NM 80-90% , HER 2 IHC 1+ diagnosed in January 2024 s/p partial mastectomy, 4 cycles of adjuvant TC, and breast XRT who presents in follow up on Lupron for ovarian suppression, anastrozole - endocrine therapy, and Zometa for bone health.     Labs are unremarkable.   She continues on Vit D and Calcium. No dental issues. Performing weight bearing exercise.   She continues to tolerate treatment without toxicities. Mild joint aches and hot flashes as expected.  Some flu like symptoms after her first Zometa - lasted a couple days. Patient can take Tylenol and Claritin for the next couple days and should hydrate well with water.      PLAN     # Breast cancer  -  "Continue anastrozole 1 mg daily  - Lupron 3.75 mg monthly injections - due today  - Follow up with rad onc and surg onc as scheduled     # Bone Health   - Zometa today and every 6 months  - Continue Weight Bearing exercise   - Continue Calcium and Vit D    - Tylenol and Claritin for a couple days with each Zometa  -  ml IV bolus with Zometa, good PO hydration at home     Follow up in 6 months with Dr. Cortez and next Zometa.     Luz \"Alice\" was seen today for follow-up.  Diagnoses and all orders for this visit:  Malignant neoplasm of upper-outer quadrant of right breast in female, estrogen receptor positive (Primary)  -     Clinic Appointment Request Follow Up; DAVE CORTEZ; [facility] MEDONC1; Future    Venous Access Orders  Leuprolide Acetate, Every 28 Days  Zoledronic Acid (Zometa), Every 24 Weeks    Patient verbalizes understanding of above plan. Time provided for patient's questions. All questions answered to patient's satisfaction in office. Patient instructed to reach out for any new concerning issues at [phone].    Natasha Staples MSN, APRN, A-GNP-C, AOCNP  [facility]  Division of Hematology & Medical Oncology   [address] [place] [address]  Phone: [phone]  Available via [employer] Secure Chat  [email or web address]         [1] No Known Allergies    "

## 2025-05-20 NOTE — PROGRESS NOTES
"  Clinical Pharmacy Appointment    Patient ID: Luz Rjoo \"Alfonzo" is a 39 y.o. female who presents for Obesity.    Pt is here for Follow Up appointment.   Referring Provider: Richa Naranjo MD  PCP: Richa Naranjo MD, last visit: 9/11/24, next visit: 6/9/25    Subjective   HPI  PMH significant for obesity, anxiety, depression, HLD, hypothyroidism.  Special needs/barriers to therapy: None identified    Medication System Management  Patients preferred pharmacy: [facility]  Adherence/Organization: No current concerns  Affordability/Accessibility: No current concerns    Drug Interactions  No relevant drug interactions were noted.    WEIGHT LOSS:   BMI Readings from Last 1 Encounters:   04/25/25 30.37 kg/m²   Starting weight: 222 lbs. (9/11/24)  Previous weight: 193 lbs. (4/9/25)  Current weight: 188 lbs.    Patient Reported Goal Weight: 145 lbs    Pharmacological Therapy  Current Medications: Wegovy 1mg once weekly (Wednesdays)   Previous Medications: none     Clarifications to above regimen: none  Adverse Effects: none    Preventative Care  Immunizations Needed: All up-to-date and documented  Tobacco Use: non-smoker    Objective   Allergies[1]  Social History     Social History Narrative    Not on file      Medication Review  Current Outpatient Medications   Medication Instructions    anastrozole (ARIMIDEX) 1 mg, oral, Daily, Swallow whole with a drink of water.    bacitracin zinc-polymyxin B 500-10,000 unit/gram ointment Apply to affected area daily    calcium carbonate (CALCIUM 500 ORAL) Take by mouth. 2tabs or 3 tabs    cholecalciferol (VITAMIN D3) 5,000 Units, Daily    cyanocobalamin, vitamin B-12, (VITAMIN B-12 ORAL) 1 capsule, Daily    levothyroxine (SYNTHROID, LEVOXYL) 112 mcg, oral, Daily, Take on an empty stomach at the same time each day, either 30 to 60 minutes prior to breakfast    LORazepam (ATIVAN) 0.5 mg, oral, Every 6 hours PRN    omega-3 acid ethyl esters (LOVAZA) 1 g, oral, Daily    " "sertraline (ZOLOFT) 100 mg, oral, Daily    Wegovy 1 mg, subcutaneous, Every 7 days      Vitals  BP Readings from Last 2 Encounters:   04/25/25 (!) 118/91   03/21/25 126/79     Labs  A1C  Lab Results   Component Value Date    HGBA1C 5.7 (H) 11/05/2024    HGBA1C 5.2 01/05/2024     BMP  Lab Results   Component Value Date    CALCIUM 9.6 05/20/2025     05/20/2025    K 4.0 05/20/2025    CO2 27 05/20/2025     05/20/2025    BUN 19 05/20/2025    CREATININE 0.88 05/20/2025    EGFR 85 05/20/2025     LFTs  Lab Results   Component Value Date    ALT 16 05/20/2025    AST 17 05/20/2025    ALKPHOS 64 05/20/2025    BILITOT 0.6 05/20/2025     FLP  Lab Results   Component Value Date    TRIG 158 (H) 05/20/2025    CHOL 209 (H) 05/20/2025    LDLF 122 (H) 03/04/2022    LDLCALC 141 (H) 05/20/2025    HDL 36.2 05/20/2025     Urine Microalbumin  No results found for: \"MICROALBCREA\"  Weight Management  Wt Readings from Last 3 Encounters:   04/25/25 86.8 kg (191 lb 7.5 oz)   03/21/25 88.5 kg (195 lb)   03/04/25 90.5 kg (199 lb 8.3 oz)      There is no height or weight on file to calculate BMI.     Assessment/Plan   Problem List Items Addressed This Visit       Class 2 obesity    Patient's current weight reported as 188 lbs. Has lost 34 lbs. (15% of TBW) since starting therapy plan.  Current regimen includes: Wegovy 1mg once weekly   Rationale for plan: Patient is tolerating Wegovy 1mg dose well. No adverse effects reported. Due to indication for further weight loss and patient preference, plan to increase Wegovy to 1.7mg once weekly. Follow up in 4 weeks.     Medication Changes:  Increase: Wegovy from 1mg to 1.7mg once weekly           Patient Education:  Counseled patient on relevant medication mechanisms of action, expectations, side effects, duration of therapy, contraindications, administration, and monitoring parameters.  All questions and concerns addressed. Contact pharmacist with any further questions or concerns prior to " next appointment.    Clinical Pharmacist follow-up: 6/18/25 1:00PM, Telehealth visit    Thank You,  Charlene Olmos, PharmD  PGY-1 Pharmacy Resident    Continue all meds under the continuation of care with the referring provider and clinical pharmacy team.  Verbal consent to manage patient's drug therapy was obtained from the patient. They were informed they may decline to participate or withdraw from participation in pharmacy services at any time.         [1] No Known Allergies

## 2025-05-21 ENCOUNTER — APPOINTMENT (OUTPATIENT)
Dept: PHARMACY | Facility: HOSPITAL | Age: 40
End: 2025-05-21
Payer: COMMERCIAL

## 2025-05-21 DIAGNOSIS — E66.812 CLASS 2 OBESITY: ICD-10-CM

## 2025-05-21 PROCEDURE — RXMED WILLOW AMBULATORY MEDICATION CHARGE

## 2025-05-21 RX ORDER — SEMAGLUTIDE 1.7 MG/.75ML
1.7 INJECTION, SOLUTION SUBCUTANEOUS
Qty: 3 ML | Refills: 2 | Status: SHIPPED | OUTPATIENT
Start: 2025-05-21

## 2025-05-21 NOTE — ASSESSMENT & PLAN NOTE
Patient's current weight reported as 188 lbs. Has lost 34 lbs. (15% of TBW) since starting therapy plan.  Current regimen includes: Wegovy 1mg once weekly   Rationale for plan: Patient is tolerating Wegovy 1mg dose well. No adverse effects reported. Due to indication for further weight loss and patient preference, plan to increase Wegovy to 1.7mg once weekly. Follow up in 4 weeks.     Medication Changes:  Increase: Wegovy from 1mg to 1.7mg once weekly

## 2025-05-22 ENCOUNTER — INFUSION (OUTPATIENT)
Dept: HEMATOLOGY/ONCOLOGY | Facility: CLINIC | Age: 40
End: 2025-05-22
Payer: COMMERCIAL

## 2025-05-22 ENCOUNTER — OFFICE VISIT (OUTPATIENT)
Dept: HEMATOLOGY/ONCOLOGY | Facility: CLINIC | Age: 40
End: 2025-05-22
Payer: COMMERCIAL

## 2025-05-22 VITALS
TEMPERATURE: 98.1 F | BODY MASS INDEX: 30.22 KG/M2 | WEIGHT: 190.48 LBS | OXYGEN SATURATION: 98 % | RESPIRATION RATE: 16 BRPM | SYSTOLIC BLOOD PRESSURE: 114 MMHG | HEART RATE: 86 BPM | DIASTOLIC BLOOD PRESSURE: 77 MMHG

## 2025-05-22 DIAGNOSIS — C50.411 MALIGNANT NEOPLASM OF UPPER-OUTER QUADRANT OF RIGHT BREAST IN FEMALE, ESTROGEN RECEPTOR POSITIVE: ICD-10-CM

## 2025-05-22 DIAGNOSIS — Z17.0 MALIGNANT NEOPLASM OF UPPER-OUTER QUADRANT OF RIGHT BREAST IN FEMALE, ESTROGEN RECEPTOR POSITIVE: ICD-10-CM

## 2025-05-22 DIAGNOSIS — Z17.0 MALIGNANT NEOPLASM OF UPPER-OUTER QUADRANT OF RIGHT BREAST IN FEMALE, ESTROGEN RECEPTOR POSITIVE: Primary | ICD-10-CM

## 2025-05-22 DIAGNOSIS — C50.411 MALIGNANT NEOPLASM OF UPPER-OUTER QUADRANT OF RIGHT BREAST IN FEMALE, ESTROGEN RECEPTOR POSITIVE: Primary | ICD-10-CM

## 2025-05-22 PROCEDURE — 96365 THER/PROPH/DIAG IV INF INIT: CPT | Mod: INF

## 2025-05-22 PROCEDURE — 1036F TOBACCO NON-USER: CPT

## 2025-05-22 PROCEDURE — 96402 CHEMO HORMON ANTINEOPL SQ/IM: CPT

## 2025-05-22 PROCEDURE — 99214 OFFICE O/P EST MOD 30 MIN: CPT

## 2025-05-22 PROCEDURE — 2500000004 HC RX 250 GENERAL PHARMACY W/ HCPCS (ALT 636 FOR OP/ED): Mod: JZ | Performed by: INTERNAL MEDICINE

## 2025-05-22 PROCEDURE — 99214 OFFICE O/P EST MOD 30 MIN: CPT | Mod: 25

## 2025-05-22 RX ORDER — FAMOTIDINE 10 MG/ML
20 INJECTION, SOLUTION INTRAVENOUS ONCE AS NEEDED
OUTPATIENT
Start: 2025-10-28

## 2025-05-22 RX ORDER — EPINEPHRINE 0.3 MG/.3ML
0.3 INJECTION SUBCUTANEOUS EVERY 5 MIN PRN
Status: DISCONTINUED | OUTPATIENT
Start: 2025-05-22 | End: 2025-05-22 | Stop reason: HOSPADM

## 2025-05-22 RX ORDER — ZOLEDRONIC ACID 0.04 MG/ML
4 INJECTION, SOLUTION INTRAVENOUS ONCE
OUTPATIENT
Start: 2025-10-28

## 2025-05-22 RX ORDER — FAMOTIDINE 10 MG/ML
20 INJECTION, SOLUTION INTRAVENOUS ONCE AS NEEDED
OUTPATIENT
Start: 2025-05-23

## 2025-05-22 RX ORDER — DIPHENHYDRAMINE HYDROCHLORIDE 50 MG/ML
50 INJECTION, SOLUTION INTRAMUSCULAR; INTRAVENOUS AS NEEDED
OUTPATIENT
Start: 2025-05-23

## 2025-05-22 RX ORDER — ALBUTEROL SULFATE 0.83 MG/ML
3 SOLUTION RESPIRATORY (INHALATION) AS NEEDED
OUTPATIENT
Start: 2025-05-23

## 2025-05-22 RX ORDER — EPINEPHRINE 0.3 MG/.3ML
0.3 INJECTION SUBCUTANEOUS EVERY 5 MIN PRN
OUTPATIENT
Start: 2025-05-23

## 2025-05-22 RX ORDER — ACETAMINOPHEN 325 MG/1
650 TABLET ORAL ONCE
Status: CANCELLED | OUTPATIENT
Start: 2025-05-22 | End: 2025-05-22

## 2025-05-22 RX ORDER — ALBUTEROL SULFATE 0.83 MG/ML
3 SOLUTION RESPIRATORY (INHALATION) AS NEEDED
Status: DISCONTINUED | OUTPATIENT
Start: 2025-05-22 | End: 2025-05-22 | Stop reason: HOSPADM

## 2025-05-22 RX ORDER — HEPARIN 100 UNIT/ML
500 SYRINGE INTRAVENOUS AS NEEDED
OUTPATIENT
Start: 2025-05-22

## 2025-05-22 RX ORDER — DIPHENHYDRAMINE HYDROCHLORIDE 50 MG/ML
50 INJECTION, SOLUTION INTRAMUSCULAR; INTRAVENOUS AS NEEDED
Status: DISCONTINUED | OUTPATIENT
Start: 2025-05-22 | End: 2025-05-22 | Stop reason: HOSPADM

## 2025-05-22 RX ORDER — FAMOTIDINE 10 MG/ML
20 INJECTION, SOLUTION INTRAVENOUS ONCE AS NEEDED
Status: DISCONTINUED | OUTPATIENT
Start: 2025-05-22 | End: 2025-05-22 | Stop reason: HOSPADM

## 2025-05-22 RX ORDER — DIPHENHYDRAMINE HYDROCHLORIDE 50 MG/ML
50 INJECTION, SOLUTION INTRAMUSCULAR; INTRAVENOUS AS NEEDED
OUTPATIENT
Start: 2025-10-28

## 2025-05-22 RX ORDER — ACETAMINOPHEN 325 MG/1
650 TABLET ORAL ONCE
OUTPATIENT
Start: 2025-11-07 | End: 2025-11-07

## 2025-05-22 RX ORDER — ALBUTEROL SULFATE 0.83 MG/ML
3 SOLUTION RESPIRATORY (INHALATION) AS NEEDED
OUTPATIENT
Start: 2025-10-28

## 2025-05-22 RX ORDER — ZOLEDRONIC ACID 0.04 MG/ML
4 INJECTION, SOLUTION INTRAVENOUS ONCE
Status: COMPLETED | OUTPATIENT
Start: 2025-05-22 | End: 2025-05-22

## 2025-05-22 RX ORDER — EPINEPHRINE 0.3 MG/.3ML
0.3 INJECTION SUBCUTANEOUS EVERY 5 MIN PRN
OUTPATIENT
Start: 2025-10-28

## 2025-05-22 RX ORDER — HEPARIN SODIUM,PORCINE/PF 10 UNIT/ML
50 SYRINGE (ML) INTRAVENOUS AS NEEDED
OUTPATIENT
Start: 2025-05-22

## 2025-05-22 RX ORDER — ACETAMINOPHEN 325 MG/1
650 TABLET ORAL ONCE
Status: DISCONTINUED | OUTPATIENT
Start: 2025-05-22 | End: 2025-05-22 | Stop reason: HOSPADM

## 2025-05-22 RX ADMIN — ZOLEDRONIC ACID 4 MG: 0.04 INJECTION, SOLUTION INTRAVENOUS at 15:56

## 2025-05-22 RX ADMIN — SODIUM CHLORIDE 500 ML: 9 INJECTION, SOLUTION INTRAVENOUS at 15:56

## 2025-05-22 RX ADMIN — LEUPROLIDE ACETATE 3.75 MG: KIT at 16:02

## 2025-05-22 ASSESSMENT — ENCOUNTER SYMPTOMS
COUGH: 0
CHILLS: 0
NAUSEA: 0
DIARRHEA: 0
DIAPHORESIS: 0
HEMATOLOGIC/LYMPHATIC NEGATIVE: 1
RESPIRATORY NEGATIVE: 1
UNEXPECTED WEIGHT CHANGE: 0
VOMITING: 0
NEUROLOGICAL NEGATIVE: 1
FATIGUE: 0
HOT FLASHES: 1
CARDIOVASCULAR NEGATIVE: 1
CONSTIPATION: 0
SHORTNESS OF BREATH: 0
FEVER: 0
APPETITE CHANGE: 0
ABDOMINAL DISTENTION: 0
PSYCHIATRIC NEGATIVE: 1
ARTHRALGIAS: 1

## 2025-05-22 ASSESSMENT — PAIN SCALES - GENERAL: PAINLEVEL_OUTOF10: 0-NO PAIN

## 2025-05-23 ENCOUNTER — APPOINTMENT (OUTPATIENT)
Dept: HEMATOLOGY/ONCOLOGY | Facility: CLINIC | Age: 40
End: 2025-05-23
Payer: COMMERCIAL

## 2025-05-23 DIAGNOSIS — C50.411 MALIGNANT NEOPLASM OF UPPER-OUTER QUADRANT OF RIGHT BREAST IN FEMALE, ESTROGEN RECEPTOR POSITIVE: Primary | ICD-10-CM

## 2025-05-23 DIAGNOSIS — Z17.0 MALIGNANT NEOPLASM OF UPPER-OUTER QUADRANT OF RIGHT BREAST IN FEMALE, ESTROGEN RECEPTOR POSITIVE: Primary | ICD-10-CM

## 2025-05-27 ENCOUNTER — PHARMACY VISIT (OUTPATIENT)
Dept: PHARMACY | Facility: CLINIC | Age: 40
End: 2025-05-27
Payer: COMMERCIAL

## 2025-06-09 ENCOUNTER — APPOINTMENT (OUTPATIENT)
Dept: PRIMARY CARE | Facility: CLINIC | Age: 40
End: 2025-06-09
Payer: COMMERCIAL

## 2025-06-13 NOTE — PROGRESS NOTES
"  Clinical Pharmacy Appointment    Patient ID: Luz Rojo \"Alfonzo" is a 39 y.o. female who presents for Obesity.    Pt is here for Follow Up appointment.   Appointment completed by: Alice  Referring Provider: Richa Naranjo MD  PCP: Richa Naranjo MD, last visit: 9/11/24, next visit: 8/18/25    Subjective   HPI  PMH significant for obesity, anxiety, depression, HLD, hypothyroidism.  Special needs/barriers to therapy: None identified    Medication System Management  Patients preferred pharmacy: [facility]  Adherence/Organization: No current concerns  Affordability/Accessibility: No current concerns    Drug Interactions  No relevant drug interactions were noted.    WEIGHT LOSS:   BMI Readings from Last 1 Encounters:   05/22/25 30.22 kg/m²   Starting weight: 222 lbs. (9/11/24)  Previous weight: 188 lbs. (5/21/25)  Current weight: 184 lbs.    Patient Reported Goal Weight: 145 lbs    Pharmacological Therapy  Current Medications: Wegovy 1.7mg once weekly (Wednesdays)   Previous Medications: none     Clarifications to above regimen: none  Adverse Effects: constipation    Preventative Care  Immunizations Needed: All up-to-date and documented  Tobacco Use: non-smoker    Objective   Allergies[1]  Social History     Social History Narrative    Not on file      Medication Review  Current Outpatient Medications   Medication Instructions    anastrozole (ARIMIDEX) 1 mg, oral, Daily, Swallow whole with a drink of water.    calcium carbonate (CALCIUM 500 ORAL) Take by mouth. 2tabs or 3 tabs    cholecalciferol (VITAMIN D3) 5,000 Units, Daily    levothyroxine (SYNTHROID, LEVOXYL) 112 mcg, oral, Daily, Take on an empty stomach at the same time each day, either 30 to 60 minutes prior to breakfast    LORazepam (ATIVAN) 0.5 mg, oral, Every 6 hours PRN    omega-3 acid ethyl esters (LOVAZA) 1 g, oral, Daily    sertraline (ZOLOFT) 100 mg, oral, Daily    Wegovy 1.7 mg, subcutaneous, Every 7 days      Vitals  BP Readings from " "Last 2 Encounters:   05/22/25 114/77   04/25/25 (!) 118/91     Labs  A1C  Lab Results   Component Value Date    HGBA1C 5.7 (H) 11/05/2024    HGBA1C 5.2 01/05/2024     BMP  Lab Results   Component Value Date    CALCIUM 9.6 05/20/2025     05/20/2025    K 4.0 05/20/2025    CO2 27 05/20/2025     05/20/2025    BUN 19 05/20/2025    CREATININE 0.88 05/20/2025    EGFR 85 05/20/2025     LFTs  Lab Results   Component Value Date    ALT 16 05/20/2025    AST 17 05/20/2025    ALKPHOS 64 05/20/2025    BILITOT 0.6 05/20/2025     FLP  Lab Results   Component Value Date    TRIG 158 (H) 05/20/2025    CHOL 209 (H) 05/20/2025    LDLF 122 (H) 03/04/2022    LDLCALC 141 (H) 05/20/2025    HDL 36.2 05/20/2025     Urine Microalbumin  No results found for: \"MICROALBCREA\"  Weight Management  Wt Readings from Last 3 Encounters:   05/22/25 86.4 kg (190 lb 7.6 oz)   04/25/25 86.8 kg (191 lb 7.5 oz)   03/21/25 88.5 kg (195 lb)      There is no height or weight on file to calculate BMI.     Assessment/Plan   Problem List Items Addressed This Visit       Class 2 obesity    Patient's current weight reported as 184 lbs. Has lost 38 lbs. (17% of TBW) since starting therapy plan.  Current regimen includes: Wegovy 1.7mg once weekly  Rationale for plan: Patient reported occasional constipation with current dose of Wegovy, not intolerable. She is continuing to lose weight at an appropriate rate of 1 lb/ week. Plan to continue Wegovy 1.7mg dose at this time. Follow up in 6 weeks.     Medication Changes:  No Medication Changes            Patient Education:  Counseled patient on relevant medication mechanisms of action, expectations, side effects, duration of therapy, contraindications, administration, and monitoring parameters.  All questions and concerns addressed. Contact pharmacist with any further questions or concerns prior to next appointment.    Clinical Pharmacist follow-up: 7/30/25 1:00PM, Telehealth visit    Thank You,  Charlene " Luiz Olmos  PGY-1 Pharmacy Resident    Continue all meds under the continuation of care with the referring provider and clinical pharmacy team.  Verbal consent to manage patient's drug therapy was obtained from the patient. They were informed they may decline to participate or withdraw from participation in pharmacy services at any time.         [1] No Known Allergies

## 2025-06-18 ENCOUNTER — APPOINTMENT (OUTPATIENT)
Dept: PHARMACY | Facility: HOSPITAL | Age: 40
End: 2025-06-18
Payer: COMMERCIAL

## 2025-06-18 DIAGNOSIS — E66.812 CLASS 2 OBESITY: ICD-10-CM

## 2025-06-18 PROCEDURE — RXMED WILLOW AMBULATORY MEDICATION CHARGE

## 2025-06-18 NOTE — ASSESSMENT & PLAN NOTE
Patient's current weight reported as 184 lbs. Has lost 38 lbs. (17% of TBW) since starting therapy plan.  Current regimen includes: Wegovy 1.7mg once weekly  Rationale for plan: Patient reported occasional constipation with current dose of Wegovy, not intolerable. She is continuing to lose weight at an appropriate rate of 1 lb/ week. Plan to continue Wegovy 1.7mg dose at this time. Follow up in 6 weeks.     Medication Changes:  No Medication Changes

## 2025-06-19 ENCOUNTER — INFUSION (OUTPATIENT)
Dept: HEMATOLOGY/ONCOLOGY | Facility: CLINIC | Age: 40
End: 2025-06-19
Payer: COMMERCIAL

## 2025-06-19 VITALS
DIASTOLIC BLOOD PRESSURE: 88 MMHG | OXYGEN SATURATION: 98 % | WEIGHT: 186.51 LBS | BODY MASS INDEX: 29.59 KG/M2 | SYSTOLIC BLOOD PRESSURE: 131 MMHG | HEART RATE: 67 BPM | RESPIRATION RATE: 16 BRPM | TEMPERATURE: 97.3 F

## 2025-06-19 DIAGNOSIS — Z17.0 MALIGNANT NEOPLASM OF UPPER-OUTER QUADRANT OF RIGHT BREAST IN FEMALE, ESTROGEN RECEPTOR POSITIVE: ICD-10-CM

## 2025-06-19 DIAGNOSIS — C50.411 MALIGNANT NEOPLASM OF UPPER-OUTER QUADRANT OF RIGHT BREAST IN FEMALE, ESTROGEN RECEPTOR POSITIVE: ICD-10-CM

## 2025-06-19 PROCEDURE — 2500000004 HC RX 250 GENERAL PHARMACY W/ HCPCS (ALT 636 FOR OP/ED): Mod: JZ

## 2025-06-19 PROCEDURE — 96402 CHEMO HORMON ANTINEOPL SQ/IM: CPT

## 2025-06-19 RX ORDER — ALBUTEROL SULFATE 0.83 MG/ML
3 SOLUTION RESPIRATORY (INHALATION) AS NEEDED
OUTPATIENT
Start: 2025-07-17

## 2025-06-19 RX ORDER — EPINEPHRINE 0.3 MG/.3ML
0.3 INJECTION SUBCUTANEOUS EVERY 5 MIN PRN
OUTPATIENT
Start: 2025-07-17

## 2025-06-19 RX ORDER — DIPHENHYDRAMINE HYDROCHLORIDE 50 MG/ML
50 INJECTION, SOLUTION INTRAMUSCULAR; INTRAVENOUS AS NEEDED
OUTPATIENT
Start: 2025-07-17

## 2025-06-19 RX ORDER — FAMOTIDINE 10 MG/ML
20 INJECTION, SOLUTION INTRAVENOUS ONCE AS NEEDED
OUTPATIENT
Start: 2025-07-17

## 2025-06-19 RX ADMIN — LEUPROLIDE ACETATE 3.75 MG: KIT at 16:09

## 2025-06-19 ASSESSMENT — PAIN SCALES - GENERAL: PAINLEVEL_OUTOF10: 0-NO PAIN

## 2025-06-28 ENCOUNTER — PHARMACY VISIT (OUTPATIENT)
Dept: PHARMACY | Facility: CLINIC | Age: 40
End: 2025-06-28
Payer: COMMERCIAL

## 2025-07-16 ENCOUNTER — INFUSION (OUTPATIENT)
Dept: HEMATOLOGY/ONCOLOGY | Facility: CLINIC | Age: 40
End: 2025-07-16
Payer: COMMERCIAL

## 2025-07-16 VITALS
HEART RATE: 63 BPM | DIASTOLIC BLOOD PRESSURE: 77 MMHG | BODY MASS INDEX: 28.85 KG/M2 | TEMPERATURE: 97 F | OXYGEN SATURATION: 98 % | SYSTOLIC BLOOD PRESSURE: 115 MMHG | WEIGHT: 181.88 LBS | RESPIRATION RATE: 17 BRPM

## 2025-07-16 DIAGNOSIS — C50.411 MALIGNANT NEOPLASM OF UPPER-OUTER QUADRANT OF RIGHT BREAST IN FEMALE, ESTROGEN RECEPTOR POSITIVE: ICD-10-CM

## 2025-07-16 DIAGNOSIS — Z17.0 MALIGNANT NEOPLASM OF UPPER-OUTER QUADRANT OF RIGHT BREAST IN FEMALE, ESTROGEN RECEPTOR POSITIVE: ICD-10-CM

## 2025-07-16 PROCEDURE — 2500000004 HC RX 250 GENERAL PHARMACY W/ HCPCS (ALT 636 FOR OP/ED): Mod: JZ

## 2025-07-16 PROCEDURE — 96402 CHEMO HORMON ANTINEOPL SQ/IM: CPT

## 2025-07-16 RX ORDER — ALBUTEROL SULFATE 0.83 MG/ML
3 SOLUTION RESPIRATORY (INHALATION) AS NEEDED
OUTPATIENT
Start: 2025-07-17

## 2025-07-16 RX ORDER — DIPHENHYDRAMINE HYDROCHLORIDE 50 MG/ML
50 INJECTION, SOLUTION INTRAMUSCULAR; INTRAVENOUS AS NEEDED
Status: DISCONTINUED | OUTPATIENT
Start: 2025-07-16 | End: 2025-07-16 | Stop reason: HOSPADM

## 2025-07-16 RX ORDER — DIPHENHYDRAMINE HYDROCHLORIDE 50 MG/ML
50 INJECTION, SOLUTION INTRAMUSCULAR; INTRAVENOUS AS NEEDED
OUTPATIENT
Start: 2025-07-17

## 2025-07-16 RX ORDER — EPINEPHRINE 0.3 MG/.3ML
0.3 INJECTION SUBCUTANEOUS EVERY 5 MIN PRN
OUTPATIENT
Start: 2025-07-17

## 2025-07-16 RX ORDER — EPINEPHRINE 0.3 MG/.3ML
0.3 INJECTION SUBCUTANEOUS EVERY 5 MIN PRN
Status: DISCONTINUED | OUTPATIENT
Start: 2025-07-16 | End: 2025-07-16 | Stop reason: HOSPADM

## 2025-07-16 RX ORDER — FAMOTIDINE 10 MG/ML
20 INJECTION, SOLUTION INTRAVENOUS ONCE AS NEEDED
Status: DISCONTINUED | OUTPATIENT
Start: 2025-07-16 | End: 2025-07-16 | Stop reason: HOSPADM

## 2025-07-16 RX ORDER — FAMOTIDINE 10 MG/ML
20 INJECTION, SOLUTION INTRAVENOUS ONCE AS NEEDED
OUTPATIENT
Start: 2025-07-17

## 2025-07-16 RX ORDER — ALBUTEROL SULFATE 0.83 MG/ML
3 SOLUTION RESPIRATORY (INHALATION) AS NEEDED
Status: DISCONTINUED | OUTPATIENT
Start: 2025-07-16 | End: 2025-07-16 | Stop reason: HOSPADM

## 2025-07-16 RX ADMIN — LEUPROLIDE ACETATE 3.75 MG: KIT at 16:06

## 2025-07-16 ASSESSMENT — PAIN SCALES - GENERAL: PAINLEVEL_OUTOF10: 0-NO PAIN

## 2025-07-17 ENCOUNTER — APPOINTMENT (OUTPATIENT)
Dept: HEMATOLOGY/ONCOLOGY | Facility: CLINIC | Age: 40
End: 2025-07-17
Payer: COMMERCIAL

## 2025-07-17 NOTE — PROGRESS NOTES
"Patient ID: Alice Rojo is a 39 y.o. female.     Cancer Staging   Malignant neoplasm of upper-outer quadrant of right breast in female, estrogen receptor positive  Staging form: Breast, AJCC 8th Edition  - Clinical: Stage IB (cT1c, cN1, cM0, G2, ER+, ME+, HER2-) - Signed by Liliana Barrett DO on 2/6/2024  - Pathologic: Stage IB (pT1c, pN1(sn), cM0, G3, ER+, ME+, HER2-) - Signed by Liliana Barrett DO on 4/9/2024    Oncology History   Malignant neoplasm of upper-outer quadrant of right breast in female, estrogen receptor positive   2/6/2024 Initial Diagnosis    Malignant neoplasm of upper-outer quadrant of right breast in female, estrogen receptor positive (CMS/HCC)     2/6/2024 Cancer Staged    Staging form: Breast, AJCC 8th Edition, Clinical: Stage IB (cT1c, cN1, cM0, G2, ER+, ME+, HER2-) - Signed by Liliana Barrett DO on 2/6/2024 4/9/2024 Cancer Staged    Staging form: Breast, AJCC 8th Edition, Pathologic: Stage IB (pT1c, pN1(sn), cM0, G3, ER+, ME+, HER2-) - Signed by Liliana Barrett DO on 4/9/2024         Subjective    HPI  A virtual visit (audio & video) between the patient at home and the provider at [facility] was utilized to provide this telehealth service    Ms. Luz Rojo \"Alfonzo" is a 39 y.o. female presenting for follow-up for breast cancer, here to discuss the role of adjuvant Kisqali.     She has been doing well, notes she does have some reservations about frequent blood work.    Patient's past medical history, surgical history, family history and social history reviewed.    Review of Systems:   Review of Systems:    Positive per HPI, otherwise negative.       Objective    There were no vitals filed for this visit.      Physical Exam  (Virtual visit)    Performance Status:  Symptomatic; fully ambulatory    Labs/Imaging/Pathology: personally reviewed reports and images in Epic electronic medical record system. Pertinent results as it related to the plan represented " in below in assessment and plan.       Assessment/Plan   1.Right breast IDC, stage IB ypT1c (20mm)pN1 (2/6LNs) M0, ER 60-70%, DC 80-90% , HER 2 IHC 1+  - initially diagnosed after abnormal mammogram first noted 1/24/2024 at [facility]  - Follow-up diagnostic imaging on 1/26/2024 reveals a spiculated mass at 10:30 10cmFN measuring 2cm on imaging.  There are also 2 abnormal axillary lymph nodes  - 1/30/2024 core bx of right breast mass path and LN revealed IDC grade 2 and axillary node bx same day revealed metastatic breast carciboma. ER 60-70%, DC 80-90% , HER 2 IHC 1+  - staging scans with CT c/a/p and bone scan 2/6/24 were negative.  - 3/22/24 right breast partial mastectomy path revealed 20 mm focus of invasive cancer, G3, along with Grade 3 DCIS. Margins negative. 2 out of 6 lymph nodes positive. Final staging pT1c pN1a.     - Today we discussed the role of adjuvant chemotherapy given higher clinical risk factors with size of tumor and node positive disease in setting of premenopausal state  - We did discuss there is a role for anthracycline-based therapy given her premenopausal status and node positive disease putting her at higher risk per the ABC trials (Rashawn, et al JCO 2017) we discussed with hormone positive being lower risk,  the benefit from anthracyclines compared to taxotere plus cytoxan is less clear and after reviewing side effects from both, patient is hesitant about the potential side effects from anthracycline especially as she has hx of hyperemesis along with concern about secondary leukemia  - we discussed Taxotere plus cytoxan is a very reasonable alternative  - We also reviewed that much of the benefit of chemotherapy comes from the ovarian suppression affect and she is open to ovarian suppression long-term.  - 4/22/24 C1D1  Taxotere plus cytoxan included Emend pre-med in addition to dexamethasone 3 days post and the night before due to her concern for hyperemesis (as she had with pregnancy)  - cooling  cap      5/13/24:  - overall tolerated C1 very well with minimal toxicity, reports minimal nausea  - does have some trouble with sleep and will decrease dex to daily after treatment to see if still manageable  - changes in dose or premeds today  - No contraindications to proceed with cycle 2  - Will continue Ativan for anxiety as premed  - Patient is tolerating treatment   - We discussed that there will be no dose adjustment at this time   - Patient would like to cancel day 8 visits and call the office PRN.   - Patient has no other major complaints at this time   - RTC in 3 weeks for cycle 3        6/3/24:   - Patient overall tolerated cycle 2 very well, her main complaint was constipation.   - Discuss with her and she can up titrate Miralax.  - Will also send a prescription for Lactulose to use if severe constipation.  - No neuropathy, no other GI complaints.  - No contraindications to proceed with cycle 3 today.  - Labs reviewed, slight elevation in ALT is likely from the chemotherapy and we will monitor.  - RTC in three weeks for cycle 4     6/24/24:   - Tolerated last cycle fairly well without any increased toxicity.  - She did have some days of fatigue  - no dose adjustments today  - No contraindications to proceed with cycle 4  - We did discuss that at this point she will proceed to radiation simulation tomorrow  - I will send a prescription for Arimidex that she can start 2 weeks after finishing radiation.  - She will start Lupron in two months when I see her and we will plan for ovarian suppression.   - She did not need any refills today.   - RTC with me in 2 months     9/6/24:  - Patient tolerated RT well with some minor burn but the skin is healing now.   - She will receive Lupron today.  - She will start anastrozole which she already has at home.  - We will plan for Zometa in 2 months.  - We again reviewed the benefits of endocrine therapy.  - RTC with me in 2 months, she will see Dr. Barrett in January,  and can see Ashok in May.    11/8/24:  - No evidence of recurrence on exam today.  - She is tolerating anastrozole weill with no major issues.   - We again discussed the role of Zometa and she will plan to get first dose when she returns for her next Lupron on 11/25/24.  - She is planned to see Dr. Barrett in January and I will see her with her next Zometa after that in March.    7/18/25: Virtual visit   - Today we discussed the MONICA trial and reviewed that those who were included were stage II and III. Stage IIA was included if they had high genomic assay or node involvement   - Given she is stage I, we discussed there is no clear benefit   - At this point, she would like to think about it and so some further research   - We discussed we could do genomic assay to help further risk stratify   - RTC in November as previously scheduled         Reviewed ongoing medical problems and how they relate to her breast cancer, will continue long term monitoring.       RTC in November  This note has been transcribed using a medical scribe and there is a possibility of unintentional typing misprints    Diagnoses and all orders for this visit:  Malignant neoplasm of upper-outer quadrant of right breast in female, estrogen receptor positive      Ban Gallegos MD  Hematology/Oncology  [facility]      Scribe Attestation  By signing my name below, Vanessa GARCIA Scribe, attest that this documentation has been prepared under the direction and in the presence of Ban Gallegos MD.    Provider Attestation  Ban GARCIA MD, personally performed the services described in the documentation as scribed by Vanessa Kirk, in my presence, and confirm it is both accurate and complete.

## 2025-07-18 ENCOUNTER — TELEMEDICINE (OUTPATIENT)
Dept: HEMATOLOGY/ONCOLOGY | Facility: CLINIC | Age: 40
End: 2025-07-18
Payer: COMMERCIAL

## 2025-07-18 DIAGNOSIS — Z17.0 MALIGNANT NEOPLASM OF UPPER-OUTER QUADRANT OF RIGHT BREAST IN FEMALE, ESTROGEN RECEPTOR POSITIVE: Primary | ICD-10-CM

## 2025-07-18 DIAGNOSIS — C50.411 MALIGNANT NEOPLASM OF UPPER-OUTER QUADRANT OF RIGHT BREAST IN FEMALE, ESTROGEN RECEPTOR POSITIVE: Primary | ICD-10-CM

## 2025-07-18 PROCEDURE — 99213 OFFICE O/P EST LOW 20 MIN: CPT | Performed by: INTERNAL MEDICINE

## 2025-07-19 PROCEDURE — RXMED WILLOW AMBULATORY MEDICATION CHARGE

## 2025-07-21 ENCOUNTER — APPOINTMENT (OUTPATIENT)
Dept: SURGICAL ONCOLOGY | Facility: HOSPITAL | Age: 40
End: 2025-07-21
Payer: COMMERCIAL

## 2025-07-25 DIAGNOSIS — F32.0 DEPRESSION, MAJOR, SINGLE EPISODE, MILD: ICD-10-CM

## 2025-07-25 RX ORDER — SERTRALINE HYDROCHLORIDE 100 MG/1
100 TABLET, FILM COATED ORAL DAILY
Qty: 90 TABLET | Refills: 0 | Status: SHIPPED | OUTPATIENT
Start: 2025-07-25

## 2025-07-26 ENCOUNTER — PHARMACY VISIT (OUTPATIENT)
Dept: PHARMACY | Facility: CLINIC | Age: 40
End: 2025-07-26
Payer: COMMERCIAL

## 2025-07-26 PROCEDURE — RXMED WILLOW AMBULATORY MEDICATION CHARGE

## 2025-07-28 NOTE — PROGRESS NOTES
"  Clinical Pharmacy Appointment    Patient ID: Luz Rojo \"Alfonzo" is a 39 y.o. female who presents for Obesity.    Pt is here for Follow Up appointment.   Appointment completed by: Alice  Referring Provider: Richa Naranjo MD  PCP: Richa Naranjo MD, last visit: 9/11/24, next visit: 8/18/25     Subjective   HPI  PMH significant for obesity, anxiety, depression, HLD, hypothyroidism.  Special needs/barriers to therapy: None identified    Medication System Management  Patients preferred pharmacy: [facility]   Adherence/Organization: No current concerns  Affordability/Accessibility: No current concerns    Drug Interactions  No relevant drug interactions were noted.    WEIGHT LOSS:   BMI Readings from Last 1 Encounters:   07/16/25 28.85 kg/m²   Starting weight: 222 lbs. (9/11/24)  Previous weight: 184 lbs. (6/18/25)  Current weight: 179 lbs.    Patient Reported Goal Weight: 145 lbs    Pharmacological Therapy  Current Medications: Wegovy 1.7mg once weekly (Sundays)   Previous Medications: none     Clarifications to above regimen: none  Adverse Effects: stomach upset     Preventative Care  Immunizations Needed: All up-to-date and documented  Tobacco Use: non-smoker    Objective   Allergies[1]  Social History     Social History Narrative    Not on file      Medication Review  Current Outpatient Medications   Medication Instructions    anastrozole (ARIMIDEX) 1 mg, oral, Daily, Swallow whole with a drink of water.    calcium carbonate (CALCIUM 500 ORAL) Take by mouth. 2tabs or 3 tabs    cholecalciferol (VITAMIN D3) 5,000 Units, Daily    levothyroxine (SYNTHROID, LEVOXYL) 112 mcg, oral, Daily, Take on an empty stomach at the same time each day, either 30 to 60 minutes prior to breakfast    LORazepam (ATIVAN) 0.5 mg, oral, Every 6 hours PRN    omega-3 acid ethyl esters (LOVAZA) 1 g, oral, Daily    sertraline (ZOLOFT) 100 mg, oral, Daily    Wegovy 1.7 mg, subcutaneous, Every 7 days      Vitals  BP Readings from " "Last 2 Encounters:   07/16/25 115/77   06/19/25 131/88     Labs  A1C  Lab Results   Component Value Date    HGBA1C 5.7 (H) 11/05/2024    HGBA1C 5.2 01/05/2024     BMP  Lab Results   Component Value Date    CALCIUM 9.6 05/20/2025     05/20/2025    K 4.0 05/20/2025    CO2 27 05/20/2025     05/20/2025    BUN 19 05/20/2025    CREATININE 0.88 05/20/2025    EGFR 85 05/20/2025     LFTs  Lab Results   Component Value Date    ALT 16 05/20/2025    AST 17 05/20/2025    ALKPHOS 64 05/20/2025    BILITOT 0.6 05/20/2025     FLP  Lab Results   Component Value Date    TRIG 158 (H) 05/20/2025    CHOL 209 (H) 05/20/2025    LDLF 122 (H) 03/04/2022    LDLCALC 141 (H) 05/20/2025    HDL 36.2 05/20/2025     Urine Microalbumin  No results found for: \"MICROALBCREA\"  Weight Management  Wt Readings from Last 3 Encounters:   07/16/25 82.5 kg (181 lb 14.1 oz)   06/19/25 84.6 kg (186 lb 8.2 oz)   05/22/25 86.4 kg (190 lb 7.6 oz)     There is no height or weight on file to calculate BMI.     Assessment/Plan   Problem List Items Addressed This Visit       Class 2 obesity    Patient's current weight reported as 179 lbs. Has lost 43 lbs. (19% of TBW) since starting therapy plan.  Current regimen includes: Wegovy 1.7mg once weekly  Rationale for plan: Patient is continuing to lose weight at an appropriate rate with current dose of Wegovy. She experienced some stomach upset (now resolved) in the past month, likely due to eating out/ changes in diet. Counseled patient on trying to limit/ avoid fried, fatty, greasy foods to prevent GI upset in the future. [place] to continue Wegovy 1.7mg dose at this time. Follow up in 2 months.     Medication Changes:  No Medication Changes          Relevant Medications    semaglutide, weight loss, (Wegovy) 1.7 mg/0.75 mL pen injector    Other Relevant Orders    Referral to Clinical Pharmacy       Patient Education:  Counseled patient on relevant medication mechanisms of action, expectations, side effects, " duration of therapy, contraindications, administration, and monitoring parameters.  All questions and concerns addressed. Contact pharmacist with any further questions or concerns prior to next appointment.    Clinical Pharmacist follow-up: 9/24/25 1:20PM, Telehealth visit    Thank You,  Charlene Olmos, PharmD  Clinical Pharmacist  [phone]    Continue all meds under the continuation of care with the referring provider and clinical pharmacy team.  Verbal consent to manage patient's drug therapy was obtained from the patient. They were informed they may decline to participate or withdraw from participation in pharmacy services at any time.         [1] No Known Allergies

## 2025-07-30 ENCOUNTER — APPOINTMENT (OUTPATIENT)
Dept: PHARMACY | Facility: HOSPITAL | Age: 40
End: 2025-07-30
Payer: COMMERCIAL

## 2025-07-30 DIAGNOSIS — E66.812 CLASS 2 OBESITY: ICD-10-CM

## 2025-07-30 RX ORDER — SEMAGLUTIDE 1.7 MG/.75ML
1.7 INJECTION, SOLUTION SUBCUTANEOUS
Qty: 3 ML | Refills: 2 | Status: SHIPPED | OUTPATIENT
Start: 2025-07-30

## 2025-07-30 NOTE — ASSESSMENT & PLAN NOTE
Patient's current weight reported as 179 lbs. Has lost 43 lbs. (19% of TBW) since starting therapy plan.  Current regimen includes: Wegovy 1.7mg once weekly  Rationale for plan: Patient is continuing to lose weight at an appropriate rate with current dose of Wegovy. She experienced some stomach upset (now resolved) in the past month, likely due to eating out/ changes in diet. Counseled patient on trying to limit/ avoid fried, fatty, greasy foods to prevent GI upset in the future. [place] to continue Wegovy 1.7mg dose at this time. Follow up in 2 months.     Medication Changes:  No Medication Changes

## 2025-08-01 ENCOUNTER — OFFICE VISIT (OUTPATIENT)
Dept: SURGICAL ONCOLOGY | Facility: HOSPITAL | Age: 40
End: 2025-08-01
Payer: COMMERCIAL

## 2025-08-01 VITALS
SYSTOLIC BLOOD PRESSURE: 125 MMHG | HEART RATE: 89 BPM | HEIGHT: 66 IN | BODY MASS INDEX: 28.61 KG/M2 | DIASTOLIC BLOOD PRESSURE: 81 MMHG | WEIGHT: 178 LBS | RESPIRATION RATE: 16 BRPM

## 2025-08-01 DIAGNOSIS — C50.411 MALIGNANT NEOPLASM OF UPPER-OUTER QUADRANT OF RIGHT BREAST IN FEMALE, ESTROGEN RECEPTOR POSITIVE: ICD-10-CM

## 2025-08-01 DIAGNOSIS — Z17.0 MALIGNANT NEOPLASM OF UPPER-OUTER QUADRANT OF RIGHT BREAST IN FEMALE, ESTROGEN RECEPTOR POSITIVE: ICD-10-CM

## 2025-08-01 DIAGNOSIS — Z85.3 ENCOUNTER FOR FOLLOW-UP SURVEILLANCE OF BREAST CANCER: Primary | ICD-10-CM

## 2025-08-01 DIAGNOSIS — Z79.811 USE OF ANASTROZOLE (ARIMIDEX): ICD-10-CM

## 2025-08-01 DIAGNOSIS — Z08 ENCOUNTER FOR FOLLOW-UP SURVEILLANCE OF BREAST CANCER: Primary | ICD-10-CM

## 2025-08-01 PROCEDURE — 3008F BODY MASS INDEX DOCD: CPT | Performed by: NURSE PRACTITIONER

## 2025-08-01 PROCEDURE — 99213 OFFICE O/P EST LOW 20 MIN: CPT | Performed by: NURSE PRACTITIONER

## 2025-08-01 RX ORDER — ANASTROZOLE 1 MG/1
1 TABLET ORAL DAILY
Qty: 30 TABLET | Refills: 4 | Status: SHIPPED | OUTPATIENT
Start: 2025-08-01

## 2025-08-04 ENCOUNTER — APPOINTMENT (OUTPATIENT)
Dept: SURGICAL ONCOLOGY | Facility: HOSPITAL | Age: 40
End: 2025-08-04
Payer: COMMERCIAL

## 2025-08-10 PROBLEM — Z79.811 USE OF ANASTROZOLE (ARIMIDEX): Status: ACTIVE | Noted: 2025-08-10

## 2025-08-10 PROBLEM — Z08 ENCOUNTER FOR FOLLOW-UP SURVEILLANCE OF BREAST CANCER: Status: ACTIVE | Noted: 2025-08-10

## 2025-08-10 PROBLEM — Z85.3 ENCOUNTER FOR FOLLOW-UP SURVEILLANCE OF BREAST CANCER: Status: ACTIVE | Noted: 2025-08-10

## 2025-08-10 ASSESSMENT — ENCOUNTER SYMPTOMS
GASTROINTESTINAL NEGATIVE: 1
MUSCULOSKELETAL NEGATIVE: 1
EYES NEGATIVE: 1
HEMATOLOGIC/LYMPHATIC NEGATIVE: 1
CARDIOVASCULAR NEGATIVE: 1
CONSTITUTIONAL NEGATIVE: 1
PSYCHIATRIC NEGATIVE: 1
RESPIRATORY NEGATIVE: 1
NEUROLOGICAL NEGATIVE: 1
ENDOCRINE NEGATIVE: 1

## 2025-08-10 NOTE — PROGRESS NOTES
"[facility]  Luz Rojo female   1985 39 y.o.  [id number]      Chief Complaint  Follow up clinical exam, history right breast cancer.    History Of Present Illness  Luz Rojo \"Alfonzo" is a very pleasant 39 y.o.  female s/p right mag seed partial mastectomy and sentinel lymph node biopsy () on 3/22/2024 for right breast invasive ductal carcinoma (IDC), grade 2, ER+95%, MT+95%, HER2-, measuring 2 cm with negative margins and grade 3 ductal carcinoma in situ (DCIS). She completed radiation therapy 8/15/2025. She completed adjuvant TC x 4 completed 2024, ovarian suppression and Anastrozole with Zometa. She has family history of breast cancer in her paternal grandmother, 40's. She presents today for clinical breast exam.     Cancer Staging   Malignant neoplasm of upper-outer quadrant of right breast in female, estrogen receptor positive  Staging form: Breast, AJCC 8th Edition  - Clinical: Stage IB (cT1c, cN1, cM0, G2, ER+, MT+, HER2-) - Signed by Liliana Barrett DO on 2024  - Pathologic: Stage IB (pT1c, pN1(sn), cM0, G3, ER+, MT+, HER2-) - Signed by Liliana Barrett DO on 2024       BREAST IMAGIN2025 Bilateral diagnostic mammogram, indicates BI-RADS Category 2.     REPRODUCTIVE HISTORY: menarche age 10, , first birth age 30, did not breastfeed, no OCP's, premenopausal, scattered fibroglandular tissue                                 FAMILY CANCER HISTORY:    Paternal Grandmother: Breast cancer, 40's  Maternal Grandfather: Colon cancer, 70's    Review of Systems  Review of Systems   Constitutional: Negative.    HENT:  Negative.     Eyes: Negative.    Respiratory: Negative.     Cardiovascular: Negative.    Gastrointestinal: Negative.    Endocrine: Negative.    Genitourinary: Negative.     Musculoskeletal: Negative.    Neurological: Negative.    Hematological: Negative.    Psychiatric/Behavioral: Negative.         Surgical History  She has a past surgical " history that includes  section, low transverse (2018); Breast lumpectomy (2024); and Breast biopsy (2024).    Family History  Cancer-related family history includes Breast cancer (age of onset: 40 - 49) in her paternal grandmother.     Social History  She reports that she has never smoked. She has never used smokeless tobacco. She reports that she does not currently use alcohol after a past usage of about 2.0 standard drinks of alcohol per week. She reports that she does not currently use drugs.    Allergies  Patient has no known allergies.    Medications  Current Outpatient Medications   Medication Instructions    anastrozole (ARIMIDEX) 1 mg, oral, Daily, Swallow whole with a drink of water.    calcium carbonate (CALCIUM 500 ORAL) Take by mouth. 2tabs or 3 tabs    cholecalciferol (VITAMIN D3) 5,000 Units, Daily    levothyroxine (SYNTHROID, LEVOXYL) 112 mcg, oral, Daily, Take on an empty stomach at the same time each day, either 30 to 60 minutes prior to breakfast    LORazepam (ATIVAN) 0.5 mg, oral, Every 6 hours PRN    omega-3 acid ethyl esters (LOVAZA) 1 g, oral, Daily    sertraline (ZOLOFT) 100 mg, oral, Daily    Wegovy 1.7 mg, subcutaneous, Every 7 days       Last Recorded Vitals  Vitals:    25 0859   BP: 125/81   Pulse: 89   Resp: 16       Physical Exam  Chest:       Patient is alert and oriented x3 and anxious. Her gait is steady and hand grasps are equal. Sclera is clear. The breasts are nearly symmetrical. The right breast has a well healed partial mastectomy incision. The breast tissue is soft without palpable abnormalities, discrete nodules or masses. The skin and nipples appear normal. There is no cervical, supraclavicular or axillary lymphadenopathy.       Visit Diagnosis  1. Encounter for follow-up surveillance of breast cancer        2. Malignant neoplasm of upper-outer quadrant of right breast in female, estrogen receptor positive        3. Use of anastrozole  (Arimidex)          Assessment/Plan  Breast cancer surveillance, normal clinical exam, right partial mastectomy, radiation, chemotherapy, Anastrozole, family history of breast cancer, scattered fibroglandular tissue    Plan:  Return January 2026 for bilateral diagnostic mammogram and office visit.     Patient Discussion/Summary  Your clinical examination is normal. Please return January 2026 for mammogram and office visit or sooner if you have any problems or concerns.       IMPORTANT INFORMATION REGARDING YOUR RESULTS    If you receive medical information from My [facility] Personal Health Record (online chart) your results will be released into your chart. This means you may view or see results of your biopsy or procedure before I contact you directly. If this occurs, please call the office and we will discuss your results over the phone.    You can see your health information, review clinical summaries from office visits & test results online when you follow your health with MY  Chart, a personal health record. To sign up go to [email or web address]. If you need assistance with signing up or trouble getting into your account call [employer] Patient Line 24/7 at [phone].    My office phone number is [phone]  if you need to get in touch with me or have additional questions or concerns. Thank you for choosing [facility] and trusting me as your healthcare provider. I look forward to seeing you again at your next office visit. I am honored to be a provider on your health care team and I remain dedicated to helping you achieve your health goals.       Anaid Dickerson, SIDDHARTHA-CNP

## 2025-08-13 ENCOUNTER — INFUSION (OUTPATIENT)
Dept: HEMATOLOGY/ONCOLOGY | Facility: CLINIC | Age: 40
End: 2025-08-13
Payer: COMMERCIAL

## 2025-08-13 VITALS
DIASTOLIC BLOOD PRESSURE: 71 MMHG | HEART RATE: 79 BPM | OXYGEN SATURATION: 98 % | TEMPERATURE: 96.8 F | WEIGHT: 178.57 LBS | BODY MASS INDEX: 28.82 KG/M2 | SYSTOLIC BLOOD PRESSURE: 103 MMHG | RESPIRATION RATE: 16 BRPM

## 2025-08-13 DIAGNOSIS — C50.411 MALIGNANT NEOPLASM OF UPPER-OUTER QUADRANT OF RIGHT BREAST IN FEMALE, ESTROGEN RECEPTOR POSITIVE: ICD-10-CM

## 2025-08-13 DIAGNOSIS — Z17.0 MALIGNANT NEOPLASM OF UPPER-OUTER QUADRANT OF RIGHT BREAST IN FEMALE, ESTROGEN RECEPTOR POSITIVE: ICD-10-CM

## 2025-08-13 PROCEDURE — 96402 CHEMO HORMON ANTINEOPL SQ/IM: CPT

## 2025-08-13 PROCEDURE — 2500000004 HC RX 250 GENERAL PHARMACY W/ HCPCS (ALT 636 FOR OP/ED): Mod: JZ

## 2025-08-13 RX ORDER — FAMOTIDINE 10 MG/ML
20 INJECTION, SOLUTION INTRAVENOUS ONCE AS NEEDED
OUTPATIENT
Start: 2025-09-10

## 2025-08-13 RX ORDER — EPINEPHRINE 0.3 MG/.3ML
0.3 INJECTION SUBCUTANEOUS EVERY 5 MIN PRN
OUTPATIENT
Start: 2025-09-10

## 2025-08-13 RX ORDER — ALBUTEROL SULFATE 0.83 MG/ML
3 SOLUTION RESPIRATORY (INHALATION) AS NEEDED
OUTPATIENT
Start: 2025-09-10

## 2025-08-13 RX ORDER — DIPHENHYDRAMINE HYDROCHLORIDE 50 MG/ML
50 INJECTION, SOLUTION INTRAMUSCULAR; INTRAVENOUS AS NEEDED
OUTPATIENT
Start: 2025-09-10

## 2025-08-13 RX ADMIN — LEUPROLIDE ACETATE 3.75 MG: KIT at 16:21

## 2025-08-13 ASSESSMENT — PAIN SCALES - GENERAL: PAINLEVEL_OUTOF10: 0-NO PAIN

## 2025-08-18 ENCOUNTER — APPOINTMENT (OUTPATIENT)
Dept: PRIMARY CARE | Facility: CLINIC | Age: 40
End: 2025-08-18
Payer: COMMERCIAL

## 2025-08-18 PROCEDURE — RXMED WILLOW AMBULATORY MEDICATION CHARGE

## 2025-08-21 ENCOUNTER — PHARMACY VISIT (OUTPATIENT)
Dept: PHARMACY | Facility: CLINIC | Age: 40
End: 2025-08-21
Payer: COMMERCIAL

## 2025-08-22 DIAGNOSIS — Z17.0 MALIGNANT NEOPLASM OF UPPER-OUTER QUADRANT OF RIGHT BREAST IN FEMALE, ESTROGEN RECEPTOR POSITIVE: Primary | ICD-10-CM

## 2025-08-22 DIAGNOSIS — C50.411 MALIGNANT NEOPLASM OF UPPER-OUTER QUADRANT OF RIGHT BREAST IN FEMALE, ESTROGEN RECEPTOR POSITIVE: Primary | ICD-10-CM

## 2025-08-29 ENCOUNTER — ALLIED HEALTH (OUTPATIENT)
Dept: INTEGRATIVE MEDICINE | Facility: CLINIC | Age: 40
End: 2025-08-29
Payer: COMMERCIAL

## 2025-08-29 DIAGNOSIS — M54.2 NECK PAIN: ICD-10-CM

## 2025-08-29 DIAGNOSIS — M54.59 CHRONIC PRIMARY LOW BACK PAIN: Primary | ICD-10-CM

## 2025-08-29 DIAGNOSIS — G89.29 CHRONIC PRIMARY LOW BACK PAIN: Primary | ICD-10-CM

## 2025-08-29 PROCEDURE — 97811 ACUP 1/> W/O ESTIM EA ADD 15: CPT

## 2025-08-29 PROCEDURE — 97810 ACUP 1/> WO ESTIM 1ST 15 MIN: CPT

## 2025-09-02 ENCOUNTER — HOSPITAL ENCOUNTER (OUTPATIENT)
Dept: RADIATION ONCOLOGY | Facility: CLINIC | Age: 40
Setting detail: RADIATION/ONCOLOGY SERIES
Discharge: HOME | End: 2025-09-02
Payer: COMMERCIAL

## 2025-09-02 VITALS
TEMPERATURE: 96.3 F | HEART RATE: 77 BPM | SYSTOLIC BLOOD PRESSURE: 137 MMHG | WEIGHT: 177.25 LBS | RESPIRATION RATE: 18 BRPM | OXYGEN SATURATION: 100 % | BODY MASS INDEX: 28.61 KG/M2 | DIASTOLIC BLOOD PRESSURE: 87 MMHG

## 2025-09-02 DIAGNOSIS — Z85.3 ENCOUNTER FOR FOLLOW-UP SURVEILLANCE OF BREAST CANCER: ICD-10-CM

## 2025-09-02 DIAGNOSIS — Z17.0 MALIGNANT NEOPLASM OF UPPER-OUTER QUADRANT OF RIGHT BREAST IN FEMALE, ESTROGEN RECEPTOR POSITIVE: Primary | ICD-10-CM

## 2025-09-02 DIAGNOSIS — Z08 ENCOUNTER FOR FOLLOW-UP SURVEILLANCE OF BREAST CANCER: ICD-10-CM

## 2025-09-02 DIAGNOSIS — C50.411 MALIGNANT NEOPLASM OF UPPER-OUTER QUADRANT OF RIGHT BREAST IN FEMALE, ESTROGEN RECEPTOR POSITIVE: Primary | ICD-10-CM

## 2025-09-02 DIAGNOSIS — Z79.811 USE OF ANASTROZOLE (ARIMIDEX): ICD-10-CM

## 2025-09-02 PROCEDURE — 99213 OFFICE O/P EST LOW 20 MIN: CPT | Performed by: NURSE PRACTITIONER

## 2025-09-02 ASSESSMENT — COLUMBIA-SUICIDE SEVERITY RATING SCALE - C-SSRS
6. HAVE YOU EVER DONE ANYTHING, STARTED TO DO ANYTHING, OR PREPARED TO DO ANYTHING TO END YOUR LIFE?: NO
2. HAVE YOU ACTUALLY HAD ANY THOUGHTS OF KILLING YOURSELF?: NO
1. IN THE PAST MONTH, HAVE YOU WISHED YOU WERE DEAD OR WISHED YOU COULD GO TO SLEEP AND NOT WAKE UP?: NO

## 2025-09-02 ASSESSMENT — ENCOUNTER SYMPTOMS
LOSS OF SENSATION IN FEET: 0
DEPRESSION: 0
OCCASIONAL FEELINGS OF UNSTEADINESS: 0

## 2025-09-02 ASSESSMENT — PAIN SCALES - GENERAL: PAINLEVEL_OUTOF10: 0-NO PAIN

## 2025-09-05 ENCOUNTER — APPOINTMENT (OUTPATIENT)
Dept: INTEGRATIVE MEDICINE | Facility: CLINIC | Age: 40
End: 2025-09-05
Payer: COMMERCIAL

## 2025-09-12 ENCOUNTER — APPOINTMENT (OUTPATIENT)
Dept: INTEGRATIVE MEDICINE | Facility: CLINIC | Age: 40
End: 2025-09-12
Payer: COMMERCIAL

## 2025-09-24 ENCOUNTER — APPOINTMENT (OUTPATIENT)
Dept: PHARMACY | Facility: HOSPITAL | Age: 40
End: 2025-09-24
Payer: COMMERCIAL

## 2025-10-08 ENCOUNTER — APPOINTMENT (OUTPATIENT)
Dept: PRIMARY CARE | Facility: CLINIC | Age: 40
End: 2025-10-08
Payer: COMMERCIAL

## (undated) DEVICE — BANDAGE, GAUZE, 6 PLY, KERLIX, 2.25 IN X 3 YD, STERILE

## (undated) DEVICE — KIT, MARGINMARKER, 6 INK COLORS, STANDARD

## (undated) DEVICE — COVER, MAYO STAND, W/PAD, 23 IN, DISPOSABLE, PLASTIC, LF, STERILE

## (undated) DEVICE — TOWEL PACK, STERILE, 4/PACK, BLUE

## (undated) DEVICE — RETRACTOR, HANDHELD, 250ML, DBL ENDED

## (undated) DEVICE — SUTURE, VICRYL, 2-0, 27 IN, SH, UNDYED

## (undated) DEVICE — APPLICATOR, CHLORAPREP, W/ORANGE TINT, 26ML

## (undated) DEVICE — ADHESIVE, SKIN, MASTISOL, 2/3 CC VIAL

## (undated) DEVICE — MARKER, SKIN, DUAL TIP, W/RULER AND LABEL

## (undated) DEVICE — Device

## (undated) DEVICE — GLOVE, SURGICAL, PROTEXIS PI , 5.5, PF, LF

## (undated) DEVICE — STRIP, SKIN CLOSURE, STERI STRIP, REINFORCED, 0.5 X 4 IN

## (undated) DEVICE — PROBE COVER, INTRAOPERATIVE, 13 X 244CM (5 X 96IN)

## (undated) DEVICE — SOLUTION, IRRIGATION, STERILE WATER, 1000 ML, POUR BOTTLE

## (undated) DEVICE — SUTURE, VICRYL, 3-0, 27 IN, SH

## (undated) DEVICE — SOLUTION, IRRIGATION, SODIUM CHLORIDE 0.9%, 1000 ML, POUR BOTTLE

## (undated) DEVICE — DRESSING, TRANSPARENT, TEGADERM, 4 X 4-3/4 IN